# Patient Record
Sex: MALE | Race: WHITE | NOT HISPANIC OR LATINO | Employment: UNEMPLOYED | URBAN - METROPOLITAN AREA
[De-identification: names, ages, dates, MRNs, and addresses within clinical notes are randomized per-mention and may not be internally consistent; named-entity substitution may affect disease eponyms.]

---

## 2019-01-02 ENCOUNTER — TELEPHONE (OUTPATIENT)
Dept: ORTHOPEDIC SURGERY | Facility: CLINIC | Age: 49
End: 2019-01-02

## 2019-01-02 NOTE — TELEPHONE ENCOUNTER
TRIED CALLING, SAID THE NUMBER HAD BEEN CHANGED OR IS NO LONGER IN USE. THERE ISNT ANOTHER NUMBER IN CHART TO CALL.     JUST NEEDS TO BE AWARE OF NEW APPOINTMENT TIME ON 1.17.19

## 2019-01-17 ENCOUNTER — OFFICE VISIT (OUTPATIENT)
Dept: ORTHOPEDIC SURGERY | Facility: CLINIC | Age: 49
End: 2019-01-17

## 2019-01-17 VITALS
BODY MASS INDEX: 31.1 KG/M2 | SYSTOLIC BLOOD PRESSURE: 124 MMHG | WEIGHT: 210 LBS | HEIGHT: 69 IN | HEART RATE: 66 BPM | DIASTOLIC BLOOD PRESSURE: 80 MMHG

## 2019-01-17 DIAGNOSIS — R52 PAIN: Primary | ICD-10-CM

## 2019-01-17 DIAGNOSIS — M17.0 PRIMARY OSTEOARTHRITIS OF BOTH KNEES: ICD-10-CM

## 2019-01-17 PROCEDURE — 73562 X-RAY EXAM OF KNEE 3: CPT | Performed by: ORTHOPAEDIC SURGERY

## 2019-01-17 PROCEDURE — 99203 OFFICE O/P NEW LOW 30 MIN: CPT | Performed by: ORTHOPAEDIC SURGERY

## 2019-01-17 RX ORDER — AMOXICILLIN AND CLAVULANATE POTASSIUM 875; 125 MG/1; MG/1
TABLET, FILM COATED ORAL
COMMUNITY
Start: 2019-01-11 | End: 2019-04-03

## 2019-01-17 RX ORDER — LEVOTHYROXINE SODIUM 0.03 MG/1
25 TABLET ORAL DAILY
COMMUNITY
Start: 2019-01-14

## 2019-01-17 RX ORDER — DEXTROMETHORPHAN HYDROBROMIDE AND PROMETHAZINE HYDROCHLORIDE 15; 6.25 MG/5ML; MG/5ML
SYRUP ORAL
COMMUNITY
Start: 2019-01-11 | End: 2019-04-09

## 2019-01-17 RX ORDER — CLOPIDOGREL BISULFATE 75 MG/1
75 TABLET ORAL DAILY
COMMUNITY
Start: 2019-01-14 | End: 2019-08-01

## 2019-01-17 RX ORDER — PREDNISONE 10 MG/1
TABLET ORAL
COMMUNITY
Start: 2019-01-11 | End: 2019-04-03

## 2019-01-17 RX ORDER — LIDOCAINE AND PRILOCAINE 25; 25 MG/G; MG/G
1 CREAM TOPICAL 3 TIMES DAILY PRN
COMMUNITY
Start: 2018-11-19 | End: 2019-09-17

## 2019-01-17 RX ORDER — ALBUTEROL SULFATE 2.5 MG/3ML
2.5 SOLUTION RESPIRATORY (INHALATION) EVERY 6 HOURS PRN
COMMUNITY
Start: 2019-01-11

## 2019-01-17 RX ORDER — GABAPENTIN 600 MG/1
TABLET ORAL
COMMUNITY
Start: 2018-12-18 | End: 2019-04-03

## 2019-01-17 RX ORDER — BUPRENORPHINE HYDROCHLORIDE AND NALOXONE HYDROCHLORIDE DIHYDRATE 8; 2 MG/1; MG/1
0.5 TABLET SUBLINGUAL 4 TIMES DAILY
COMMUNITY
Start: 2019-01-12

## 2019-01-17 NOTE — PROGRESS NOTES
Subjective:Bilateral knee pain, right worse than left.         Patient ID: Gasper Greene is a 48 y.o. male.    Chief Complaint:    History of Present Illness Patient known to me although has not been seen for over 3 years, returns with bilateral knee pain. He had ACL reconstruction in his left knee over 20-25 years ago and has developed osteoarthritis and pain in both knees, although the right knee, which had MCL reconstruction again over 25 years ago by another physician, is most symptomatic. He has failed to respond to anti-inflammatory agents which he can no longer take as he is now on Plavix. He has also failed cortisone injections and viscosupplement injections and presents for evaluation for possible knee replacement. His current medical history is significant that he is recently under care for chronic wound to the right foot for a prominent 2nd metatarsal head and may need surgery to correct that problem.            Social History     Occupational History   • Not on file   Tobacco Use   • Smoking status: Not on file   Substance and Sexual Activity   • Alcohol use: Not on file   • Drug use: Not on file   • Sexual activity: Not on file      Review of Systems   Constitutional: Negative for chills, diaphoresis, fever and unexpected weight change.   HENT: Negative for hearing loss, nosebleeds, sore throat and tinnitus.    Eyes: Negative for pain and visual disturbance.   Respiratory: Negative for cough, shortness of breath and wheezing.    Cardiovascular: Negative for chest pain and palpitations.   Gastrointestinal: Negative for abdominal pain, diarrhea, nausea and vomiting.   Endocrine: Negative for cold intolerance, heat intolerance and polydipsia.   Genitourinary: Negative for difficulty urinating, dysuria and hematuria.   Musculoskeletal: Positive for arthralgias and myalgias. Negative for joint swelling.   Skin: Negative for rash and wound.   Allergic/Immunologic: Negative for environmental allergies.    Neurological: Negative for dizziness, syncope and numbness.   Hematological: Does not bruise/bleed easily.   Psychiatric/Behavioral: Negative for dysphoric mood and sleep disturbance. The patient is not nervous/anxious.          History reviewed. No pertinent past medical history.  Past Surgical History:   Procedure Laterality Date   • KNEE SURGERY      ACL LEFT   • STOMACH SURGERY       No family history on file.      Objective:  Vitals:    01/17/19 1333   BP: 124/80   Pulse: 66         01/17/19  1333   Weight: 95.3 kg (210 lb)     Body mass index is 31.01 kg/m².        Ortho Exam   AP, lateral and sunrise views of both knees show extensive end-stage tricompartmental degenerative arthritis in both knees, particularly in the left knee which shows significant posterior osteophytes. He is bone-to-bone medially in the right knee also. He is alert and oriented x3. Head is normocephalic and sclerae are clear. The left knee has a moderate varus deformity. He has full extension and flexion to about 95° with pain and crepitus. AP instability on testing. Varus instability on stressing. No motor deficit but does have neuropathy in both feet. The right knee shows mild boggy effusion and 0° to 125° of motion with moderate crepitus and pain but no patellar subluxation. Quad function is 5 over 5. No instability at 0° to 90° and no varus or valgus instability. His calf is nontender with good distal pulses. No motor or sensory deficit. Again, he is unable to take anti-inflammatories because he is now currently on Plavix and recently had stents and has a history of a bleeding ulcer. The pain he describes is 8 or 9 out of 10, quite disabling, particularly in the right knee at this time.         Assessment:        1. Pain    2. Primary osteoarthritis of both knees           Plan:I spent over 20 minutes with the patient face-to-face discussing total joint replacement. He does smoke, and over 3 minutes was spent with the patient  reviewing the need to stop smoking before surgery as I discussed how smoking adversely affects the outcome, increasing the risk of infection. I reviewed other risks of surgery, and I gave him a booklet to review that include but not limited to infection and the need for multiple procedures to include implant removal to eradicate the infection, nerve injury and foot drop, vascular injury, periprosthetic fracture, the need for revision surgery, persistent pain and discomfort despite a well-implanted total knee in 10% to 15% of the people. Discussed rehab which is 3 months to a year. Again discussed in detail cessation of smoking. He needs to have his foot problem corrected as he states he will probably need some surgery to correct the wound and he will call back after he has been released by the wound care center for the foot problem to schedule surgery. He understands this and will call back when he is ready.                Work Status:    ORTIZ query complete.    Orders:  Orders Placed This Encounter   Procedures   • XR knee 3 vw bilateral       Medications:  No orders of the defined types were placed in this encounter.      Followup:  Return if symptoms worsen or fail to improve.          Dictated utilizing Dragon dictation

## 2019-04-03 ENCOUNTER — OFFICE VISIT (OUTPATIENT)
Dept: ORTHOPEDIC SURGERY | Facility: CLINIC | Age: 49
End: 2019-04-03

## 2019-04-03 DIAGNOSIS — M17.0 PRIMARY OSTEOARTHRITIS OF BOTH KNEES: Primary | ICD-10-CM

## 2019-04-03 PROCEDURE — 99213 OFFICE O/P EST LOW 20 MIN: CPT | Performed by: ORTHOPAEDIC SURGERY

## 2019-04-03 RX ORDER — GABAPENTIN 800 MG/1
800 TABLET ORAL 3 TIMES DAILY
COMMUNITY

## 2019-04-03 NOTE — PROGRESS NOTES
Subjective: Osteoarthritis right knee     Patient ID: Gasper Greene is a 48 y.o. male.    Chief Complaint:    History of Present Illness 48-year-old male returns to schedule right total knee arthroplasty to the right knee.  Again he failed to respond to nonoperative management of anti-inflammatory medications cortisone injections Visco supplement injections and modification of activity.  I previously discussed with him the risk and benefits of surgery and he understands wants to proceed with the right knee first       Social History     Occupational History   • Not on file   Tobacco Use   • Smoking status: Not on file   Substance and Sexual Activity   • Alcohol use: Not on file   • Drug use: Not on file   • Sexual activity: Not on file      Review of Systems   Constitutional: Negative for chills, diaphoresis, fever and unexpected weight change.   HENT: Negative for hearing loss, nosebleeds, sore throat and tinnitus.    Eyes: Negative for pain and visual disturbance.   Respiratory: Negative for cough, shortness of breath and wheezing.    Cardiovascular: Negative for chest pain and palpitations.   Gastrointestinal: Negative for abdominal pain, diarrhea, nausea and vomiting.   Endocrine: Negative for cold intolerance, heat intolerance and polydipsia.   Genitourinary: Negative for difficulty urinating, dysuria and hematuria.   Musculoskeletal: Positive for arthralgias.   Skin: Negative for rash and wound.   Allergic/Immunologic: Negative for environmental allergies.   Neurological: Negative for dizziness, syncope and numbness.   Hematological: Does not bruise/bleed easily.   Psychiatric/Behavioral: Negative for dysphoric mood and sleep disturbance. The patient is not nervous/anxious.    All other systems reviewed and are negative.        No past medical history on file.  Past Surgical History:   Procedure Laterality Date   • KNEE SURGERY      ACL LEFT   • STOMACH SURGERY       No family history on  file.      Objective:  There were no vitals filed for this visit.  There were no vitals filed for this visit.  There is no height or weight on file to calculate BMI.        Ortho Exam   Is alert and oriented x3.  The right knee has 0-125 degrees of motion with moderate to severe crepitus but no patella subluxation.  No instability 0 90 degrees no varus or valgus instability at 0 30 degrees.  Quad function is 5/5 the calf is nontender.  Good distal pulses no motor or sensory deficit.  Skin is cool to touch.  There is no erythema.  Joint line tenderness but negative Korina.  Tolerating anti-inflammatories with no improvement in his symptoms.    Assessment:        1. Primary osteoarthritis of both knees           Plan: Over 10 minutes was spent with the patient once again discussing total joint replacement risk and benefits rehab and recovery.  He understands and wishes to proceed ASAP.  Tendon was set up for April 30 pending preop evaluation by his primary care physician and his cardiologist            Work Status:    ORTIZ query complete.    Orders:  No orders of the defined types were placed in this encounter.      Medications:  No orders of the defined types were placed in this encounter.      Followup:  Return in about 4 weeks (around 5/1/2019).          Dictated utilizing Dragon dictation

## 2019-04-04 ENCOUNTER — PREP FOR SURGERY (OUTPATIENT)
Dept: OTHER | Facility: HOSPITAL | Age: 49
End: 2019-04-04

## 2019-04-04 DIAGNOSIS — M17.11 PRIMARY OSTEOARTHRITIS OF RIGHT KNEE: Primary | ICD-10-CM

## 2019-04-04 RX ORDER — PREGABALIN 75 MG/1
150 CAPSULE ORAL ONCE
Status: CANCELLED | OUTPATIENT
Start: 2019-04-30

## 2019-04-04 RX ORDER — ACETAMINOPHEN 500 MG
1000 TABLET ORAL ONCE
Status: CANCELLED | OUTPATIENT
Start: 2019-04-30

## 2019-04-04 RX ORDER — MELOXICAM 7.5 MG/1
15 TABLET ORAL ONCE
Status: CANCELLED | OUTPATIENT
Start: 2019-04-30

## 2019-04-04 RX ORDER — CEFAZOLIN SODIUM 2 G/50ML
2 SOLUTION INTRAVENOUS ONCE
Status: CANCELLED | OUTPATIENT
Start: 2019-04-30

## 2019-04-09 ENCOUNTER — HOSPITAL ENCOUNTER (OUTPATIENT)
Dept: GENERAL RADIOLOGY | Facility: HOSPITAL | Age: 49
Discharge: HOME | End: 2019-04-09
Admitting: ORTHOPAEDIC SURGERY

## 2019-04-09 ENCOUNTER — APPOINTMENT (OUTPATIENT)
Dept: PREADMISSION TESTING | Facility: HOSPITAL | Age: 49
End: 2019-04-09

## 2019-04-09 VITALS
BODY MASS INDEX: 30.85 KG/M2 | SYSTOLIC BLOOD PRESSURE: 96 MMHG | OXYGEN SATURATION: 98 % | RESPIRATION RATE: 16 BRPM | DIASTOLIC BLOOD PRESSURE: 56 MMHG | HEART RATE: 57 BPM | WEIGHT: 215.5 LBS | HEIGHT: 70 IN

## 2019-04-09 DIAGNOSIS — M17.11 PRIMARY OSTEOARTHRITIS OF RIGHT KNEE: ICD-10-CM

## 2019-04-09 LAB
ANION GAP SERPL CALCULATED.3IONS-SCNC: 5.8 MMOL/L
BASOPHILS # BLD AUTO: 0.04 10*3/MM3 (ref 0–0.2)
BASOPHILS NFR BLD AUTO: 0.7 % (ref 0–1.5)
BILIRUB UR QL STRIP: NEGATIVE
BUN BLD-MCNC: 16 MG/DL (ref 6–20)
BUN/CREAT SERPL: 20.3 (ref 7–25)
CALCIUM SPEC-SCNC: 9.2 MG/DL (ref 8.6–10.5)
CHLORIDE SERPL-SCNC: 102 MMOL/L (ref 98–107)
CLARITY UR: CLEAR
CO2 SERPL-SCNC: 32.2 MMOL/L (ref 22–29)
COLOR UR: ABNORMAL
CREAT BLD-MCNC: 0.79 MG/DL (ref 0.76–1.27)
DEPRECATED RDW RBC AUTO: 50.3 FL (ref 37–54)
EOSINOPHIL # BLD AUTO: 0.39 10*3/MM3 (ref 0–0.4)
EOSINOPHIL NFR BLD AUTO: 7.2 % (ref 0.3–6.2)
ERYTHROCYTE [DISTWIDTH] IN BLOOD BY AUTOMATED COUNT: 13.3 % (ref 12.3–15.4)
GFR SERPL CREATININE-BSD FRML MDRD: 104 ML/MIN/1.73
GLUCOSE BLD-MCNC: 96 MG/DL (ref 65–99)
GLUCOSE UR STRIP-MCNC: NEGATIVE MG/DL
HCT VFR BLD AUTO: 40.6 % (ref 37.5–51)
HGB BLD-MCNC: 13.3 G/DL (ref 13–17.7)
HGB UR QL STRIP.AUTO: NEGATIVE
IMM GRANULOCYTES # BLD AUTO: 0.02 10*3/MM3 (ref 0–0.05)
IMM GRANULOCYTES NFR BLD AUTO: 0.4 % (ref 0–0.5)
KETONES UR QL STRIP: ABNORMAL
LEUKOCYTE ESTERASE UR QL STRIP.AUTO: NEGATIVE
LYMPHOCYTES # BLD AUTO: 1.77 10*3/MM3 (ref 0.7–3.1)
LYMPHOCYTES NFR BLD AUTO: 32.8 % (ref 19.6–45.3)
MCH RBC QN AUTO: 33.4 PG (ref 26.6–33)
MCHC RBC AUTO-ENTMCNC: 32.8 G/DL (ref 31.5–35.7)
MCV RBC AUTO: 102 FL (ref 79–97)
MONOCYTES # BLD AUTO: 0.49 10*3/MM3 (ref 0.1–0.9)
MONOCYTES NFR BLD AUTO: 9.1 % (ref 5–12)
NEUTROPHILS # BLD AUTO: 2.69 10*3/MM3 (ref 1.4–7)
NEUTROPHILS NFR BLD AUTO: 49.8 % (ref 42.7–76)
NITRITE UR QL STRIP: NEGATIVE
NRBC BLD AUTO-RTO: 0 /100 WBC (ref 0–0)
PH UR STRIP.AUTO: 6 [PH] (ref 4.5–8)
PLATELET # BLD AUTO: 192 10*3/MM3 (ref 140–450)
PMV BLD AUTO: 9.6 FL (ref 6–12)
POTASSIUM BLD-SCNC: 4.7 MMOL/L (ref 3.5–5.2)
PROT UR QL STRIP: NEGATIVE
RBC # BLD AUTO: 3.98 10*6/MM3 (ref 4.14–5.8)
SODIUM BLD-SCNC: 140 MMOL/L (ref 136–145)
SP GR UR STRIP: 1.02 (ref 1–1.03)
UROBILINOGEN UR QL STRIP: ABNORMAL
WBC NRBC COR # BLD: 5.4 10*3/MM3 (ref 3.4–10.8)

## 2019-04-09 PROCEDURE — 85025 COMPLETE CBC W/AUTO DIFF WBC: CPT | Performed by: ORTHOPAEDIC SURGERY

## 2019-04-09 PROCEDURE — 81003 URINALYSIS AUTO W/O SCOPE: CPT | Performed by: ORTHOPAEDIC SURGERY

## 2019-04-09 PROCEDURE — 80048 BASIC METABOLIC PNL TOTAL CA: CPT | Performed by: ORTHOPAEDIC SURGERY

## 2019-04-09 PROCEDURE — 36415 COLL VENOUS BLD VENIPUNCTURE: CPT

## 2019-04-09 PROCEDURE — 87081 CULTURE SCREEN ONLY: CPT | Performed by: ORTHOPAEDIC SURGERY

## 2019-04-09 PROCEDURE — 71046 X-RAY EXAM CHEST 2 VIEWS: CPT

## 2019-04-09 RX ORDER — MULTIPLE VITAMINS W/ MINERALS TAB 9MG-400MCG
1 TAB ORAL DAILY
COMMUNITY

## 2019-04-09 RX ORDER — BUSPIRONE HYDROCHLORIDE 10 MG/1
10 TABLET ORAL 3 TIMES DAILY PRN
COMMUNITY

## 2019-04-09 RX ORDER — PYRIDOXINE HCL (VITAMIN B6) 50 MG
1000 TABLET ORAL DAILY
COMMUNITY
End: 2020-01-06

## 2019-04-09 RX ORDER — DULOXETIN HYDROCHLORIDE 60 MG/1
60 CAPSULE, DELAYED RELEASE ORAL DAILY
COMMUNITY
End: 2019-08-01

## 2019-04-09 RX ORDER — ASPIRIN 81 MG/1
81 TABLET ORAL DAILY
COMMUNITY
End: 2019-05-02 | Stop reason: HOSPADM

## 2019-04-09 NOTE — DISCHARGE INSTRUCTIONS
PRE-ADMISSION TESTING INSTRUCTIONS FOR TOTAL JOINT PATIENTS    Take these medications the morning of surgery with a small sip of water:  Suboxone, duloxetine, levothyroxine, and gabapentin, use your nebulizer      No aspirin, advil, aleve, ibuprofen, naproxen, diet pills, decongestants, or vitamin/herbal supplements for a week prior to your surgery.  Dr Curry's office will let you know about stopping your Plavix for surgery.    Do not take any insulin or diabetes medications the morning of surgery.      Start your Bactroban ointment on ____4/25/2019_______.  You will need to apply the ointment with a clean Q-tip 3 times a day in both sides of your nose for 5 days and the morning of surgery.    General Instructions:    • Do not eat solid food after midnight the night before surgery.  No gum, mints, or hard candy after midnight the night before surgery.  • You may drink clear liquids the day of surgery up until 2 hours before your arrival time.  • Clear liquids are liquids you can see through. Nothing RED in color.    Plain water    Sports drinks  Sodas     Gelatin (Jell-O)  Fruit juices without pulp such as white grape juice and apple juice  Popsicles that contain no fruit or yogurt  Tea or coffee (no cream or milk added)    • It is beneficial for you to have a clear drink that contains carbohydrates just before you leave your house and before your fasting time begins.  We suggest a 20 ounce bottle of Gatorade or Powerade for non-diabetic patients or a 20 ounce bottle of G2 or Powerade Zero for diabetic patients.     • Patients who avoid smoking, chewing tobacco and alcohol for 4 weeks prior to surgery have a reduced risk of post-operative complications.  If at all possible, quit smoking as many days before surgery as you can.    • Do not smoke, use chewing tobacco or drink alcohol after midnight the day of surgery.    • Bring your C-PAP/ BI-PAP machine if you use one.  • Wear clean comfortable clothes and  socks.  • Do not wear contact lenses, lotion, deodorant, or make-up.  Bring a case for your glasses if applicable.   • You may brush your teeth the morning of surgery.  • You may wear dentures/partials, do not put adhesive/glue on them.  • Bring crutches or walker if applicable.  • Leave all other jewelry and valuables at home.  • NOTIFY YOUR SURGEON IF YOU BECOME ILL, HAVE A FEVER, PRODUCTIVE COUGH, OR CANNOT BE HERE THE DAY OF SURGERY.      Preventing a Surgical Site Infection:    • Shower the night before and on the morning of surgery using the chlorhexidine soap you were given.  Use a clean washcloth with the soap.  Place clean sheets on your bed after showering the night before surgery. Do not use the CHG soap on your hair, face, or private areas. Wash your body gently for five (5) minutes. Do not scrub your skin.  Dry with a clean towel and dress in clean clothing.    • Do not shave the surgical area for 10 days-2 weeks prior to surgery  because the razor can irritate skin and make it easier to develop an infection.  • Make sure you, your family, and all healthcare providers clean their hands with soap and water or an alcohol based hand  before caring for you or your wound.      Day of surgery:    Your surgeon’s office will advise you of your arrival time for the day of surgery.    Upon arrival, a Pre-op nurse and Anesthesia provider will review your health history, obtain vital signs, and answer questions you may have.  The only belongings needed at this time will be your home medications and if applicable your C-PAP/BI-PAP machine.  If you are staying overnight your family can leave the rest of your belongings in the car and bring them to your room later.  A Pre-op nurse will start an IV and you may receive medication in preparation for surgery, including something to help you relax.  Your family will be able to see you in the Pre-op area.  While you are in surgery your family should notify the  waiting room  if they leave the waiting room area and provide a contact phone number.    If you have any questions, you can call the Pre-Admission Department at [phone] or your surgeon's office.    Please be aware that surgery does come with discomfort.  We want to make every effort to control your discomfort so please discuss any uncontrolled symptoms with your nurse.   Your doctor will most likely have prescribed pain medications.     You may have bruising or discomfort from the tourniquet used in surgery.     Please leave all luggage in the car the morning of surgery.  You will be transported to your hospital room following the recovery period.  Your family can get your luggage at that time.      You may receive a survey regarding the care you received. Your feedback is very important and will be used to collect the necessary data to help us to continue to provide excellent care.

## 2019-04-11 LAB — MRSA SPEC QL CULT: NORMAL

## 2019-04-17 DIAGNOSIS — M17.11 PRIMARY OSTEOARTHRITIS OF RIGHT KNEE: ICD-10-CM

## 2019-04-18 ENCOUNTER — OFFICE VISIT (OUTPATIENT)
Dept: SLEEP MEDICINE | Facility: HOSPITAL | Age: 49
End: 2019-04-18

## 2019-04-18 VITALS
SYSTOLIC BLOOD PRESSURE: 111 MMHG | DIASTOLIC BLOOD PRESSURE: 74 MMHG | BODY MASS INDEX: 30.92 KG/M2 | HEART RATE: 58 BPM | WEIGHT: 216 LBS | HEIGHT: 70 IN

## 2019-04-18 DIAGNOSIS — G47.8 NON-RESTORATIVE SLEEP: ICD-10-CM

## 2019-04-18 DIAGNOSIS — R25.8 NOCTURNAL LEG MOVEMENTS: ICD-10-CM

## 2019-04-18 DIAGNOSIS — E66.9 OBESITY (BMI 30-39.9): ICD-10-CM

## 2019-04-18 DIAGNOSIS — G47.30 SLEEP APNEA, UNSPECIFIED TYPE: Primary | ICD-10-CM

## 2019-04-18 DIAGNOSIS — G47.10 HYPERSOMNIA: ICD-10-CM

## 2019-04-18 PROCEDURE — 99213 OFFICE O/P EST LOW 20 MIN: CPT | Performed by: FAMILY MEDICINE

## 2019-04-18 PROCEDURE — G0463 HOSPITAL OUTPT CLINIC VISIT: HCPCS

## 2019-04-18 NOTE — PROGRESS NOTES
[facility] New Patient/Consultation       Reason for Consultation: History of obstructive sleep apnea on CPAP in the past; oxygen desaturations noted while under anesthesia      Patient Care Team:  Nila Anne APRN as PCP - General (Family Medicine)  Chace Bradshaw MD as Consulting Physician (Sleep Medicine)      History of present illness:  Thank you for asking me to see your patient.  The patient is a 48 y.o. male today with concern for obstructive sleep apnea.  Notes that he is been told that he holds his breath while he sleeps.  Also has been told he stops breathing during sleep.  He had a sleep study when he lived in [place].  He had a BIPAP in the past however is not currently using it for several years now. Has been on suboxone for about 6 months.     Sleep Schedule:  Bed time: Varies  Sleep latency: Varies  Wake time: Wakes up about every 2 hours when he sleeping  Average hours slept: 6  Non-restorative sleep: Yes  Number of naps per day: 1  Rotating shifts?:no  Nocturia: yes and 3 times  Electronics in bedroom: no    Excessive daytime sleepiness or drowsiness: yes  Any accidents at work due to sleepiness in the last 5 years:no  Any difficulty driving due to sleepiness or being drowsy: no  Weight changed in the last 5 years:yes and Lost 30 pounds after he had a heart attack however then gained back    Snoring:yes  Witnessed apneas:yes  Have you ever awakened gasping for breath, coughing, choking or respiratory discomfort: yes  Morning headaches: no  Awaken with a sore throat or dry mouth: no    Any reports of leg jerking at night: yes  Urge sensations: yes  Does pain disrupt sleep: yes  Sweating during sleep:no  Teeth grinding: no    Any sudden episodes of sleep during the day: no  Sleep paralysis/hallucinations: no  Muscle weakness with laughing/anger: no  Nightmares: no  Sleep walking: no    Are you sleepy when you increase your sleep time: no  Do you sleep better away from your own bed:  no    ESS: 4    Social History: Smokes 1 pack/day since age 13; no alcohol use; 3 sodas a day     Review of Systems negative except for: frequent urination; nasal congestion; joint pain; regular heartbeat; shortness of breath; dizziness; anxiety; depression; see page for sleep questionnaire for further details    Allergies:  Nsaids and Celebrex [celecoxib]    Family History: SOFY no       Current Outpatient Medications:   •  albuterol (PROVENTIL) (2.5 MG/3ML) 0.083% nebulizer solution, Take  by nebulization Every 6 (Six) Hours As Needed for Wheezing or Shortness of Air., Disp: , Rfl:   •  aspirin 81 MG EC tablet, Take 81 mg by mouth Daily., Disp: , Rfl:   •  buprenorphine-naloxone (SUBOXONE) 8-2 MG per SL tablet, Place 0.5 tablets under the tongue 2 (Two) Times a Day. Takes half in morning and half in afternoon, Disp: , Rfl:   •  busPIRone (BUSPAR) 10 MG tablet, Take 10 mg by mouth 3 (Three) Times a Day As Needed (anxiety)., Disp: , Rfl:   •  Cholecalciferol (VITAMIN D PO), Take 1 tablet by mouth Daily., Disp: , Rfl:   •  clopidogrel (PLAVIX) 75 MG tablet, Take 75 mg by mouth Daily., Disp: , Rfl:   •  Coenzyme Q10 (COQ10 PO), Take 1 tablet by mouth Daily., Disp: , Rfl:   •  cyanocobalamin (CYANOCOBALAMIN) 100 MCG tablet tablet, Take 1,000 mcg by mouth Daily., Disp: , Rfl:   •  DULoxetine (CYMBALTA) 60 MG capsule, Take 60 mg by mouth Daily., Disp: , Rfl:   •  gabapentin (NEURONTIN) 800 MG tablet, Take 800 mg by mouth 3 (Three) Times a Day. One tablet twice a day and 2 tablets at bedtime, Disp: , Rfl:   •  levothyroxine (SYNTHROID, LEVOTHROID) 25 MCG tablet, Take 25 mcg by mouth Daily., Disp: , Rfl:   •  lidocaine-prilocaine (EMLA) 2.5-2.5 % cream, Apply 1 application topically to the appropriate area as directed 3 (Three) Times a Day As Needed (neuropathy)., Disp: , Rfl:   •  Multiple Vitamins-Minerals (MULTIVITAMIN WITH MINERALS) tablet tablet, Take 1 tablet by mouth Daily., Disp: , Rfl:   •  mupirocin (BACTROBAN) 2  "% ointment, 1 application into the nostril(s) as directed by provider 3 (Three) Times a Day. 5 days prior to surgery., Disp: 22 g, Rfl: 0    Vital Signs:    Vitals:    04/18/19 0900   BP: 111/74   Pulse: 58   Weight: 98 kg (216 lb)   Height: 176.5 cm (69.5\")      Body mass index is 31.44 kg/m².  Neck Circumference: 18 inches      Physical Exam:   General Alert and oriented. No acute distress noted   Pharynx/Throat Class III Mallampati airway, large tongue, no evidence of redundant lateral pharyngeal tissue. No oral lesions. No thrush. Moist mucous membranes.   Head Normocephalic. Symmetrical. Atraumatic.    Nose No septal deviation. No drainage   Chest Wall Normal shape. Symmetric expansion with respiration. No tenderness.   Neck Trachea midline, no thyromegaly or adenopathy    Lungs Clear to auscultation bilaterally. No wheezes. No rhonchi. No rales. Respirations regular, even and unlabored.   Heart Regular rhythm and normal rate. Normal S1 and S2. No murmur   Abdomen Soft, non-tender and non-distended. Normal bowel sounds. No masses.   Extremities Moves all extremities well. No edema   Psychiatric Normal mood and affect.       Impression:  1. Sleep apnea, unspecified type    2. Hypersomnia    3. Non-restorative sleep    4. Obesity (BMI 30-39.9)    5. Nocturnal leg movements        Plan:    Good sleep hygiene measures should be maintained.  Weight loss would be beneficial in this patient who is obese with BMI 31.5.    I discussed the pathophysiology of obstructive sleep apnea with the patient.  We discussed the adverse outcomes associated with untreated sleep-disordered breathing.  We discussed treatment modalities of obstructive sleep apnea including CPAP device as well as oral mandibular advancement device. Sleep study will be scheduled to establish definitive diagnosis of sleep disorder breathing.  Weight loss will be strongly beneficial in order to reduce the severity of sleep-disordered breathing.  Patient has " narrow oropharyngeal structure.  Caution during activities that require prolonged concentration is strongly advised.  Patient will be notified of sleep study results after sleep study is completed.  If sleep apnea is only mild,  oral mandibular advancement device may be one of the treatment options.  However if sleep apnea is moderately severe, CPAP treatment will be strongly encouraged.  The patient is not opposed to treatment with CPAP device if we confirm significant obstructive sleep apnea on polysomnography.     No Ambien Rx for night of sleep study. Of note has been on Suboxone for about 6 months which has been working well for him.  Because he is on Suboxone we are concerned about possible central sleep apnea secondary to Suboxone use now also being a part of the picture of his history of obstructive sleep apnea.  In lab study would be needed to fully and appropriately diagnose central sleep apnea aspect of his sleep apnea.    Thank you for allowing me to participate in your patient's care.    Chace Bradshaw MD  Sleep Medicine  04/18/19  11:27 AM

## 2019-04-22 ENCOUNTER — HOSPITAL ENCOUNTER (OUTPATIENT)
Dept: SLEEP MEDICINE | Facility: HOSPITAL | Age: 49
Discharge: HOME | End: 2019-04-22
Admitting: FAMILY MEDICINE

## 2019-04-22 DIAGNOSIS — E66.9 OBESITY (BMI 30-39.9): ICD-10-CM

## 2019-04-22 DIAGNOSIS — G47.8 NON-RESTORATIVE SLEEP: ICD-10-CM

## 2019-04-22 DIAGNOSIS — G47.30 SLEEP APNEA, UNSPECIFIED TYPE: ICD-10-CM

## 2019-04-22 DIAGNOSIS — R25.8 NOCTURNAL LEG MOVEMENTS: ICD-10-CM

## 2019-04-22 DIAGNOSIS — G47.10 HYPERSOMNIA: ICD-10-CM

## 2019-04-22 PROCEDURE — 95810 POLYSOM 6/> YRS 4/> PARAM: CPT

## 2019-04-29 ENCOUNTER — ANESTHESIA EVENT (OUTPATIENT)
Dept: PERIOP | Facility: HOSPITAL | Age: 49
End: 2019-04-29

## 2019-04-29 ENCOUNTER — OFFICE VISIT (OUTPATIENT)
Dept: ORTHOPEDIC SURGERY | Facility: CLINIC | Age: 49
End: 2019-04-29

## 2019-04-29 VITALS — WEIGHT: 216 LBS | BODY MASS INDEX: 30.92 KG/M2 | HEIGHT: 70 IN

## 2019-04-29 DIAGNOSIS — M17.11 PRIMARY OSTEOARTHRITIS OF RIGHT KNEE: Primary | ICD-10-CM

## 2019-04-29 PROCEDURE — S0260 H&P FOR SURGERY: HCPCS | Performed by: ORTHOPAEDIC SURGERY

## 2019-04-29 ASSESSMENT — KOOS JR
KOOS JR SCORE: 34.174
KOOS JR SCORE: 21

## 2019-04-29 NOTE — ANESTHESIA PREPROCEDURE EVALUATION
Anesthesia Evaluation     Patient summary reviewed and Nursing notes reviewed   no history of anesthetic complications:  NPO Solid Status: > 8 hours  NPO Liquid Status: > 8 hours           Airway   Mallampati: II  TM distance: >3 FB  Neck ROM: full  No difficulty expected  Dental      Pulmonary     breath sounds clear to auscultation  (+) a smoker (1ppd) Current Smoked day of surgery, sleep apnea (does not use machine ),     ROS comment: XR Chest 2 View ([id number]) (Order [id number])   Order   Status: Final result  Patient Location     Patient Class Location  [facility]      [phone]    Study Notes        Christos, Sarah Haroldo on 4/9/2019 11:25 AM  Pre op for knee surgery 4-3-19  Smoker x 35 years  Heart stents 2017  soa with activity    Appointment Information     PACS Images      Radiology Images  Study Result     CHEST X-RAY, 04/09/2019         HISTORY:  48-year-old male undergoing preoperative testing prior to scheduled knee  surgery. 35 year smoking history. Coronary artery disease with stents.     TECHNIQUE:  PA and lateral upright chest x-ray.     FINDINGS:  Heart size and pulmonary vascularity are normal. The lungs are expanded  and clear. No visible pulmonary infiltrate or pleural effusion.     IMPRESSION:  No active disease.     This report was finalized on 4/9/2019 2:31 PM by Dr. Gabe Mcdaniels MD.      Cardiovascular   Exercise tolerance: good (4-7 METS)    ECG reviewed  PT is on anticoagulation therapy  Rhythm: regular  Rate: normal    (+) hypertension (pt off meds due to side effects ), past MI (pt states he's had 4-5 MIs ) , CAD, cardiac stents (2017 x 2) more than 12 months ago dysrhythmias Bradycardia, angina (pt states related to anxiety ), hyperlipidemia,     ROS comment: Piero Napier MD - 01/24/2019 1:35 PM EST    Ok for surgery with acceptable risks. May stop plavix 5 days before surgery. Follow up 3 months.     Electronically signed by    Neuro/Psych  (+)  dizziness/light headedness, tremors (RLS ), numbness (sai feet neuropathy ), psychiatric history (panic attacks ) Anxiety,     GI/Hepatic/Renal/Endo    (+)  PUD (surgery for bleeding ulcers ),  hepatitis C, hypothyroidism,     Musculoskeletal     (+) back pain,   Abdominal    Substance History   (+) alcohol use (Hx ETOH abuse quit 2000), drug use (hx of meth and pain pill abuse.  quit in 2017   )     OB/GYN          Other   (+) arthritis     ROS/Med Hx Other:  Piero Napier MD at 01/24/2019 1:51 PM EST    Cardiac clearance and PCP clearance reviewed       buprenorphine-naloxone (SUBOXONE) 8-2 MG per SL  Tablet since sept 2018 4/30/2019 at 0900 04/30/19 1013                    Anesthesia Plan    ASA 3     regional, spinal and MAC     intravenous induction   Anesthetic plan, all risks, benefits, and alternatives have been provided, discussed and informed consent has been obtained with: patient.  Use of blood products discussed with patient  Consented to blood products.

## 2019-04-29 NOTE — PROGRESS NOTES
Subjective: Osteoarthritis right knee     Patient ID: Gasper Greene is a 48 y.o. male.    Chief Complaint:    History of Present Illness patient seen for educating her right total knee arthroplasty tomorrow       Social History     Occupational History   • Not on file   Tobacco Use   • Smoking status: Current Every Day Smoker     Packs/day: 1.00     Years: 35.00     Pack years: 35.00     Types: Cigarettes   • Smokeless tobacco: Former User   Substance and Sexual Activity   • Alcohol use: No     Frequency: Never     Comment: h/o stopped in 2000   • Drug use: Yes     Types: Methamphetamines     Comment: history of, last 2/2019   • Sexual activity: Defer      Review of Systems   Constitutional: Negative for chills, diaphoresis, fever and unexpected weight change.   HENT: Negative for hearing loss, nosebleeds, sore throat and tinnitus.    Eyes: Negative for pain and visual disturbance.   Respiratory: Negative for cough, shortness of breath and wheezing.    Cardiovascular: Negative for chest pain and palpitations.   Gastrointestinal: Negative for abdominal pain, diarrhea, nausea and vomiting.   Endocrine: Negative for cold intolerance, heat intolerance and polydipsia.   Genitourinary: Negative for difficulty urinating, dysuria and hematuria.   Musculoskeletal: Positive for arthralgias and myalgias. Negative for joint swelling.   Skin: Negative for rash and wound.   Allergic/Immunologic: Negative for environmental allergies.   Neurological: Negative for dizziness, syncope and numbness.   Hematological: Does not bruise/bleed easily.   Psychiatric/Behavioral: Negative for dysphoric mood and sleep disturbance. The patient is not nervous/anxious.          Past Medical History:   Diagnosis Date   • Arthritis    • Chest pain     states has been told d/t anxiety   • Coronary artery disease 09/2017    s/p stents    • Frequent urination at night    • History of bleeding ulcers 2012   • History of GI bleed 2012   • History of MI  (myocardial infarction) 09/2017    posterior, Dr Napier follows   • History of palpitations     states has pain w/, cardiologist aware   • History of transfusion    • Hyperlipidemia    • Hypothyroidism    • Ischemic cardiomyopathy    • Left knee DJD    • Neuropathy of both feet    • NSVT (nonsustained ventricular tachycardia) (CMS/HCC)     h/o of after MI 2017   • Osteomyelitis of right foot (CMS/HCC)     h/o   • Right knee DJD     sched TKA   • Sleep apnea     no machine     Past Surgical History:   Procedure Laterality Date   • CARDIAC CATHETERIZATION  09/2017   • CORONARY ANGIOPLASTY WITH STENT PLACEMENT  09/2017   • KNEE ACL RECONSTRUCTION Left 1992   • KNEE SURGERY Right     tendon rupture and repair/replace   • METATARSAL OSTEOTOMY Right 02/2019 2nd   • STOMACH SURGERY      d/t bleeding ulcer     Family History   Problem Relation Age of Onset   • Lung cancer Mother    • Alcohol abuse Father    • Seizures Father    • Diabetes Maternal Aunt    • Diabetes Maternal Uncle    • Alcohol abuse Paternal Aunt    • Alcohol abuse Paternal Uncle    • Heart disease Maternal Grandmother    • Alcohol abuse Paternal Grandmother    • Cirrhosis Paternal Grandmother    • Hypertension Paternal Grandfather    • Malig Hyperthermia Neg Hx          Objective:  There were no vitals filed for this visit.      04/29/19  1520   Weight: 98 kg (216 lb)     Body mass index is 31.44 kg/m².        Ortho Exam    H&P completed  Assessment:        1. Primary osteoarthritis of right knee           Plan: All questions answered.  Patient is a drug addict and rehab taking Suboxone and understands and agrees to the fact that we will do only Tylenol postoperatively            Work Status:    ORTIZ query complete.    Orders:  No orders of the defined types were placed in this encounter.      Medications:  No orders of the defined types were placed in this encounter.      Followup:  Return if symptoms worsen or fail to improve.          Dictated  utilizing Dragon dictation

## 2019-04-30 ENCOUNTER — APPOINTMENT (OUTPATIENT)
Dept: GENERAL RADIOLOGY | Facility: HOSPITAL | Age: 49
End: 2019-04-30

## 2019-04-30 ENCOUNTER — HOSPITAL ENCOUNTER (OUTPATIENT)
Facility: HOSPITAL | Age: 49
Setting detail: OBSERVATION
Discharge: HOME | End: 2019-05-02
Attending: ORTHOPAEDIC SURGERY | Admitting: ORTHOPAEDIC SURGERY

## 2019-04-30 ENCOUNTER — ANESTHESIA (OUTPATIENT)
Dept: PERIOP | Facility: HOSPITAL | Age: 49
End: 2019-04-30

## 2019-04-30 DIAGNOSIS — Z96.651 STATUS POST TOTAL RIGHT KNEE REPLACEMENT: Primary | ICD-10-CM

## 2019-04-30 DIAGNOSIS — M17.11 PRIMARY OSTEOARTHRITIS OF RIGHT KNEE: ICD-10-CM

## 2019-04-30 LAB
ABO GROUP BLD: NORMAL
ANTI-K: NORMAL
BLD GP AB SCN SERPL QL: POSITIVE
RH BLD: NEGATIVE
T&S EXPIRATION DATE: NORMAL

## 2019-04-30 PROCEDURE — 27447 TOTAL KNEE ARTHROPLASTY: CPT | Performed by: ORTHOPAEDIC SURGERY

## 2019-04-30 PROCEDURE — 25010000002 VANCOMYCIN 1 G RECONSTITUTED SOLUTION 1 EACH VIAL: Performed by: ORTHOPAEDIC SURGERY

## 2019-04-30 PROCEDURE — 86900 BLOOD TYPING SEROLOGIC ABO: CPT

## 2019-04-30 PROCEDURE — 25010000003 CEFAZOLIN SODIUM-DEXTROSE 2-3 GM-%(50ML) RECONSTITUTED SOLUTION: Performed by: ORTHOPAEDIC SURGERY

## 2019-04-30 PROCEDURE — G0378 HOSPITAL OBSERVATION PER HR: HCPCS

## 2019-04-30 PROCEDURE — 94770: CPT

## 2019-04-30 PROCEDURE — 94799 UNLISTED PULMONARY SVC/PX: CPT

## 2019-04-30 PROCEDURE — 86850 RBC ANTIBODY SCREEN: CPT | Performed by: ORTHOPAEDIC SURGERY

## 2019-04-30 PROCEDURE — 86922 COMPATIBILITY TEST ANTIGLOB: CPT

## 2019-04-30 PROCEDURE — 25010000002 PROPOFOL 10 MG/ML EMULSION: Performed by: NURSE ANESTHETIST, CERTIFIED REGISTERED

## 2019-04-30 PROCEDURE — 86900 BLOOD TYPING SEROLOGIC ABO: CPT | Performed by: ORTHOPAEDIC SURGERY

## 2019-04-30 PROCEDURE — 86901 BLOOD TYPING SEROLOGIC RH(D): CPT

## 2019-04-30 PROCEDURE — C1776 JOINT DEVICE (IMPLANTABLE): HCPCS | Performed by: ORTHOPAEDIC SURGERY

## 2019-04-30 PROCEDURE — 86902 BLOOD TYPE ANTIGEN DONOR EA: CPT

## 2019-04-30 PROCEDURE — 86905 BLOOD TYPING RBC ANTIGENS: CPT

## 2019-04-30 PROCEDURE — 86870 RBC ANTIBODY IDENTIFICATION: CPT | Performed by: ORTHOPAEDIC SURGERY

## 2019-04-30 PROCEDURE — C9290 INJ, BUPIVACAINE LIPOSOME: HCPCS | Performed by: ORTHOPAEDIC SURGERY

## 2019-04-30 PROCEDURE — 86901 BLOOD TYPING SEROLOGIC RH(D): CPT | Performed by: ORTHOPAEDIC SURGERY

## 2019-04-30 PROCEDURE — 25010000002 ROPIVACAINE PER 1 MG: Performed by: NURSE ANESTHETIST, CERTIFIED REGISTERED

## 2019-04-30 PROCEDURE — 25010000003 BUPIVACAINE LIPOSOME 1.3 % SUSPENSION 20 ML VIAL: Performed by: ORTHOPAEDIC SURGERY

## 2019-04-30 PROCEDURE — C1713 ANCHOR/SCREW BN/BN,TIS/BN: HCPCS | Performed by: ORTHOPAEDIC SURGERY

## 2019-04-30 PROCEDURE — 73560 X-RAY EXAM OF KNEE 1 OR 2: CPT

## 2019-04-30 PROCEDURE — 20985 CPTR-ASST DIR MS PX: CPT | Performed by: ORTHOPAEDIC SURGERY

## 2019-04-30 PROCEDURE — 25010000002 DEXAMETHASONE PER 1 MG: Performed by: NURSE ANESTHETIST, CERTIFIED REGISTERED

## 2019-04-30 PROCEDURE — 86920 COMPATIBILITY TEST SPIN: CPT

## 2019-04-30 PROCEDURE — 99213 OFFICE O/P EST LOW 20 MIN: CPT | Performed by: HOSPITALIST

## 2019-04-30 PROCEDURE — 25010000002 ONDANSETRON PER 1 MG: Performed by: NURSE ANESTHETIST, CERTIFIED REGISTERED

## 2019-04-30 PROCEDURE — 25010000002 VANCOMYCIN PER 500 MG: Performed by: ORTHOPAEDIC SURGERY

## 2019-04-30 DEVICE — COMP FEM TRIATH CR CMTLS SZ5 RT: Type: IMPLANTABLE DEVICE | Site: KNEE | Status: FUNCTIONAL

## 2019-04-30 DEVICE — BASEPLT TIB TRIATH TRITANIUM SZ5: Type: IMPLANTABLE DEVICE | Site: KNEE | Status: FUNCTIONAL

## 2019-04-30 DEVICE — CAP TOTAL KN CMTLS 4 PC: Type: IMPLANTABLE DEVICE | Site: KNEE | Status: FUNCTIONAL

## 2019-04-30 DEVICE — INSRT TIB/KN TRIATHLON CONDY/STBL X3 SZ5 9MM: Type: IMPLANTABLE DEVICE | Site: KNEE | Status: FUNCTIONAL

## 2019-04-30 RX ORDER — CEFAZOLIN SODIUM 2 G/50ML
2 SOLUTION INTRAVENOUS EVERY 8 HOURS
Status: COMPLETED | OUTPATIENT
Start: 2019-04-30 | End: 2019-05-01

## 2019-04-30 RX ORDER — DEXAMETHASONE SODIUM PHOSPHATE 4 MG/ML
8 INJECTION, SOLUTION INTRA-ARTICULAR; INTRALESIONAL; INTRAMUSCULAR; INTRAVENOUS; SOFT TISSUE ONCE
Status: COMPLETED | OUTPATIENT
Start: 2019-04-30 | End: 2019-04-30

## 2019-04-30 RX ORDER — ONDANSETRON 4 MG/1
4 TABLET, FILM COATED ORAL EVERY 6 HOURS PRN
Status: DISCONTINUED | OUTPATIENT
Start: 2019-04-30 | End: 2019-05-02 | Stop reason: HOSPADM

## 2019-04-30 RX ORDER — BUSPIRONE HYDROCHLORIDE 5 MG/1
10 TABLET ORAL 3 TIMES DAILY PRN
Status: DISCONTINUED | OUTPATIENT
Start: 2019-04-30 | End: 2019-05-02 | Stop reason: HOSPADM

## 2019-04-30 RX ORDER — BUPRENORPHINE 2 MG/1
4 TABLET SUBLINGUAL ONCE
Status: COMPLETED | OUTPATIENT
Start: 2019-04-30 | End: 2019-04-30

## 2019-04-30 RX ORDER — ACETAMINOPHEN 500 MG
1000 TABLET ORAL EVERY 6 HOURS
Status: DISCONTINUED | OUTPATIENT
Start: 2019-04-30 | End: 2019-05-02 | Stop reason: HOSPADM

## 2019-04-30 RX ORDER — BACITRACIN ZINC 500 [USP'U]/G
OINTMENT TOPICAL AS NEEDED
Status: DISCONTINUED | OUTPATIENT
Start: 2019-04-30 | End: 2019-04-30 | Stop reason: HOSPADM

## 2019-04-30 RX ORDER — LEVOTHYROXINE SODIUM 0.03 MG/1
25 TABLET ORAL DAILY
Status: DISCONTINUED | OUTPATIENT
Start: 2019-04-30 | End: 2019-05-02 | Stop reason: HOSPADM

## 2019-04-30 RX ORDER — ACETAMINOPHEN 325 MG/1
650 TABLET ORAL ONCE AS NEEDED
Status: DISCONTINUED | OUTPATIENT
Start: 2019-04-30 | End: 2019-04-30 | Stop reason: HOSPADM

## 2019-04-30 RX ORDER — LIDOCAINE HYDROCHLORIDE 10 MG/ML
0.5 INJECTION, SOLUTION EPIDURAL; INFILTRATION; INTRACAUDAL; PERINEURAL ONCE AS NEEDED
Status: COMPLETED | OUTPATIENT
Start: 2019-04-30 | End: 2019-04-30

## 2019-04-30 RX ORDER — CEFAZOLIN SODIUM 2 G/50ML
2 SOLUTION INTRAVENOUS EVERY 8 HOURS
Status: DISCONTINUED | OUTPATIENT
Start: 2019-04-30 | End: 2019-04-30

## 2019-04-30 RX ORDER — SODIUM CHLORIDE, SODIUM LACTATE, POTASSIUM CHLORIDE, CALCIUM CHLORIDE 600; 310; 30; 20 MG/100ML; MG/100ML; MG/100ML; MG/100ML
9 INJECTION, SOLUTION INTRAVENOUS CONTINUOUS
Status: DISCONTINUED | OUTPATIENT
Start: 2019-04-30 | End: 2019-05-02 | Stop reason: HOSPADM

## 2019-04-30 RX ORDER — MELOXICAM 7.5 MG/1
15 TABLET ORAL ONCE
Status: COMPLETED | OUTPATIENT
Start: 2019-04-30 | End: 2019-04-30

## 2019-04-30 RX ORDER — ONDANSETRON 2 MG/ML
4 INJECTION INTRAMUSCULAR; INTRAVENOUS EVERY 6 HOURS PRN
Status: DISCONTINUED | OUTPATIENT
Start: 2019-04-30 | End: 2019-05-02 | Stop reason: HOSPADM

## 2019-04-30 RX ORDER — FENTANYL CITRATE 50 UG/ML
25 INJECTION, SOLUTION INTRAMUSCULAR; INTRAVENOUS
Status: DISCONTINUED | OUTPATIENT
Start: 2019-04-30 | End: 2019-04-30 | Stop reason: HOSPADM

## 2019-04-30 RX ORDER — PROPOFOL 10 MG/ML
VIAL (ML) INTRAVENOUS AS NEEDED
Status: DISCONTINUED | OUTPATIENT
Start: 2019-04-30 | End: 2019-04-30 | Stop reason: SURG

## 2019-04-30 RX ORDER — PREGABALIN 75 MG/1
150 CAPSULE ORAL ONCE
Status: COMPLETED | OUTPATIENT
Start: 2019-04-30 | End: 2019-04-30

## 2019-04-30 RX ORDER — MAGNESIUM HYDROXIDE 1200 MG/15ML
LIQUID ORAL AS NEEDED
Status: DISCONTINUED | OUTPATIENT
Start: 2019-04-30 | End: 2019-04-30 | Stop reason: HOSPADM

## 2019-04-30 RX ORDER — BUPRENORPHINE AND NALOXONE 8; 2 MG/1; MG/1
0.5 FILM, SOLUBLE BUCCAL; SUBLINGUAL 2 TIMES DAILY
Status: DISCONTINUED | OUTPATIENT
Start: 2019-05-01 | End: 2019-05-02 | Stop reason: HOSPADM

## 2019-04-30 RX ORDER — ROPIVACAINE HYDROCHLORIDE 2 MG/ML
INJECTION, SOLUTION EPIDURAL; INFILTRATION; PERINEURAL AS NEEDED
Status: DISCONTINUED | OUTPATIENT
Start: 2019-04-30 | End: 2019-04-30 | Stop reason: SURG

## 2019-04-30 RX ORDER — ACETAMINOPHEN 500 MG
1000 TABLET ORAL ONCE
Status: COMPLETED | OUTPATIENT
Start: 2019-04-30 | End: 2019-04-30

## 2019-04-30 RX ORDER — BUPIVACAINE HYDROCHLORIDE 5 MG/ML
INJECTION, SOLUTION EPIDURAL; INTRACAUDAL AS NEEDED
Status: DISCONTINUED | OUTPATIENT
Start: 2019-04-30 | End: 2019-04-30 | Stop reason: SURG

## 2019-04-30 RX ORDER — LANOLIN ALCOHOL/MO/W.PET/CERES
1000 CREAM (GRAM) TOPICAL DAILY
Status: DISCONTINUED | OUTPATIENT
Start: 2019-04-30 | End: 2019-05-02 | Stop reason: HOSPADM

## 2019-04-30 RX ORDER — ACETAMINOPHEN 650 MG/1
650 SUPPOSITORY RECTAL ONCE AS NEEDED
Status: DISCONTINUED | OUTPATIENT
Start: 2019-04-30 | End: 2019-04-30 | Stop reason: HOSPADM

## 2019-04-30 RX ORDER — ALBUTEROL SULFATE 2.5 MG/3ML
2.5 SOLUTION RESPIRATORY (INHALATION) EVERY 6 HOURS PRN
Status: DISCONTINUED | OUTPATIENT
Start: 2019-04-30 | End: 2019-05-02 | Stop reason: HOSPADM

## 2019-04-30 RX ORDER — SODIUM CHLORIDE, SODIUM LACTATE, POTASSIUM CHLORIDE, CALCIUM CHLORIDE 600; 310; 30; 20 MG/100ML; MG/100ML; MG/100ML; MG/100ML
100 INJECTION, SOLUTION INTRAVENOUS CONTINUOUS
Status: DISCONTINUED | OUTPATIENT
Start: 2019-04-30 | End: 2019-05-02 | Stop reason: HOSPADM

## 2019-04-30 RX ORDER — PROPOFOL 10 MG/ML
VIAL (ML) INTRAVENOUS CONTINUOUS PRN
Status: DISCONTINUED | OUTPATIENT
Start: 2019-04-30 | End: 2019-04-30 | Stop reason: SURG

## 2019-04-30 RX ORDER — DULOXETIN HYDROCHLORIDE 60 MG/1
60 CAPSULE, DELAYED RELEASE ORAL DAILY
Status: DISCONTINUED | OUTPATIENT
Start: 2019-04-30 | End: 2019-05-02 | Stop reason: HOSPADM

## 2019-04-30 RX ORDER — LIDOCAINE HYDROCHLORIDE 20 MG/ML
INJECTION, SOLUTION INFILTRATION; PERINEURAL AS NEEDED
Status: DISCONTINUED | OUTPATIENT
Start: 2019-04-30 | End: 2019-04-30 | Stop reason: SURG

## 2019-04-30 RX ORDER — GABAPENTIN 400 MG/1
800 CAPSULE ORAL 4 TIMES DAILY
Status: DISCONTINUED | OUTPATIENT
Start: 2019-04-30 | End: 2019-05-02 | Stop reason: HOSPADM

## 2019-04-30 RX ORDER — NICOTINE 21 MG/24HR
1 PATCH, TRANSDERMAL 24 HOURS TRANSDERMAL DAILY PRN
Status: DISCONTINUED | OUTPATIENT
Start: 2019-04-30 | End: 2019-05-02 | Stop reason: HOSPADM

## 2019-04-30 RX ORDER — CEFAZOLIN SODIUM 2 G/50ML
2 SOLUTION INTRAVENOUS ONCE
Status: COMPLETED | OUTPATIENT
Start: 2019-04-30 | End: 2019-04-30

## 2019-04-30 RX ORDER — CLOPIDOGREL BISULFATE 75 MG/1
75 TABLET ORAL DAILY
Status: DISCONTINUED | OUTPATIENT
Start: 2019-04-30 | End: 2019-05-02 | Stop reason: HOSPADM

## 2019-04-30 RX ORDER — ONDANSETRON 2 MG/ML
4 INJECTION INTRAMUSCULAR; INTRAVENOUS ONCE AS NEEDED
Status: DISCONTINUED | OUTPATIENT
Start: 2019-04-30 | End: 2019-04-30 | Stop reason: HOSPADM

## 2019-04-30 RX ORDER — SODIUM CHLORIDE 0.9 % (FLUSH) 0.9 %
1-10 SYRINGE (ML) INJECTION AS NEEDED
Status: DISCONTINUED | OUTPATIENT
Start: 2019-04-30 | End: 2019-04-30 | Stop reason: HOSPADM

## 2019-04-30 RX ORDER — MEPERIDINE HYDROCHLORIDE 25 MG/ML
12.5 INJECTION INTRAMUSCULAR; INTRAVENOUS; SUBCUTANEOUS
Status: DISCONTINUED | OUTPATIENT
Start: 2019-04-30 | End: 2019-04-30 | Stop reason: HOSPADM

## 2019-04-30 RX ORDER — SODIUM CHLORIDE 9 MG/ML
40 INJECTION, SOLUTION INTRAVENOUS AS NEEDED
Status: DISCONTINUED | OUTPATIENT
Start: 2019-04-30 | End: 2019-04-30 | Stop reason: HOSPADM

## 2019-04-30 RX ORDER — SODIUM CHLORIDE 0.9 % (FLUSH) 0.9 %
3 SYRINGE (ML) INJECTION EVERY 12 HOURS SCHEDULED
Status: DISCONTINUED | OUTPATIENT
Start: 2019-04-30 | End: 2019-04-30 | Stop reason: HOSPADM

## 2019-04-30 RX ORDER — SODIUM CHLORIDE 9 MG/ML
INJECTION, SOLUTION INTRAVENOUS AS NEEDED
Status: DISCONTINUED | OUTPATIENT
Start: 2019-04-30 | End: 2019-04-30 | Stop reason: HOSPADM

## 2019-04-30 RX ORDER — SODIUM CHLORIDE, SODIUM LACTATE, POTASSIUM CHLORIDE, CALCIUM CHLORIDE 600; 310; 30; 20 MG/100ML; MG/100ML; MG/100ML; MG/100ML
20 INJECTION, SOLUTION INTRAVENOUS CONTINUOUS
Status: DISCONTINUED | OUTPATIENT
Start: 2019-04-30 | End: 2019-05-02 | Stop reason: HOSPADM

## 2019-04-30 RX ORDER — ONDANSETRON 2 MG/ML
4 INJECTION INTRAMUSCULAR; INTRAVENOUS ONCE AS NEEDED
Status: COMPLETED | OUTPATIENT
Start: 2019-04-30 | End: 2019-04-30

## 2019-04-30 RX ADMIN — ONDANSETRON 4 MG: 2 INJECTION, SOLUTION INTRAMUSCULAR; INTRAVENOUS at 11:59

## 2019-04-30 RX ADMIN — CLOPIDOGREL BISULFATE 75 MG: 75 TABLET, FILM COATED ORAL at 17:52

## 2019-04-30 RX ADMIN — ROPIVACAINE HYDROCHLORIDE 20 ML: 2 INJECTION, SOLUTION EPIDURAL; INFILTRATION at 12:15

## 2019-04-30 RX ADMIN — BUPRENORPHINE HCL 4 MG: 2 TABLET SUBLINGUAL at 21:03

## 2019-04-30 RX ADMIN — CEFAZOLIN SODIUM 2 G: 2 SOLUTION INTRAVENOUS at 13:08

## 2019-04-30 RX ADMIN — LIDOCAINE HYDROCHLORIDE 0.5 ML: 10 INJECTION, SOLUTION EPIDURAL; INFILTRATION; INTRACAUDAL; PERINEURAL at 10:34

## 2019-04-30 RX ADMIN — SODIUM CHLORIDE 1000 MG: 9 INJECTION, SOLUTION INTRAVENOUS at 13:13

## 2019-04-30 RX ADMIN — LIDOCAINE HYDROCHLORIDE 50 MG: 20 INJECTION, SOLUTION INFILTRATION; PERINEURAL at 13:05

## 2019-04-30 RX ADMIN — FAMOTIDINE 20 MG: 10 INJECTION, SOLUTION INTRAVENOUS at 11:59

## 2019-04-30 RX ADMIN — ACETAMINOPHEN 1000 MG: 500 TABLET, FILM COATED ORAL at 17:49

## 2019-04-30 RX ADMIN — MELOXICAM 15 MG: 7.5 TABLET ORAL at 10:34

## 2019-04-30 RX ADMIN — LEVOTHYROXINE SODIUM 25 MCG: 25 TABLET ORAL at 17:51

## 2019-04-30 RX ADMIN — CYANOCOBALAMIN TAB 1000 MCG 1000 MCG: 1000 TAB at 17:50

## 2019-04-30 RX ADMIN — ACETAMINOPHEN 1000 MG: 500 TABLET, FILM COATED ORAL at 10:34

## 2019-04-30 RX ADMIN — BUPIVACAINE HYDROCHLORIDE 2 ML: 5 INJECTION, SOLUTION EPIDURAL; INTRACAUDAL; PERINEURAL at 12:51

## 2019-04-30 RX ADMIN — DEXAMETHASONE SODIUM PHOSPHATE 8 MG: 4 INJECTION, SOLUTION INTRAMUSCULAR; INTRAVENOUS at 12:00

## 2019-04-30 RX ADMIN — NICOTINE 1 PATCH: 14 PATCH TRANSDERMAL at 21:02

## 2019-04-30 RX ADMIN — PROPOFOL 100 MCG/KG/MIN: 10 INJECTION, EMULSION INTRAVENOUS at 13:11

## 2019-04-30 RX ADMIN — ACETAMINOPHEN 1000 MG: 500 TABLET, FILM COATED ORAL at 23:21

## 2019-04-30 RX ADMIN — PROPOFOL 30 MG: 10 INJECTION, EMULSION INTRAVENOUS at 13:14

## 2019-04-30 RX ADMIN — VANCOMYCIN HYDROCHLORIDE 1500 MG: 500 INJECTION, POWDER, LYOPHILIZED, FOR SOLUTION INTRAVENOUS at 11:34

## 2019-04-30 RX ADMIN — SODIUM CHLORIDE, POTASSIUM CHLORIDE, SODIUM LACTATE AND CALCIUM CHLORIDE 9 ML/HR: 600; 310; 30; 20 INJECTION, SOLUTION INTRAVENOUS at 10:34

## 2019-04-30 RX ADMIN — SODIUM CHLORIDE 1000 MG: 9 INJECTION, SOLUTION INTRAVENOUS at 14:26

## 2019-04-30 RX ADMIN — GABAPENTIN 800 MG: 400 CAPSULE ORAL at 21:03

## 2019-04-30 RX ADMIN — CEFAZOLIN SODIUM 2 G: 2 SOLUTION INTRAVENOUS at 21:02

## 2019-04-30 RX ADMIN — SODIUM CHLORIDE, POTASSIUM CHLORIDE, SODIUM LACTATE AND CALCIUM CHLORIDE 100 ML/HR: 600; 310; 30; 20 INJECTION, SOLUTION INTRAVENOUS at 15:11

## 2019-04-30 RX ADMIN — PREGABALIN 150 MG: 75 CAPSULE ORAL at 10:34

## 2019-04-30 RX ADMIN — GABAPENTIN 800 MG: 400 CAPSULE ORAL at 17:50

## 2019-04-30 NOTE — ANESTHESIA PROCEDURE NOTES
Peripheral Block      Patient reassessed immediately prior to procedure    Patient location during procedure: pre-op  Start time: 4/30/2019 12:14 PM  Stop time: 4/30/2019 12:15 PM  Reason for block: at surgeon's request and post-op pain management  Performed by  CRNA: Viridiana Mullen CRNA  Preanesthetic Checklist  Completed: patient identified, site marked, surgical consent, pre-op evaluation, timeout performed, IV checked, risks and benefits discussed and monitors and equipment checked  Prep:  Pt Position: sitting  Sterile barriers:cap and gloves  Prep: ChloraPrep  Patient monitoring: blood pressure monitoring, continuous pulse oximetry and EKG  Procedure  Sedation:no    Guidance:landmark technique  Images:still images obtained, printed/placed on chart    Laterality:right  Block Type:adductor canal block  Injection Technique:single-shot  Needle Type:echogenic  Needle Gauge:21 G  Resistance on Injection: none    Post Assessment  Injection Assessment: negative aspiration for heme, no paresthesia on injection and incremental injection  Patient Tolerance:comfortable throughout block  Complications:no  Additional Notes  Lidocaine 1ml 1% skin infiltration

## 2019-04-30 NOTE — ANESTHESIA POSTPROCEDURE EVALUATION
Patient: Gasper Greene    Procedure Summary     Date:  04/30/19 Room / Location:   [facility] /  [facility]    Anesthesia Start:  1300 Anesthesia Stop:  1514    Procedure:  RIGHT TOTAL KNEE ARTHROPLASTY-[facility] (Right Knee) Diagnosis:       Primary osteoarthritis of right knee      (Primary osteoarthritis of right knee [M17.11])    Surgeon:  Bob Curry MD Provider:  Caleb Cheung CRNA    Anesthesia Type:  regional, spinal, MAC ASA Status:  3          Anesthesia Type: regional, spinal, MAC  Last vitals  BP   101/70 (04/30/19 1545)   Temp   96.2 °F (35.7 °C) (04/30/19 1511)   Pulse   50 (04/30/19 1545)   Resp   14 (04/30/19 1545)     SpO2   94 % (04/30/19 1545)     Post Anesthesia Care and Evaluation    Patient location during evaluation: PACU  Patient participation: complete - patient participated  Level of consciousness: awake and alert  Pain score: 0  Pain management: adequate  Airway patency: patent  Anesthetic complications: No anesthetic complications  PONV Status: none  Cardiovascular status: acceptable  Respiratory status: acceptable  Hydration status: acceptable

## 2019-04-30 NOTE — ANESTHESIA PROCEDURE NOTES
Spinal Block      Patient reassessed immediately prior to procedure    Patient location during procedure: pre-op  Start Time: 4/30/2019 12:49 PM  Stop Time: 4/30/2019 12:51 PM  Indication:at surgeon's request and procedure for pain  Performed By  CRNA: Viridiana Mullen CRNA  Preanesthetic Checklist  Completed: patient identified, site marked, surgical consent, pre-op evaluation, timeout performed, IV checked, risks and benefits discussed and monitors and equipment checked  Spinal Block Prep:  Patient Position:sitting  Sterile Tech:cap, gloves, mask and sterile barriers  Prep:Betadine  Patient Monitoring:blood pressure monitoring, continuous pulse oximetry and EKG  Spinal Block Procedure  Approach:midline  Guidance:landmark technique and palpation technique  Location:L3-L4  Needle Type:Pencan  Needle Gauge:27 G  Placement of Spinal needle event:cerebrospinal fluid aspirated  Paresthesia: no  Fluid Appearance:clear     Post Assessment  Patient Tolerance:patient tolerated the procedure well with no apparent complications  Complications no  Additional Notes  Lidocaine 2% skin infiltration 1ml

## 2019-05-01 LAB
ABO GROUP BLD: NORMAL
APTT PPP: 28.7 SECONDS (ref 24.3–38.1)
BASOPHILS # BLD AUTO: 0.01 10*3/MM3 (ref 0–0.2)
BASOPHILS NFR BLD AUTO: 0.1 % (ref 0–1.5)
DEPRECATED RDW RBC AUTO: 50.3 FL (ref 37–54)
EOSINOPHIL # BLD AUTO: 0.01 10*3/MM3 (ref 0–0.4)
EOSINOPHIL NFR BLD AUTO: 0.1 % (ref 0.3–6.2)
ERYTHROCYTE [DISTWIDTH] IN BLOOD BY AUTOMATED COUNT: 13.2 % (ref 12.3–15.4)
HCT VFR BLD AUTO: 34.9 % (ref 37.5–51)
HGB BLD-MCNC: 11.3 G/DL (ref 13–17.7)
IMM GRANULOCYTES # BLD AUTO: 0.05 10*3/MM3 (ref 0–0.05)
IMM GRANULOCYTES NFR BLD AUTO: 0.4 % (ref 0–0.5)
LYMPHOCYTES # BLD AUTO: 1.11 10*3/MM3 (ref 0.7–3.1)
LYMPHOCYTES NFR BLD AUTO: 9.7 % (ref 19.6–45.3)
MCH RBC QN AUTO: 33.3 PG (ref 26.6–33)
MCHC RBC AUTO-ENTMCNC: 32.4 G/DL (ref 31.5–35.7)
MCV RBC AUTO: 102.9 FL (ref 79–97)
MONOCYTES # BLD AUTO: 0.54 10*3/MM3 (ref 0.1–0.9)
MONOCYTES NFR BLD AUTO: 4.7 % (ref 5–12)
NEUTROPHILS # BLD AUTO: 9.7 10*3/MM3 (ref 1.7–7)
NEUTROPHILS NFR BLD AUTO: 85 % (ref 42.7–76)
NRBC BLD AUTO-RTO: 0 /100 WBC (ref 0–0.2)
PLATELET # BLD AUTO: 209 10*3/MM3 (ref 140–450)
PMV BLD AUTO: 10 FL (ref 6–12)
RBC # BLD AUTO: 3.39 10*6/MM3 (ref 4.14–5.8)
RH BLD: NEGATIVE
WBC NRBC COR # BLD: 11.42 10*3/MM3 (ref 3.4–10.8)

## 2019-05-01 PROCEDURE — 86900 BLOOD TYPING SEROLOGIC ABO: CPT

## 2019-05-01 PROCEDURE — 97161 PT EVAL LOW COMPLEX 20 MIN: CPT

## 2019-05-01 PROCEDURE — G0378 HOSPITAL OBSERVATION PER HR: HCPCS

## 2019-05-01 PROCEDURE — 99213 OFFICE O/P EST LOW 20 MIN: CPT | Performed by: HOSPITALIST

## 2019-05-01 PROCEDURE — 25010000003 CEFAZOLIN SODIUM-DEXTROSE 2-3 GM-%(50ML) RECONSTITUTED SOLUTION: Performed by: ORTHOPAEDIC SURGERY

## 2019-05-01 PROCEDURE — 97165 OT EVAL LOW COMPLEX 30 MIN: CPT

## 2019-05-01 PROCEDURE — 99024 POSTOP FOLLOW-UP VISIT: CPT | Performed by: ORTHOPAEDIC SURGERY

## 2019-05-01 PROCEDURE — 85730 THROMBOPLASTIN TIME PARTIAL: CPT | Performed by: ORTHOPAEDIC SURGERY

## 2019-05-01 PROCEDURE — 86901 BLOOD TYPING SEROLOGIC RH(D): CPT

## 2019-05-01 PROCEDURE — 85025 COMPLETE CBC W/AUTO DIFF WBC: CPT | Performed by: ORTHOPAEDIC SURGERY

## 2019-05-01 RX ORDER — TRAMADOL HYDROCHLORIDE 50 MG/1
50 TABLET ORAL ONCE
Status: COMPLETED | OUTPATIENT
Start: 2019-05-01 | End: 2019-05-01

## 2019-05-01 RX ADMIN — DULOXETINE HYDROCHLORIDE 60 MG: 60 CAPSULE, DELAYED RELEASE ORAL at 08:32

## 2019-05-01 RX ADMIN — GABAPENTIN 800 MG: 400 CAPSULE ORAL at 20:06

## 2019-05-01 RX ADMIN — ACETAMINOPHEN 1000 MG: 500 TABLET, FILM COATED ORAL at 22:21

## 2019-05-01 RX ADMIN — CEFAZOLIN SODIUM 2 G: 2 SOLUTION INTRAVENOUS at 05:33

## 2019-05-01 RX ADMIN — LEVOTHYROXINE SODIUM 25 MCG: 25 TABLET ORAL at 08:32

## 2019-05-01 RX ADMIN — ACETAMINOPHEN 1000 MG: 500 TABLET, FILM COATED ORAL at 05:33

## 2019-05-01 RX ADMIN — RIVAROXABAN 10 MG: 10 TABLET, FILM COATED ORAL at 08:32

## 2019-05-01 RX ADMIN — GABAPENTIN 800 MG: 400 CAPSULE ORAL at 17:22

## 2019-05-01 RX ADMIN — NICOTINE 1 PATCH: 14 PATCH TRANSDERMAL at 20:08

## 2019-05-01 RX ADMIN — CYANOCOBALAMIN TAB 1000 MCG 1000 MCG: 1000 TAB at 08:33

## 2019-05-01 RX ADMIN — GABAPENTIN 800 MG: 400 CAPSULE ORAL at 11:48

## 2019-05-01 RX ADMIN — BUPRENORPHINE AND NALOXONE 0.5 FILM: 8; 2 FILM, SOLUBLE BUCCAL; SUBLINGUAL at 08:57

## 2019-05-01 RX ADMIN — GABAPENTIN 800 MG: 400 CAPSULE ORAL at 08:32

## 2019-05-01 RX ADMIN — BUPRENORPHINE AND NALOXONE 0.5 FILM: 8; 2 FILM, SOLUBLE BUCCAL; SUBLINGUAL at 20:07

## 2019-05-01 RX ADMIN — TRAMADOL HYDROCHLORIDE 50 MG: 50 TABLET, FILM COATED ORAL at 17:46

## 2019-05-01 RX ADMIN — BUSPIRONE HYDROCHLORIDE 10 MG: 5 TABLET ORAL at 18:14

## 2019-05-01 RX ADMIN — ACETAMINOPHEN 1000 MG: 500 TABLET, FILM COATED ORAL at 17:22

## 2019-05-01 RX ADMIN — ACETAMINOPHEN 1000 MG: 500 TABLET, FILM COATED ORAL at 11:48

## 2019-05-01 RX ADMIN — CLOPIDOGREL BISULFATE 75 MG: 75 TABLET, FILM COATED ORAL at 08:32

## 2019-05-01 NOTE — PLAN OF CARE
Problem: Patient Care Overview  Goal: Plan of Care Review   05/01/19 1102   Coping/Psychosocial   Plan of Care Reviewed With patient   OTHER   Outcome Summary PT Evaluation Complete: Patient performs supine to sit transfer with supervision, sit to/from stand transfers with CGA and gait x 84 feet with CGA and use of FWW. Patient requires cues for safety with use of AD. Gait distance limited at baseline and currently due to varus deformity and pain in L knee. Patient would benfit from skilled PT services to address deficits in functional mobility and LE strength/ROM. Plan to see patient 2x/day. Recommend outpatient PT at discharge if transport is available.

## 2019-05-01 NOTE — ACP (ADVANCE CARE PLANNING)
Advance Care Planning   Advance Care Planning Discussion:    Discussion concerning ACP and living will.  Patient completed and signed living will. Copy scanned to chart.    Elsa Taveras

## 2019-05-01 NOTE — PROGRESS NOTES
Orthopedic Progress Note   Chief Complaint: Status post right total knee    Subjective     Interval History: Patient postop day 1.  He is afebrile hemodynamically stable hemoglobin of 11.3.  Has had 140 cc a Hemovac drainage.  Pain controlled with oral Tylenol           Objective     Vital Signs  Temp:  [96.2 °F (35.7 °C)-98.2 °F (36.8 °C)] 97.9 °F (36.6 °C)  Heart Rate:  [43-63] 59  Resp:  [12-18] 16  BP: ()/(50-82) 91/50  Body mass index is 31.53 kg/m².    Intake/Output Summary (Last 24 hours) at 5/1/2019 0706  Last data filed at 5/1/2019 0305  Gross per 24 hour   Intake 1280 ml   Output 1590 ml   Net -310 ml     No intake/output data recorded.       Physical Exam:   General: patient awake, alert and cooperative   Cardiovascular: regular rhythm and rate   Pulm: clear to auscultation bilaterally   Abdomen: Benign.  Soft bowel sounds   Extremities:Right leg is good distal pulses no motor or sensory deficit.   Neurologic: Normal mood and behavior     Results Review:     I reviewed the patient's new clinical results.      WBC No results found for: WBCS   HGB Hemoglobin   Date Value Ref Range Status   05/01/2019 11.3 (L) 13.0 - 17.7 g/dL Final      HCT Hematocrit   Date Value Ref Range Status   05/01/2019 34.9 (L) 37.5 - 51.0 % Final      Platlets No results found for: LABPLAT     PT/INR:  No results found for: PROTIME/No results found for: INR    Sodium No results found for: NA   Potassium No results found for: K   Chloride No results found for: CL   Bicarbonate No results found for: PLASMABICARB   BUN No results found for: BUN   Creatinine No results found for: CREATININE   Calcium No results found for: CALCIUM   Magnesium  AST  ALT  Bilirubin, Total  AlkPhos  Albumin    Amylase  Lipase    Radiology: No results found for: MG  No components found for: AST.*  No components found for: ALT.*  No components found for: BILIRUBIN, TOTAL.*    No components found for: ALKPHOS.*  No components found for:  ALBUMIN.*      No components found for: AMYLASE.*  No components found for: LIPASE.*            Imaging Results (most recent)     Procedure Component Value Units Date/Time    XR Knee 1 or 2 View Right [[id number]] Collected:  04/30/19 1525     Updated:  04/30/19 1529    Narrative:       XR KNEE 1 OR 2 VW RIGHT-: 4/30/2019 3:05 PM     INDICATION:   Status post total knee replacement..     COMPARISON:   01/17/2019.     FINDINGS:  2 view(s) of the right knee.  Status post total right knee replacement.  Surgical hardware appears to be in satisfactory position. Expected  postoperative changes include soft tissue swelling and subcutaneous  emphysema. Radiopaque drain noted. Both of the views are degraded by  overlying support equipment artifact.. No bone erosion or destruction.   No foreign body.       Impression:       Satisfactory postoperative appearance of the right knee..     This report was finalized on 4/30/2019 3:27 PM by Dr. Holden Ortiz MD.                  lactated ringers 9 mL/hr Last Rate: Stopped (04/30/19 1511)   lactated ringers 100 mL/hr Last Rate: Stopped (04/30/19 1753)   lactated ringers 20 mL/hr Last Rate: Stopped (04/30/19 1753)         Assessment/Plan     Patient Active Problem List   Diagnosis Code   • Primary osteoarthritis of right knee M17.11   • Osteoarthritis of right knee M17.11         DC Hemovac drainage.  Continue PT OT.  Home probably tomorrow    Bob Curry MD  05/01/19  7:06 AM          Dictated utilizing Dragon dictation

## 2019-05-01 NOTE — PROGRESS NOTES
Patient: Gasper Greene  Procedure(s):  RIGHT TOTAL KNEE ARTHROPLASTY-[place], Orth [facility]  Anesthesia type: [unfilled]    Patient location: [facility] Surgical Floor  Vitals:    04/30/19 1900 04/30/19 2304 05/01/19 0305 05/01/19 0639   BP: 97/61 90/61 90/53 91/50   BP Location: Left arm Left arm Left arm Left arm   Patient Position: Lying Lying Lying Lying   Pulse: 58 63 57 59   Resp: 15 15 18 16   Temp: 98.2 °F (36.8 °C) 98 °F (36.7 °C) 97.7 °F (36.5 °C) 97.9 °F (36.6 °C)   TempSrc: Oral Oral Oral Oral   SpO2: 91% 90% 91% 95%   Weight:       Height:         Level of consciousness: awake, alert and oriented    Post-anesthesia pain: adequate analgesia  Airway patency: patent  Respiratory: unassisted  Cardiovascular: stable and blood pressure at baseline  Hydration: euvolemic    Anesthetic complications: no

## 2019-05-01 NOTE — PAYOR COMM NOTE
"Gasper Greene (48 y.o. Male)   ATTN: KEVIN  RE: INPATIENT AUTH REQUEST  CASE PENDED REF#[id number]  PLS REPLY TO BERNIE ELLIOTT FAX# [phone] TEL# [phone]  THANK YOU...      Date of Birth Social Security Number Address Home Phone MRN    1970  [address] [id number] [phone] [id number]    [place] Marital Status          Unknown Single       Admission Date Admission Type Admitting Provider Attending Provider Department, Room/Bed    4/30/19 Elective Bob Curry MD Jacob, Eugene, MD [facility] SURG, 1423/1    Discharge Date Discharge Disposition Discharge Destination                       Attending Provider:  Bob Curry MD    Allergies:  Nsaids, Celebrex [Celecoxib]    Isolation:  None   Infection:  None   Code Status:  CPR    Ht:  176.5 cm (69.5\")   Wt:  98.2 kg (216 lb 9.6 oz)    Admission Cmt:  None   Principal Problem:  Primary osteoarthritis of right knee [M17.11] More...                 Active Insurance as of 4/30/2019     Primary Coverage     Payor Plan Insurance Group Employer/Plan Group    PAS[employer]ORT PAS[employer]ORT MEDICAID     Payor Plan Address Payor Plan Phone Number Payor Plan Fax Number Effective Dates    [address] [phone]  11/1/1997 - None Entered    [facility] [address]       Subscriber Name Subscriber Birth Date Member ID       GASPER GREENE 1970 [id number]                 Emergency Contacts      (Rel.) Home Phone Work Phone Mobile Phone    MELISSA OMALLEY (Other) [phone] -- --               History & Physical      Bob Curry MD at 4/30/2019 11:04 AM        H&P reviewed. The patient was examined and there are no changes to the H&P.vital  /77 (BP Location: Left arm, Patient Position: Lying)   Pulse 59   Temp 98.1 °F (36.7 °C) (Oral)   Resp 16   Ht 176.5 cm (69.5\")   Wt 98.2 kg (216 lb 9.6 oz)   SpO2 93%   BMI 31.53 kg/m²      Electronically signed by Bob Curry MD at 4/30/2019 11:04 AM   Source " Note             Orthopedic Surgery    Patient Care Team:  Nila Anne APRN as PCP - General (Family Medicine)    CHIEF COMPLAINT:Arthritis right knee    HISTORY OF PRESENT ILLNESS: 48-year-old male with osteoarthritis of the right knee that failed to respond to management of anti-inflammatory medications, cortisone injections, Visco supplement injections, bracing and modification of activity is being admitted this time to undergo a right total knee arthroplasty.  I discussed in detail with the patient the risk of surgery to include but not limited to infection and the need for multiple procedures including implant removal to eradicate the infection, DVT, periprosthetic fracture, nerve injury and foot drop, vascular surgery, the need for revision surgery and the fact that 10 to 15% is still a pain despite a well implanted total knee.  Discussed the use of intraoperative navigational system.  Discussed the rehab which is 3 months to year and he understands.  Patient agrees to proceed with the surgery          Past Medical History:   Diagnosis Date   • Arthritis    • Chest pain     states has been told d/t anxiety   • Coronary artery disease 09/2017    s/p stents    • Frequent urination at night    • History of bleeding ulcers 2012   • History of GI bleed 2012   • History of MI (myocardial infarction) 09/2017    posterior, Dr Napier follows   • History of palpitations     states has pain w/, cardiologist aware   • History of transfusion    • Hyperlipidemia    • Hypothyroidism    • Ischemic cardiomyopathy    • Left knee DJD    • Neuropathy of both feet    • NSVT (nonsustained ventricular tachycardia) (CMS/HCC)     h/o of after MI 2017   • Osteomyelitis of right foot (CMS/HCC)     h/o   • Right knee DJD     sched TKA   • Sleep apnea     no machine     Past Surgical History:   Procedure Laterality Date   • CARDIAC CATHETERIZATION  09/2017   • CORONARY ANGIOPLASTY WITH STENT PLACEMENT  09/2017   • KNEE ACL  RECONSTRUCTION Left 1992   • KNEE SURGERY Right     tendon rupture and repair/replace   • METATARSAL OSTEOTOMY Right 02/2019    2nd   • STOMACH SURGERY      d/t bleeding ulcer     Family History   Problem Relation Age of Onset   • Lung cancer Mother    • Alcohol abuse Father    • Seizures Father    • Diabetes Maternal Aunt    • Diabetes Maternal Uncle    • Alcohol abuse Paternal Aunt    • Alcohol abuse Paternal Uncle    • Heart disease Maternal Grandmother    • Alcohol abuse Paternal Grandmother    • Cirrhosis Paternal Grandmother    • Hypertension Paternal Grandfather    • Malig Hyperthermia Neg Hx    Is  Social History     Tobacco Use   • Smoking status: Current Every Day Smoker     Packs/day: 1.00     Years: 35.00     Pack years: 35.00     Types: Cigarettes   • Smokeless tobacco: Former User   Substance Use Topics   • Alcohol use: No     Frequency: Never     Comment: h/o stopped in 2000   • Drug use: Yes     Types: Methamphetamines     Comment: history of, last 2/2019     No medications prior to admission.     No current facility-administered medications for this encounter.     Current Outpatient Medications:   •  albuterol (PROVENTIL) (2.5 MG/3ML) 0.083% nebulizer solution, Take  by nebulization Every 6 (Six) Hours As Needed for Wheezing or Shortness of Air., Disp: , Rfl:   •  aspirin 81 MG EC tablet, Take 81 mg by mouth Daily., Disp: , Rfl:   •  buprenorphine-naloxone (SUBOXONE) 8-2 MG per SL tablet, Place 0.5 tablets under the tongue 2 (Two) Times a Day. Takes half in morning and half in afternoon, Disp: , Rfl:   •  busPIRone (BUSPAR) 10 MG tablet, Take 10 mg by mouth 3 (Three) Times a Day As Needed (anxiety)., Disp: , Rfl:   •  Cholecalciferol (VITAMIN D PO), Take 1 tablet by mouth Daily., Disp: , Rfl:   •  clopidogrel (PLAVIX) 75 MG tablet, Take 75 mg by mouth Daily., Disp: , Rfl:   •  Coenzyme Q10 (COQ10 PO), Take 1 tablet by mouth Daily., Disp: , Rfl:   •  cyanocobalamin (CYANOCOBALAMIN) 100 MCG tablet  tablet, Take 1,000 mcg by mouth Daily., Disp: , Rfl:   •  DULoxetine (CYMBALTA) 60 MG capsule, Take 60 mg by mouth Daily., Disp: , Rfl:   •  gabapentin (NEURONTIN) 800 MG tablet, Take 800 mg by mouth 3 (Three) Times a Day. One tablet twice a day and 2 tablets at bedtime, Disp: , Rfl:   •  levothyroxine (SYNTHROID, LEVOTHROID) 25 MCG tablet, Take 25 mcg by mouth Daily., Disp: , Rfl:   •  lidocaine-prilocaine (EMLA) 2.5-2.5 % cream, Apply 1 application topically to the appropriate area as directed 3 (Three) Times a Day As Needed (neuropathy)., Disp: , Rfl:   •  Multiple Vitamins-Minerals (MULTIVITAMIN WITH MINERALS) tablet tablet, Take 1 tablet by mouth Daily., Disp: , Rfl:   •  mupirocin (BACTROBAN) 2 % ointment, 1 application into the nostril(s) as directed by provider 3 (Three) Times a Day. 5 days prior to surgery., Disp: 22 g, Rfl: 0    There is no immunization history on file for this patient.  Allergies:  Nsaids and Celebrex [celecoxib]    REVIEW OF SYSTEMS:  Please see the above history of present illness for pertinent positives and negatives.  The remainder of the patient's systems have been reviewed and are negative.    Vital Signs            Physical Exam:  Physical Exam   Constitutional: Patient appears well-developed and well-nourished and in no acute distress   HEENT:   Head: Normocephalic and atraumatic.   Eyes:  Pupils are equal, round, and reactive to light.  Mouth and Throat: Patient has moist mucous membranes. Oropharynx is clear of any erythema or exudate.     Neck: Neck supple. No JVD present. No thyromegaly present. No lymphadenopathy present.  Cardiovascular: Regular rate, regular rhythm.  Pulmonary/Chest: Lungs are clear to auscultation bilaterally.  Abdominal:benign,soft with bowel sounds  Musculoskeletal: Normal posture.  Extremities: Right leg has good pulses no motor or sensory deficit.   degrees of motion with no instability or patella subluxation.  Quad function 5/5 in the calf is  nontender.  Good capillary refill.  Mild varus deformity.  Neurological: Patient is alert and oriented.  Psychological:   Mood and behavior appropriate.  Skin: Skin is warm and dry.  Debilities/Disabilities Identified: None   Results Review:    I reviewed the patient's new clinical results.  Lab Results (most recent)     None          Imaging Results (most recent)     None            ECG/EMG Results (most recent)     None          Assessment/Plan As arthritis right knee        I discussed the patients findings and my recommendations with patient and plan to proceed with right total knee arthroplasty    Bob Curry MD  04/29/19  12:23 PM             Electronically signed by Bob Curry MD at 4/29/2019  3:29 PM             Bob Curry MD at 4/29/2019 12:23 PM          Orthopedic Surgery    Patient Care Team:  Nila Anne APRN as PCP - General (Family Medicine)    CHIEF COMPLAINT:Arthritis right knee    HISTORY OF PRESENT ILLNESS: 48-year-old male with osteoarthritis of the right knee that failed to respond to management of anti-inflammatory medications, cortisone injections, Visco supplement injections, bracing and modification of activity is being admitted this time to undergo a right total knee arthroplasty.  I discussed in detail with the patient the risk of surgery to include but not limited to infection and the need for multiple procedures including implant removal to eradicate the infection, DVT, periprosthetic fracture, nerve injury and foot drop, vascular surgery, the need for revision surgery and the fact that 10 to 15% is still a pain despite a well implanted total knee.  Discussed the use of intraoperative navigational system.  Discussed the rehab which is 3 months to year and he understands.  Patient agrees to proceed with the surgery          Past Medical History:   Diagnosis Date   • Arthritis    • Chest pain     states has been told d/t anxiety   • Coronary artery disease 09/2017    s/p  stents    • Frequent urination at night    • History of bleeding ulcers 2012   • History of GI bleed 2012   • History of MI (myocardial infarction) 09/2017    posterior, Dr Napier follows   • History of palpitations     states has pain w/, cardiologist aware   • History of transfusion    • Hyperlipidemia    • Hypothyroidism    • Ischemic cardiomyopathy    • Left knee DJD    • Neuropathy of both feet    • NSVT (nonsustained ventricular tachycardia) (CMS/HCC)     h/o of after MI 2017   • Osteomyelitis of right foot (CMS/HCC)     h/o   • Right knee DJD     sched TKA   • Sleep apnea     no machine     Past Surgical History:   Procedure Laterality Date   • CARDIAC CATHETERIZATION  09/2017   • CORONARY ANGIOPLASTY WITH STENT PLACEMENT  09/2017   • KNEE ACL RECONSTRUCTION Left 1992   • KNEE SURGERY Right     tendon rupture and repair/replace   • METATARSAL OSTEOTOMY Right 02/2019 2nd   • STOMACH SURGERY      d/t bleeding ulcer     Family History   Problem Relation Age of Onset   • Lung cancer Mother    • Alcohol abuse Father    • Seizures Father    • Diabetes Maternal Aunt    • Diabetes Maternal Uncle    • Alcohol abuse Paternal Aunt    • Alcohol abuse Paternal Uncle    • Heart disease Maternal Grandmother    • Alcohol abuse Paternal Grandmother    • Cirrhosis Paternal Grandmother    • Hypertension Paternal Grandfather    • Malig Hyperthermia Neg Hx    Is  Social History     Tobacco Use   • Smoking status: Current Every Day Smoker     Packs/day: 1.00     Years: 35.00     Pack years: 35.00     Types: Cigarettes   • Smokeless tobacco: Former User   Substance Use Topics   • Alcohol use: No     Frequency: Never     Comment: h/o stopped in 2000   • Drug use: Yes     Types: Methamphetamines     Comment: history of, last 2/2019     No medications prior to admission.     No current facility-administered medications for this encounter.     Current Outpatient Medications:   •  albuterol (PROVENTIL) (2.5 MG/3ML) 0.083% nebulizer  solution, Take  by nebulization Every 6 (Six) Hours As Needed for Wheezing or Shortness of Air., Disp: , Rfl:   •  aspirin 81 MG EC tablet, Take 81 mg by mouth Daily., Disp: , Rfl:   •  buprenorphine-naloxone (SUBOXONE) 8-2 MG per SL tablet, Place 0.5 tablets under the tongue 2 (Two) Times a Day. Takes half in morning and half in afternoon, Disp: , Rfl:   •  busPIRone (BUSPAR) 10 MG tablet, Take 10 mg by mouth 3 (Three) Times a Day As Needed (anxiety)., Disp: , Rfl:   •  Cholecalciferol (VITAMIN D PO), Take 1 tablet by mouth Daily., Disp: , Rfl:   •  clopidogrel (PLAVIX) 75 MG tablet, Take 75 mg by mouth Daily., Disp: , Rfl:   •  Coenzyme Q10 (COQ10 PO), Take 1 tablet by mouth Daily., Disp: , Rfl:   •  cyanocobalamin (CYANOCOBALAMIN) 100 MCG tablet tablet, Take 1,000 mcg by mouth Daily., Disp: , Rfl:   •  DULoxetine (CYMBALTA) 60 MG capsule, Take 60 mg by mouth Daily., Disp: , Rfl:   •  gabapentin (NEURONTIN) 800 MG tablet, Take 800 mg by mouth 3 (Three) Times a Day. One tablet twice a day and 2 tablets at bedtime, Disp: , Rfl:   •  levothyroxine (SYNTHROID, LEVOTHROID) 25 MCG tablet, Take 25 mcg by mouth Daily., Disp: , Rfl:   •  lidocaine-prilocaine (EMLA) 2.5-2.5 % cream, Apply 1 application topically to the appropriate area as directed 3 (Three) Times a Day As Needed (neuropathy)., Disp: , Rfl:   •  Multiple Vitamins-Minerals (MULTIVITAMIN WITH MINERALS) tablet tablet, Take 1 tablet by mouth Daily., Disp: , Rfl:   •  mupirocin (BACTROBAN) 2 % ointment, 1 application into the nostril(s) as directed by provider 3 (Three) Times a Day. 5 days prior to surgery., Disp: 22 g, Rfl: 0    There is no immunization history on file for this patient.  Allergies:  Nsaids and Celebrex [celecoxib]    REVIEW OF SYSTEMS:  Please see the above history of present illness for pertinent positives and negatives.  The remainder of the patient's systems have been reviewed and are negative.    Vital Signs            Physical  Exam:  Physical Exam   Constitutional: Patient appears well-developed and well-nourished and in no acute distress   HEENT:   Head: Normocephalic and atraumatic.   Eyes:  Pupils are equal, round, and reactive to light.  Mouth and Throat: Patient has moist mucous membranes. Oropharynx is clear of any erythema or exudate.     Neck: Neck supple. No JVD present. No thyromegaly present. No lymphadenopathy present.  Cardiovascular: Regular rate, regular rhythm.  Pulmonary/Chest: Lungs are clear to auscultation bilaterally.  Abdominal:benign,soft with bowel sounds  Musculoskeletal: Normal posture.  Extremities: Right leg has good pulses no motor or sensory deficit.   degrees of motion with no instability or patella subluxation.  Quad function 5/5 in the calf is nontender.  Good capillary refill.  Mild varus deformity.  Neurological: Patient is alert and oriented.  Psychological:   Mood and behavior appropriate.  Skin: Skin is warm and dry.  Debilities/Disabilities Identified: None   Results Review:    I reviewed the patient's new clinical results.  Lab Results (most recent)     None          Imaging Results (most recent)     None            ECG/EMG Results (most recent)     None          Assessment/Plan As arthritis right knee        I discussed the patients findings and my recommendations with patient and plan to proceed with right total knee arthroplasty    Bob Curry MD  04/29/19  12:23 PM            Electronically signed by Bob Curry MD at 4/29/2019  3:29 PM       ICU Vital Signs     Row Name 05/01/19 0837 05/01/19 0639 05/01/19 0305 04/30/19 2304 04/30/19 1900       Vitals    Temp  --  97.9 °F (36.6 °C)  97.7 °F (36.5 °C)  98 °F (36.7 °C)  98.2 °F (36.8 °C)    Temp src  --  Oral  Oral  Oral  Oral    Pulse  --  59  57  63  58    Heart Rate Source  --  Monitor  Monitor  Monitor  Monitor    Resp  --  16  18  15  15    Resp Rate Source  --  Visual  Visual  Visual  Visual    BP  --  91/50  90/53  90/61  97/61     BP Location  --  Left arm  Left arm  Left arm  Left arm    BP Method  --  Automatic  Automatic  Automatic  Automatic    Patient Position  --  Lying  Lying  Lying  Lying       Oxygen Therapy    SpO2  --  95 %  91 %  90 %  91 %    Device (Oxygen Therapy)  room air  nasal cannula  nasal cannula  nasal cannula  nasal cannula    Flow (L/min)  --  1  1  1  1    Row Name 04/30/19 1800 04/30/19 1727 04/30/19 1722 04/30/19 1700 04/30/19 1645       Vitals    Temp  97.6 °F (36.4 °C)  97.4 °F (36.3 °C)  --  97.6 °F (36.4 °C)  97.5 °F (36.4 °C)    Temp src  Oral  Oral  --  Oral  Oral    Pulse  62  61  52  53  53    Heart Rate Source  Monitor  Monitor  Monitor  Monitor  Monitor    Resp  16  16  --  16  16    Resp Rate Source  Visual  Visual  --  Visual  Visual    BP  112/74  114/73  --  101/67  100/67    Noninvasive MAP (mmHg)  86  82  --  76  78    BP Location  Left arm  Left arm  --  Left arm  Left arm    BP Method  Automatic  Automatic  --  Automatic  Automatic    Patient Position  Sitting  Sitting  --  Sitting  Lying       Oxygen Therapy    SpO2  95 %  94 %  98 %  96 %  96 %    Pulse Oximetry Type  Continuous  Continuous  Continuous  Intermittent  Intermittent    Device (Oxygen Therapy)  nasal cannula  nasal cannula  nasal cannula  nasal cannula  nasal cannula    Flow (L/min)  2  2  2 dec to 1 lpm  2  2    ETCO2 (mmHg)  --  --  50 mmHg  --  --    $ ETCO2 Daily Assessment (RT Only)  --  --  yes  --  --    Row Name 04/30/19 1630 04/30/19 1615 04/30/19 1550 04/30/19 1545 04/30/19 1540       Vitals    Temp  97.5 °F (36.4 °C)  97.6 °F (36.4 °C)  --  --  97.2 °F (36.2 °C)    Temp src  Oral  Oral  --  --  Temporal    Pulse  51  50  43  (Abnormal)   50  50    Heart Rate Source  Monitor  Monitor  --  --  --    Resp  14  14  14  14  14    Resp Rate Source  Visual  Visual  --  --  --    BP  101/64  99/62  101/70  101/70  102/67    Noninvasive MAP (mmHg)  77  70  83  --  82    BP Location  Left arm  Left arm  --  --  --    BP Method   Automatic  Automatic  --  --  --    Patient Position  Lying  Lying  --  --  --       Oxygen Therapy    SpO2  95 %  94 %  96 %  94 %  94 %    Pulse Oximetry Type  Intermittent  Intermittent  --  --  --    Device (Oxygen Therapy)  nasal cannula  nasal cannula  --  --  --    Flow (L/min)  2  2  2  2  2    Row Name 04/30/19 1535 04/30/19 1530 04/30/19 1525 04/30/19 1520 04/30/19 1515       Vitals    Pulse  46  (Abnormal)   48  (Abnormal)   49  (Abnormal)   50  50    Resp  14  16  12  12  14    BP  105/73  111/81  112/77  116/74  115/78    Noninvasive MAP (mmHg)  82  91  97  96  94       Oxygen Therapy    SpO2  94 %  94 %  94 %  95 %  93 %    Flow (L/min)  2  2  2  2  2    Row Name 04/30/19 1511                   Vitals    Temp  96.2 °F (35.7 °C)        Temp src  Temporal        Pulse  53        Heart Rate Source  Monitor        Resp  12        Resp Rate Source  Visual        BP  115/78        BP Location  Left arm        BP Method  Automatic        Patient Position  Lying           Oxygen Therapy    SpO2  95 %        Pulse Oximetry Type  Continuous        Device (Oxygen Therapy)  nasal cannula        Flow (L/min)  2            Lines, Drains & Airways    Active LDAs     Name:   Placement date:   Placement time:   Site:   Days:    Peripheral IV 04/30/19 1053 Right Hand   04/30/19    1053    Hand   1         Inactive LDAs     Name:   Placement date:   Placement time:   Removal date:   Removal time:   Site:   Days:    [REMOVED] Closed/Suction Drain 1 Right Knee Other (Comment) 7 Fr.   04/30/19    1431    05/01/19    0839    Knee   less than 1                Hospital Medications (all)       Dose Frequency Start End    acetaminophen (TYLENOL) tablet 1,000 mg 1,000 mg Once 4/30/2019 4/30/2019    Sig - Route: Take 2 tablets by mouth 1 (One) Time. - Oral    acetaminophen (TYLENOL) tablet 1,000 mg 1,000 mg Every 6 Hours 4/30/2019     Sig - Route: Take 2 tablets by mouth Every 6 (Six) Hours. - Oral    albuterol (PROVENTIL)  nebulizer solution 0.083% 2.5 mg/3mL 2.5 mg Every 6 Hours PRN 4/30/2019     Sig - Route: Take 2.5 mg by nebulization Every 6 (Six) Hours As Needed for Wheezing or Shortness of Air. - Nebulization    buprenorphine (SUBUTEX) SL tablet 4 mg 4 mg Once 4/30/2019 4/30/2019    Sig - Route: Place 2 tablets under the tongue 1 (One) Time. - Sublingual    buprenorphine-naloxone (SUBOXONE) 8-2 MG film 0.5 film 0.5 film 2 Times Daily 5/1/2019 5/11/2019    Sig - Route: Place 0.5 films under the tongue 2 (Two) Times a Day. - Sublingual    busPIRone (BUSPAR) tablet 10 mg 10 mg 3 Times Daily PRN 4/30/2019     Sig - Route: Take 2 tablets by mouth 3 (Three) Times a Day As Needed (anxiety). - Oral    ceFAZolin Sodium-Dextrose (ANCEF) IVPB (duplex) 2 g 2 g Once 4/30/2019 4/30/2019    Sig - Route: Infuse 2,000 mg into a venous catheter 1 (One) Time. - Intravenous    ceFAZolin Sodium-Dextrose (ANCEF) IVPB (duplex) 2 g 2 g Every 8 Hours 4/30/2019 5/1/2019    Sig - Route: Infuse 2,000 mg into a venous catheter Every 8 (Eight) Hours. - Intravenous    clopidogrel (PLAVIX) tablet 75 mg 75 mg Daily 4/30/2019     Sig - Route: Take 1 tablet by mouth Daily. - Oral    dexamethasone (DECADRON) injection 8 mg 8 mg Once 4/30/2019 4/30/2019    Sig - Route: Infuse 2 mL into a venous catheter 1 (One) Time. - Intravenous    DULoxetine (CYMBALTA) DR capsule 60 mg 60 mg Daily 4/30/2019     Sig - Route: Take 1 capsule by mouth Daily. - Oral    famotidine (PEPCID) injection 20 mg 20 mg 60 Minutes Pre-Op 4/30/2019 4/30/2019    Sig - Route: Infuse 2 mL into a venous catheter 60 Minutes Prior to Surgery. - Intravenous    gabapentin (NEURONTIN) capsule 800 mg 800 mg 4 Times Daily 4/30/2019     Sig - Route: Take 2 capsules by mouth 4 (Four) Times a Day. - Oral    lactated ringers infusion 9 mL/hr Continuous 4/30/2019     Sig - Route: Infuse 9 mL/hr into a venous catheter Continuous. - Intravenous    lactated ringers infusion 100 mL/hr Continuous 4/30/2019     Sig  "- Route: Infuse 100 mL/hr into a venous catheter Continuous. - Intravenous    lactated ringers infusion 20 mL/hr Continuous 4/30/2019     Sig - Route: Infuse 20 mL/hr into a venous catheter Continuous. - Intravenous    levothyroxine (SYNTHROID, LEVOTHROID) tablet 25 mcg 25 mcg Daily 4/30/2019     Sig - Route: Take 1 tablet by mouth Daily. - Oral    lidocaine PF 1% (XYLOCAINE) injection 0.5 mL 0.5 mL Once As Needed 4/30/2019 4/30/2019    Sig - Route: Inject 0.5 mL as directed 1 (One) Time As Needed (IV Start). - Injection    meloxicam (MOBIC) tablet 15 mg 15 mg Once 4/30/2019 4/30/2019    Sig - Route: Take 2 tablets by mouth 1 (One) Time. - Oral    nicotine (NICODERM CQ) 14 MG/24HR patch 1 patch 1 patch Daily PRN 4/30/2019     Sig - Route: Place 1 patch on the skin as directed by provider Daily As Needed (Cravings). - Transdermal    ondansetron (ZOFRAN) injection 4 mg 4 mg Once As Needed 4/30/2019 4/30/2019    Sig - Route: Infuse 2 mL into a venous catheter 1 (One) Time As Needed for Nausea or Vomiting. - Intravenous    ondansetron (ZOFRAN) injection 4 mg 4 mg Every 6 Hours PRN 4/30/2019     Sig - Route: Infuse 2 mL into a venous catheter Every 6 (Six) Hours As Needed for Nausea or Vomiting. - Intravenous    Linked Group 1:  \"Or\" Linked Group Details        ondansetron (ZOFRAN) tablet 4 mg 4 mg Every 6 Hours PRN 4/30/2019     Sig - Route: Take 1 tablet by mouth Every 6 (Six) Hours As Needed for Nausea or Vomiting. - Oral    Linked Group 1:  \"Or\" Linked Group Details        pregabalin (LYRICA) capsule 150 mg 150 mg Once 4/30/2019 4/30/2019    Sig - Route: Take 2 capsules by mouth 1 (One) Time. - Oral    rivaroxaban (XARELTO) tablet 10 mg 10 mg Daily 5/1/2019     Sig - Route: Take 1 tablet by mouth Daily. - Oral    Tranexamic Acid 2,000 mg in sodium chloride 0.9 % 100 mL 2,000 mg Once 4/30/2019 4/30/2019    Sig - Route: Apply 2,000 mg topically to the appropriate area as directed 1 (One) Time. - Topical    vancomycin " "(VANCOCIN) 1,500 mg in sodium chloride 0.9 % 500 mL IVPB 1,500 mg Once 4/30/2019 4/30/2019    Sig - Route: Infuse 1,500 mg into a venous catheter 1 (One) Time. - Intravenous    vitamin B-12 (CYANOCOBALAMIN) tablet 1,000 mcg 1,000 mcg Daily 4/30/2019     Sig - Route: Take 1 tablet by mouth Daily. - Oral    acetaminophen (TYLENOL) suppository 650 mg (Discontinued) 650 mg Once As Needed 4/30/2019 4/30/2019    Sig - Route: Insert 1 suppository into the rectum 1 (One) Time As Needed for Mild Pain . - Rectal    Reason for Discontinue: Patient Transfer    Linked Group 2:  \"Or\" Linked Group Details        acetaminophen (TYLENOL) tablet 650 mg (Discontinued) 650 mg Once As Needed 4/30/2019 4/30/2019    Sig - Route: Take 2 tablets by mouth 1 (One) Time As Needed for Mild Pain . - Oral    Reason for Discontinue: Patient Transfer    Linked Group 2:  \"Or\" Linked Group Details        bacitracin ointment (Discontinued)  As Needed 4/30/2019 4/30/2019    Sig: As Needed.    Reason for Discontinue: Patient Discharge    bupivacaine liposome (EXPAREL) 1.3 % injection 266 mg (Discontinued) 20 mL Once 4/30/2019 4/30/2019    Sig - Route: Inject 20 mL as directed 1 (One) Time. - Injection    Reason for Discontinue: Patient Transfer    ceFAZolin Sodium-Dextrose (ANCEF) IVPB (duplex) 2 g (Discontinued) 2 g Every 8 Hours 4/30/2019 4/30/2019    Sig - Route: Infuse 2,000 mg into a venous catheter Every 8 (Eight) Hours. - Intravenous    Reason for Discontinue: *Re-Entry    ceFAZolin Sodium-Dextrose (ANCEF) IVPB (duplex) 2 g (Discontinued) 2 g Every 8 Hours 4/30/2019 4/30/2019    Sig - Route: Infuse 2,000 mg into a venous catheter Every 8 (Eight) Hours. - Intravenous    Reason for Discontinue: *Re-Entry    fentaNYL citrate (PF) (SUBLIMAZE) injection 25 mcg (Discontinued) 25 mcg Every 5 Minutes PRN 4/30/2019 4/30/2019    Sig - Route: Infuse 0.5 mL into a venous catheter Every 5 (Five) Minutes As Needed for Moderate Pain  (Use for breakthrough pain, " for 4 doses. Maximum total dose of fentanyl is 100 mcg.). - Intravenous    Reason for Discontinue: Patient Transfer    meperidine (DEMEROL) injection 12.5 mg (Discontinued) 12.5 mg Every 5 Minutes PRN 4/30/2019 4/30/2019    Sig - Route: Infuse 0.5 mL into a venous catheter Every 5 (Five) Minutes As Needed for Shivering (May repeat x 1). - Intravenous    Reason for Discontinue: Patient Transfer    ondansetron (ZOFRAN) injection 4 mg (Discontinued) 4 mg Once As Needed 4/30/2019 4/30/2019    Sig - Route: Infuse 2 mL into a venous catheter 1 (One) Time As Needed for Nausea or Vomiting. - Intravenous    Reason for Discontinue: Patient Transfer    Pharmacy Consult (Discontinued)  Continuous PRN 4/30/2019 4/30/2019    Sig - Route: Continuous As Needed for Consult. - Does not apply    Reason for Discontinue: Formulary change    sodium chloride (NS) irrigation solution (Discontinued)  As Needed 4/30/2019 4/30/2019    Sig: As Needed.    Reason for Discontinue: Patient Discharge    sodium chloride 0.9 % flush 1-10 mL (Discontinued) 1-10 mL As Needed 4/30/2019 4/30/2019    Sig - Route: Infuse 1-10 mL into a venous catheter As Needed for Line Care. - Intravenous    Reason for Discontinue: Patient Transfer    sodium chloride 0.9 % flush 3 mL (Discontinued) 3 mL Every 12 Hours Scheduled 4/30/2019 4/30/2019    Sig - Route: Infuse 3 mL into a venous catheter Every 12 (Twelve) Hours. - Intravenous    Reason for Discontinue: Patient Transfer    sodium chloride 0.9 % infusion 40 mL (Discontinued) 40 mL As Needed 4/30/2019 4/30/2019    Sig - Route: Infuse 40 mL into a venous catheter As Needed for Line Care. - Intravenous    Reason for Discontinue: Patient Transfer    sodium chloride 0.9 % solution (Discontinued)  As Needed 4/30/2019 4/30/2019    Sig: As Needed.    Reason for Discontinue: Patient Discharge    sodium chloride 60 mL, bupivacaine PF 60 mL, bupivacaine liposome 20 mL mixture (Discontinued)  As Needed 4/30/2019 4/30/2019     Sig: As Needed.    Reason for Discontinue: Patient Discharge    sterile water irrigation solution (Discontinued)  As Needed 4/30/2019 4/30/2019    Sig: As Needed.    Reason for Discontinue: Patient Discharge                    Lab Results (last 24 hours)     Procedure Component Value Units Date/Time    aPTT [[id number]]  (Normal) Collected:  05/01/19 0352    Specimen:  Blood Updated:  05/01/19 0529     PTT 28.7 seconds     Narrative:       PTT = The equivalent PTT values for the therapeutic range of heparin levels at 0.1 to 0.7 U/ml are 53 to 110 seconds.    CBC & Differential [[id number]] Collected:  05/01/19 0352    Specimen:  Blood Updated:  05/01/19 0431    Narrative:       The following orders were created for panel order CBC & Differential.  Procedure                               Abnormality         Status                     ---------                               -----------         ------                     CBC Auto Differential[[id number]]        Abnormal            Final result                 Please view results for these tests on the individual orders.    CBC Auto Differential [[id number]]  (Abnormal) Collected:  05/01/19 0352    Specimen:  Blood Updated:  05/01/19 0431     WBC 11.42 10*3/mm3      RBC 3.39 10*6/mm3      Hemoglobin 11.3 g/dL      Hematocrit 34.9 %      .9 fL      MCH 33.3 pg      MCHC 32.4 g/dL      RDW 13.2 %      RDW-SD 50.3 fl      MPV 10.0 fL      Platelets 209 10*3/mm3      Neutrophil % 85.0 %      Lymphocyte % 9.7 %      Monocyte % 4.7 %      Eosinophil % 0.1 %      Basophil % 0.1 %      Immature Grans % 0.4 %      Neutrophils, Absolute 9.70 10*3/mm3      Lymphocytes, Absolute 1.11 10*3/mm3      Monocytes, Absolute 0.54 10*3/mm3      Eosinophils, Absolute 0.01 10*3/mm3      Basophils, Absolute 0.01 10*3/mm3      Immature Grans, Absolute 0.05 10*3/mm3      nRBC 0.0 /100 WBC         Imaging Results (last 24 hours)     Procedure Component Value Units Date/Time    XR Knee 1 or 2  View Right [[id number]] Collected:  04/30/19 1525     Updated:  04/30/19 1529    Narrative:       XR KNEE 1 OR 2 VW RIGHT-: 4/30/2019 3:05 PM     INDICATION:   Status post total knee replacement..     COMPARISON:   01/17/2019.     FINDINGS:  2 view(s) of the right knee.  Status post total right knee replacement.  Surgical hardware appears to be in satisfactory position. Expected  postoperative changes include soft tissue swelling and subcutaneous  emphysema. Radiopaque drain noted. Both of the views are degraded by  overlying support equipment artifact.. No bone erosion or destruction.   No foreign body.       Impression:       Satisfactory postoperative appearance of the right knee..     This report was finalized on 4/30/2019 3:27 PM by Dr. Holden Ortiz MD.           ECG/EMG Results (last 24 hours)     ** No results found for the last 24 hours. **           Operative/Procedure Notes (last 24 hours) (Notes from 4/30/2019 12:53 PM through 5/1/2019 12:53 PM)      Bob Curry MD at 4/30/2019  1:30 PM        Preoperative Diagnosis: End-stage osteoarthritis right knee with varus deformity    Postoperative Diagnosis: Same    Procedure Performed: Right total knee arthroplasty [place] press-fit #5 cruciate retaining femoral component 5 tibial tray with a 9 mm CS insert using the Ortho align navigational system    Surgeon: Patricio      Assistant: Jack Garcia    Anesthesia: Spinal abductor canal block      Anesthetist: Caleb Navarrete    Tourniquet time 56 minutes      Estimated blood loss 50 cc        Drains: X1    Complications: None        Indications for procedure: 48-year-old male with end-stage degenerative arthritis of the right knee with varus deformity that failed to respond to nonoperative management          Operative Note: Patient brought to the operating room and after satisfactory anesthesia was obtained he pneumonic tourniquet placed on the right upper leg.  Timeout completed identifying the patient procedure  correctly.  The right leg was then prepped and after the prep dry for 3 minutes was draped in sterile from usual manner with timeout once again being completed.  The leg was then exsanguinated tourniquet elevated to 250.  A midline incision was then made through the subcutaneous tissue and the wound was irrigated with irresept solution for 1 minute.  Dissection was then carried down until the medial arthrotomy incision was carried out the infrapatellar fat-pad supracondylar fat pad intercritical excised.  The joint was then irrigated once again with the solution for 1 to 2 minutes.  Attention directed towards the femoral side with a femoral Ortho align navigational system of the proximal of the distal femur and after the appropriate navigational maneuvers the distal femoral cut was completed.  Patient templated for 5 patella component to 5 check a placed anterior posterior Cuts were completed.  Abduction to 5 femur showed excellent bone prosthesis contact.  Tibial Orth align navigational system was then applied to the proximal tibia and after doing the appropriate navigational maneuvers the proximal tibia cut was completed.  Gap balancing test that showed there was still tight medially and the medial release was carried out off of the tibia removed medial osteophytes and the gap balancing at 98 0 degrees was then symmetrical.  Remainder the menisci removed posteriorly and the posterior cruciate noted to be intact.  Trial reduction then with the 5 tray the diet insert showed excellent bone prosthesis contact in anatomic alignment of the leg and no evidence of flexion instability.  The patella did show some changes but it was not resurfaced but denervated.  Osteophytes were removed.  All trial components were then removed.  Drill holes were made in the femur and the keel cut and the 4 drill holes with a press-fit tibia and then carried out.  40 cc of Exparel was injected into the posterior capsule.  The tibial tray  was then press-fit into place and a 9 mm insert placed over the tibial tray and then the #5 femoral component was press-fit into place knee about the full extension axially loaded noted to be stable throughout the arc of motion with no instability at 0 90 degrees.  Tourniquet was released hemostasis obtained with the cautery.  The knee was irrigated once again with the irresept solution.  Hemovac was placed in the deep recesses of the wound.  The arthrotomy was closed with corner stitches of #2 Vicryl then a running stitch of #1 strata fix.  Subcuticular closure was carried out with 0 strata fix and then 3-0 strata fix.  A prineo Dermabond dressing was applied.  Sterile dressings then applied patient transferred recovery patella procedure well all counts were correct            Dictated utilizing Dragon dictation     Electronically signed by Bob Curry MD at 4/30/2019  2:49 PM          Physician Progress Notes (last 24 hours) (Notes from 4/30/2019 12:53 PM through 5/1/2019 12:53 PM)      Bob Curry MD at 5/1/2019  7:05 AM          Orthopedic Progress Note   Chief Complaint: Status post right total knee    Subjective     Interval History: Patient postop day 1.  He is afebrile hemodynamically stable hemoglobin of 11.3.  Has had 140 cc a Hemovac drainage.  Pain controlled with oral Tylenol           Objective     Vital Signs  Temp:  [96.2 °F (35.7 °C)-98.2 °F (36.8 °C)] 97.9 °F (36.6 °C)  Heart Rate:  [43-63] 59  Resp:  [12-18] 16  BP: ()/(50-82) 91/50  Body mass index is 31.53 kg/m².    Intake/Output Summary (Last 24 hours) at 5/1/2019 0706  Last data filed at 5/1/2019 0305  Gross per 24 hour   Intake 1280 ml   Output 1590 ml   Net -310 ml     No intake/output data recorded.       Physical Exam:   General: patient awake, alert and cooperative   Cardiovascular: regular rhythm and rate   Pulm: clear to auscultation bilaterally   Abdomen: Benign.  Soft bowel sounds   Extremities:Right leg is good distal  pulses no motor or sensory deficit.   Neurologic: Normal mood and behavior     Results Review:     I reviewed the patient's new clinical results.      WBC No results found for: WBCS   HGB Hemoglobin   Date Value Ref Range Status   05/01/2019 11.3 (L) 13.0 - 17.7 g/dL Final      HCT Hematocrit   Date Value Ref Range Status   05/01/2019 34.9 (L) 37.5 - 51.0 % Final      Platlets No results found for: LABPLAT     PT/INR:  No results found for: PROTIME/No results found for: INR    Sodium No results found for: NA   Potassium No results found for: K   Chloride No results found for: CL   Bicarbonate No results found for: PLASMABICARB   BUN No results found for: BUN   Creatinine No results found for: CREATININE   Calcium No results found for: CALCIUM   Magnesium  AST  ALT  Bilirubin, Total  AlkPhos  Albumin    Amylase  Lipase    Radiology: No results found for: MG  No components found for: AST.*  No components found for: ALT.*  No components found for: BILIRUBIN, TOTAL.*    No components found for: ALKPHOS.*  No components found for: ALBUMIN.*      No components found for: AMYLASE.*  No components found for: LIPASE.*            Imaging Results (most recent)     Procedure Component Value Units Date/Time    XR Knee 1 or 2 View Right [[id number]] Collected:  04/30/19 1525     Updated:  04/30/19 1529    Narrative:       XR KNEE 1 OR 2 VW RIGHT-: 4/30/2019 3:05 PM     INDICATION:   Status post total knee replacement..     COMPARISON:   01/17/2019.     FINDINGS:  2 view(s) of the right knee.  Status post total right knee replacement.  Surgical hardware appears to be in satisfactory position. Expected  postoperative changes include soft tissue swelling and subcutaneous  emphysema. Radiopaque drain noted. Both of the views are degraded by  overlying support equipment artifact.. No bone erosion or destruction.   No foreign body.       Impression:       Satisfactory postoperative appearance of the right knee..     This report was  "finalized on 4/30/2019 3:27 PM by Dr. Holden Ortiz MD.                  lactated ringers 9 mL/hr Last Rate: Stopped (04/30/19 1511)   lactated ringers 100 mL/hr Last Rate: Stopped (04/30/19 1753)   lactated ringers 20 mL/hr Last Rate: Stopped (04/30/19 1753)         Assessment/Plan     Patient Active Problem List   Diagnosis Code   • Primary osteoarthritis of right knee M17.11   • Osteoarthritis of right knee M17.11         DC Hemovac drainage.  Continue PT OT.  Home probably tomorrow    Bob Curry MD  05/01/19  7:06 AM          Dictated utilizing Dragon dictation     Electronically signed by Bob Curry MD at 5/1/2019  7:07 AM          Consult Notes (last 24 hours) (Notes from 4/30/2019 12:53 PM through 5/1/2019 12:53 PM)      Fareed Ramires MD at 4/30/2019  6:02 PM          [facility] HOSPITALIST     Nila Anne APRN    Reason for Consult: Medical Management of Hypothyroidism, CAD, and Hep C    HISTORY OF PRESENT ILLNESS:    Mr. Sharpe is a pleasant, 47 y/o  male who underwent successful right TKA today due to end-stage degenerative arthritis.  He has had two previous bilateral ACL repairs in the 90's and began having worsening left knee pain that was shouldered on the right leg.  Conservative measures have failed to relieve his pain.  Injections failed to relieve the pain.  He denies exertional chest pain and dyspnea.  He has been off his Plavix for 5-days.  Denies N/V/D.  He has been \"clean\" for a number of years and is trying hard to get his life back in order after a history of narcotic abuse.      Past Medical History:   Diagnosis Date   • Arthritis    • Chest pain     states has been told d/t anxiety   • Coronary artery disease 09/2017    s/p stents    • Frequent urination at night    • Hepatitis C    • History of bleeding ulcers 2012   • History of GI bleed 2012   • History of MI (myocardial infarction) 09/2017    posterior, Dr Napier follows   • History of " palpitations     states has pain w/, cardiologist aware   • History of transfusion    • Hyperlipidemia    • Hypothyroidism    • Ischemic cardiomyopathy    • Left knee DJD    • Neuropathy of both feet    • NSVT (nonsustained ventricular tachycardia) (CMS/HCC)     h/o of after MI 2017   • Osteomyelitis of right foot (CMS/HCC)     h/o   • Right knee DJD     sched TKA   • Sleep apnea     no machine     Past Surgical History:   Procedure Laterality Date   • CARDIAC CATHETERIZATION  09/2017   • CORONARY ANGIOPLASTY WITH STENT PLACEMENT  09/2017   • JOINT REPLACEMENT      RTK   • KNEE ACL RECONSTRUCTION Left 1992   • KNEE SURGERY Right     tendon rupture and repair/replace   • METATARSAL OSTEOTOMY Right 02/2019    2nd   • STOMACH SURGERY      d/t bleeding ulcer     Family History   Problem Relation Age of Onset   • Lung cancer Mother    • Alcohol abuse Father    • Seizures Father    • Diabetes Maternal Aunt    • Diabetes Maternal Uncle    • Alcohol abuse Paternal Aunt    • Alcohol abuse Paternal Uncle    • Heart disease Maternal Grandmother    • Alcohol abuse Paternal Grandmother    • Cirrhosis Paternal Grandmother    • Hypertension Paternal Grandfather    • Malig Hyperthermia Neg Hx      Social History     Tobacco Use   • Smoking status: Current Every Day Smoker     Packs/day: 1.00     Years: 35.00     Pack years: 35.00     Types: Cigarettes   • Smokeless tobacco: Former User   Substance Use Topics   • Alcohol use: No     Frequency: Never     Comment: h/o stopped in 2000   • Drug use: Yes     Types: Methamphetamines, Oxycodone     Comment: history of, last 12/ 2017     Medications Prior to Admission   Medication Sig Dispense Refill Last Dose   • aspirin 81 MG EC tablet Take 81 mg by mouth Daily.   4/25/2019   • buprenorphine-naloxone (SUBOXONE) 8-2 MG per SL tablet Place 0.5 tablets under the tongue 2 (Two) Times a Day. Takes half in morning and half in afternoon   4/30/2019 at 0900   • busPIRone (BUSPAR) 10 MG tablet  Take 10 mg by mouth 3 (Three) Times a Day As Needed (anxiety).   4/30/2019 at 9000   • Cholecalciferol (VITAMIN D PO) Take 1 tablet by mouth Daily.   4/25/2019   • clopidogrel (PLAVIX) 75 MG tablet Take 75 mg by mouth Daily.   4/25/2019   • Coenzyme Q10 (COQ10 PO) Take 1 tablet by mouth Daily.   4/25/2019   • cyanocobalamin (CYANOCOBALAMIN) 100 MCG tablet tablet Take 1,000 mcg by mouth Daily.   4/25/2019   • DULoxetine (CYMBALTA) 60 MG capsule Take 60 mg by mouth Daily.   4/30/2019 at 0900   • gabapentin (NEURONTIN) 800 MG tablet Take 800 mg by mouth 3 (Three) Times a Day. One tablet twice a day and 2 tablets at bedtime   4/30/2019 at 0800   • levothyroxine (SYNTHROID, LEVOTHROID) 25 MCG tablet Take 25 mcg by mouth Daily.   4/28/2019 at 0800   • lidocaine-prilocaine (EMLA) 2.5-2.5 % cream Apply 1 application topically to the appropriate area as directed 3 (Three) Times a Day As Needed (neuropathy).   4/25/2019   • Multiple Vitamins-Minerals (MULTIVITAMIN WITH MINERALS) tablet tablet Take 1 tablet by mouth Daily.   4/25/2019   • mupirocin (BACTROBAN) 2 % ointment 1 application into the nostril(s) as directed by provider 3 (Three) Times a Day. 5 days prior to surgery. 22 g 0 4/30/2019 at 0900   • albuterol (PROVENTIL) (2.5 MG/3ML) 0.083% nebulizer solution Take  by nebulization Every 6 (Six) Hours As Needed for Wheezing or Shortness of Air.   More than a month at Unknown time     Allergies:  Nsaids and Celebrex [celecoxib]    REVIEW OF SYSTEMS:  Please see the above history of present illness for pertinent positives and negatives.  The remainder of the patient's systems have been reviewed and are negative.    Vital Signs  Temp:  [96.2 °F (35.7 °C)-98.1 °F (36.7 °C)] 97.4 °F (36.3 °C)  Heart Rate:  [43-61] 61  Resp:  [12-16] 16  BP: ()/(62-82) 114/73  Oxygen Therapy  SpO2: 94 %  Pulse Oximetry Type: Continuous  Device (Oxygen Therapy): nasal cannula  Flow (L/min): 2  ETCO2 (mmHg): 50 mmHg  $ ETCO2 Daily  "Assessment (RT Only): yes}  Body mass index is 31.53 kg/m².     Flowsheet Rows      First Filed Value   Admission Height  176.5 cm (69.5\") Documented at 04/30/2019 0948   Admission Weight  98.2 kg (216 lb 9.6 oz) Documented at 04/30/2019 0948           Physical Exam:  Physical Exam   Constitutional: Patient appears well-developed and well-nourished and in no acute distress   HEENT:   Head: Normocephalic and atraumatic.   Eyes:  Pupils are equal, round, and reactive to light. EOM are intact. Sclerae are anicteric and noninjected.  Mouth and Throat: Patient has moist mucous membranes. Oropharynx is clear of any erythema or exudate.     Neck: Neck supple. No JVD present. No thyromegaly present. No lymphadenopathy present.  Cardiovascular: Regular rate, regular rhythm, S1 normal and S2 normal.  Exam reveals no gallop and no friction rub.  No murmur heard.  Pulmonary/Chest: Lungs are clear to auscultation bilaterally. No respiratory distress. No wheezes. No rhonchi. No rales.   Abdominal: Soft. Bowel sounds are normal. No distension and no mass. There is no tenderness.   Musculoskeletal: Normal muscle tone  Extremities: No edema. Pulses are palpable in all 4 extremities.  Neurological: Cranial nerves II-XII are grossly intact with no focal deficits.  Skin: No cyanosis or erythema.  Psych: AAOx3.  Normal affect, insight, and judgement.    Emotional Behavior: As above     Debilities: None     Results Review:    I reviewed the patient's new clinical results.  Lab Results (most recent)     None          Imaging Results (most recent)     Procedure Component Value Units Date/Time    XR Knee 1 or 2 View Right [[id number]] Collected:  04/30/19 1525     Updated:  04/30/19 1529    Narrative:       XR KNEE 1 OR 2 VW RIGHT-: 4/30/2019 3:05 PM     INDICATION:   Status post total knee replacement..     COMPARISON:   01/17/2019.     FINDINGS:  2 view(s) of the right knee.  Status post total right knee replacement.  Surgical hardware " appears to be in satisfactory position. Expected  postoperative changes include soft tissue swelling and subcutaneous  emphysema. Radiopaque drain noted. Both of the views are degraded by  overlying support equipment artifact.. No bone erosion or destruction.   No foreign body.       Impression:       Satisfactory postoperative appearance of the right knee..     This report was finalized on 4/30/2019 3:27 PM by Dr. Holden Ortiz MD.               ECG/EMG Results (most recent)     None          Impression/Recommendations:    1.  Ischemic Cardiomyopathy: Last EF by Dr. Napier's note was 45-50%.  Resume Plavix when able.  Not on an ACEI or Betablocker due to hypotension.    2.  CAD: MEKA to RCA in September of 2017.  Known LAD and Circ disease.  Statin?  Not on recent clinic note.  Will ask.    3.  Hypothyroidism: Continue current replacement dose.    4.  Tobacco abuse: Make Nicoderm available prn.    5.  Neuropathy: Continue Gabapentin.    6.  Hx/O Narcotic abuse: as per pharmacy consult.    I discussed the patients findings and my recommendations with patient.     Fareed Ramires MD  04/30/19  6:03 PM          Electronically signed by Fareed Ramires MD at 4/30/2019  6:31 PM

## 2019-05-01 NOTE — PLAN OF CARE
Problem: Patient Care Overview  Goal: Plan of Care Review   05/01/19 9056   OTHER   Outcome Summary OT evaluation completed. patient performed supine to sit with supervision. Patient sat at EOB and donned socks indepedently and shorts with CGA. Patient performed functional mobility with rolling walker and CGA X 84 ft. Patient reported no concerns with adls for return home. No skilled OT needs at this time.

## 2019-05-01 NOTE — DISCHARGE INSTRUCTIONS
Total Knee Replacement Discharge Instructions:    I. ACTIVITIES:  1. Exercises:  ? Complete exercise program as taught by the hospital physical therapist 2 times per day  ? Exercise program will be advanced by the physical therapist  ? During the day be up ambulating every 2 hours (while awake) for short distances  ? Complete the ankle pump exercises at least 10 times per hour (while awake)  ? Elevate legs most of the day the first week post operatively and thereafter elevate legs when in bed and for at least 30 minutes during the day. Caution must be taken to avoid pillow placement under the bend of the knee as this can lead to flexion contractures of the knee.  ? Use cold packs 20-30 minutes approximately 5 times per day. This should be done before and after completing your exercises and at any time you are experiencing pain/ stiffness in your operative extremity.  ? Apply 6 inch ace wraps to operative extremity from ankle to groin. Please keep in place at all times except when bathing.      2. Activities of Daily Living:  ? No tub baths, hot tubs, or swimming pools for 4 weeks  ? May shower on post-operative day #3- Do not scrub or rub around the incision or mesh. No soap or alcohol to incision, just let water run over wound.         II. RESTRICTIONS  ? Do not cross legs or kneel  ? Your surgeon will discuss with you when you will be able to drive again.  ? Weight bearing as tolerated  ? First week stay inside on even terrain. May go up and down stairs one stair at a time utilizing the hand rail  ? After one week, you may venture outside.     III. PRECAUTIONS:  ? Everyone that comes near you should wash their hands  ? No elective dental, genital-urinary, or colon procedures or surgical procedures for 12 weeks after surgery unless absolutely necessary.  ?  If dental work or surgical procedure is deemed absolutely necessary, you will need to contact your surgeon as you will need to take antibiotics 1 hour prior to  any dental work (including teeth cleanings).  ? Please discuss with your surgeon prophylactic antibiotics as the length of time this intervention will be necessary for you varies with each patient’s health history and situation.  ? Avoid sick people. If you must be around someone who is ill, they should wear a mask.  ? Avoid visits to the Emergency Room or Urgent Care unless you are having a life              threatening event.     IV. INCISION CARE:  ? Wash your hands prior to dressing changes  ? Incision and knee should be covered with an ACE wrap daily for compression. No creams or ointments to the incision  ? Do not touch or pick at the incision  ? Check incision every day and notify surgeon immediately if any of the following signs or symptoms are noted:  o Increase in redness  o Increase in swelling around the incision and of the entire extremity  o Increase in pain  o Drainage oozing from the incision  o Pulling apart of the edges of the incision  o Increase in overall body temperature (greater than 100.5 degrees)  ? You will be given further instructions on removal of the glue and mesh over your incision at your first follow up visit at the office.     V. MEDICATIONS:   1. Anticoagulants: You will be discharged on an anticoagulant, typically either Aspirin or Eliquis. This is a prophylactic medication that helps prevent blood clots during your post-operative period. The type and length of dosage varies based on your individual needs, procedure performed, and surgeon’s preference.  ? While taking the anticoagulant, you should avoid taking any additional aspirin, ibuprofen (Advil or Motrin), Aleve (Naprosyn) or other non-steroidal anti-inflammatory medications.   ? Notify surgeon immediately if any aramis bleeding is noted in the urine, stool, emesis, or from the nose or the incision. Blood in the stool will often appear as black rather than red. Blood in urine may appear as pink. Blood in emesis may appear as  brown/black like coffee grounds.  ? You will need to apply pressure for longer periods of time to any cuts or abrasions to stop bleeding  ? Avoid alcohol while taking anticoagulants    2. Stool Softeners: You will be at greater risk of constipation after surgery due to being less mobile and the pain medications.   ? Take stool softeners as instructed by your surgeon while on pain medications. Over the counter Colace 100 mg 1-2 capsules twice daily.   ? If stools become too loose or too frequent, please decreases the dosage or stop the stool softener.  ? If constipation occurs despite use of stool softeners, you are to continue the stool softeners and add a laxative (Milk of Magnesia 1 ounce daily as needed)  ? Drink plenty of fluids, and eat fruits and vegetables during your recovery time    3. Pain Medications utilized after surgery are narcotics and the law requires that the following information be given to all patients that are prescribed narcotics:  ? CLASSIFICATION: Pain medications are called Opioids and are narcotics  ? LEGALITIES: It is illegal to share narcotics with others and to drive within 24 hours of taking narcotics  ? POTENTIAL SIDE EFFECTS: Potential side effects of opioids include: nausea, vomiting, itching, dizziness, drowsiness, dry mouth, constipation, and difficulty urinating.  ? POTENTIAL ADVERSE EFFECTS:   o Opioid tolerance can develop with use of pain medications and this simply means that it requires more and more of the medication to control pain; however, this is seen more in patients that use opioids for longer periods of time.  o Opioid dependence can develop with use of Opioids and this simply means that to stop the medication can cause withdrawal symptoms; however, this is seen with patients that use Opioids for longer periods of time.  o Opioid addiction can develop with use of Opioids and the incidence of this is very unlikely in patients who take the medications as ordered and  stop the medications as instructed.  o Opioid overdose can be dangerous, but is unlikely when the medication is taken as ordered and stopped when ordered. It is important not to mix opioids with alcohol or with and type of sedative such as Benadryl as this can lead to over sedation and respiratory difficulty.  ? DOSAGE:   o Pain medications will need to be taken consistently for the first week to decrease pain and promote adequate pain relief and participation in physical therapy.  o After the initial surgical pain begins to resolve, you may begin to decrease the pain medication. By the end of 6 weeks, you should be off of pain medications.  o Refills will not be given by the office during evening hours, on weekends, or after 12 weeks post-op.  o To seek refills on pain medications during the initial 6 week post-operative period, you must call the office 48 hours in advance to request the refill. The office will then notify you when to  the prescription. DO NOT wait until you are out of the medication to request a refill.    VI. FOLLOW-UP VISITS:  ? You will need to follow up in the office. Please call  [phone]  to schedule this appointment.  ? You will need to follow up with your primary care physician within 4 weeks.  ? If you have any concerns or suspected complications prior to your follow up visit, please call your surgeons office. Do not wait until your appointment time if you suspect complications. These will need to be addressed in the office promptly.

## 2019-05-01 NOTE — PROGRESS NOTES
"Hospitalist Team      Patient Care Team:  Nila Anne APRN as PCP - General (Family Medicine)        Chief Complaint: Follow-up Ischemic Cardiomyopathy, Hypothyroidism    Subjective    Doing better this morning, but still c/o hamstring pain.  Denies chest pain and dyspnea.  Worked w/ PT this morning.  Tolerating PO.  Currently, visiting w/ .    Objective    Vital Signs  Temp:  [96.2 °F (35.7 °C)-98.2 °F (36.8 °C)] 97.9 °F (36.6 °C)  Heart Rate:  [43-63] 59  Resp:  [12-18] 16  BP: ()/(50-82) 91/50  Oxygen Therapy  SpO2: 95 %  Pulse Oximetry Type: Continuous  Device (Oxygen Therapy): room air  Flow (L/min): 1  ETCO2 (mmHg): 50 mmHg  $ ETCO2 Daily Assessment (RT Only): yes}    Flowsheet Rows      First Filed Value   Admission Height  176.5 cm (69.5\") Documented at 04/30/2019 0948   Admission Weight  98.2 kg (216 lb 9.6 oz) Documented at 04/30/2019 0948          Physical Exam:    General Appearance:    Alert, cooperative, in no acute distress   Lungs:     Clear to auscultation,respirations regular, even and                  unlabored    Heart:    Regular rhythm and normal rate, normal S1 and S2, no            murmur, no gallop, no rub, no click   Abdomen:     Obese, soft and non-tender w/ active bowel sounds   Extremities:   Moves all extremities well, no cyanosis   Pulses:   Radial pulses palpable and equal bilaterally   Neurologic:   Cranial nerves 2 - 12 grossly intact       Results Review:     I reviewed the patient's new clinical results.    Lab Results (last 24 hours)     Procedure Component Value Units Date/Time    aPTT [[id number]]  (Normal) Collected:  05/01/19 0352    Specimen:  Blood Updated:  05/01/19 0529     PTT 28.7 seconds     Narrative:       PTT = The equivalent PTT values for the therapeutic range of heparin levels at 0.1 to 0.7 U/ml are 53 to 110 seconds.    CBC & Differential [[id number]] Collected:  05/01/19 0352    Specimen:  Blood Updated:  05/01/19 0431    Narrative:       The " following orders were created for panel order CBC & Differential.  Procedure                               Abnormality         Status                     ---------                               -----------         ------                     CBC Auto Differential[[id number]]        Abnormal            Final result                 Please view results for these tests on the individual orders.    CBC Auto Differential [[id number]]  (Abnormal) Collected:  05/01/19 0352    Specimen:  Blood Updated:  05/01/19 0431     WBC 11.42 10*3/mm3      RBC 3.39 10*6/mm3      Hemoglobin 11.3 g/dL      Hematocrit 34.9 %      .9 fL      MCH 33.3 pg      MCHC 32.4 g/dL      RDW 13.2 %      RDW-SD 50.3 fl      MPV 10.0 fL      Platelets 209 10*3/mm3      Neutrophil % 85.0 %      Lymphocyte % 9.7 %      Monocyte % 4.7 %      Eosinophil % 0.1 %      Basophil % 0.1 %      Immature Grans % 0.4 %      Neutrophils, Absolute 9.70 10*3/mm3      Lymphocytes, Absolute 1.11 10*3/mm3      Monocytes, Absolute 0.54 10*3/mm3      Eosinophils, Absolute 0.01 10*3/mm3      Basophils, Absolute 0.01 10*3/mm3      Immature Grans, Absolute 0.05 10*3/mm3      nRBC 0.0 /100 WBC           Imaging Results (last 24 hours)     Procedure Component Value Units Date/Time    XR Knee 1 or 2 View Right [[id number]] Collected:  04/30/19 1525     Updated:  04/30/19 1529    Narrative:       XR KNEE 1 OR 2 VW RIGHT-: 4/30/2019 3:05 PM     INDICATION:   Status post total knee replacement..     COMPARISON:   01/17/2019.     FINDINGS:  2 view(s) of the right knee.  Status post total right knee replacement.  Surgical hardware appears to be in satisfactory position. Expected  postoperative changes include soft tissue swelling and subcutaneous  emphysema. Radiopaque drain noted. Both of the views are degraded by  overlying support equipment artifact.. No bone erosion or destruction.   No foreign body.       Impression:       Satisfactory postoperative appearance of the right  knee..     This report was finalized on 4/30/2019 3:27 PM by Dr. Holden Ortiz MD.               Medication Review:   I have reviewed the patient's current medication list    Current Facility-Administered Medications:   •  acetaminophen (TYLENOL) tablet 1,000 mg, 1,000 mg, Oral, Q6H, Bob Curry MD, 1,000 mg at 05/01/19 0533  •  albuterol (PROVENTIL) nebulizer solution 0.083% 2.5 mg/3mL, 2.5 mg, Nebulization, Q6H PRN, Bob Curry MD  •  buprenorphine-naloxone (SUBOXONE) 8-2 MG film 0.5 film, 0.5 film, Sublingual, BID, Bob Curry MD, 0.5 film at 05/01/19 0857  •  busPIRone (BUSPAR) tablet 10 mg, 10 mg, Oral, TID PRN, Bob Curry MD  •  clopidogrel (PLAVIX) tablet 75 mg, 75 mg, Oral, Daily, Bob Curry MD, 75 mg at 05/01/19 0832  •  DULoxetine (CYMBALTA) DR capsule 60 mg, 60 mg, Oral, Daily, Bob Curry MD, 60 mg at 05/01/19 0832  •  gabapentin (NEURONTIN) capsule 800 mg, 800 mg, Oral, 4x Daily, Bob Curry MD, 800 mg at 05/01/19 0832  •  lactated ringers infusion, 9 mL/hr, Intravenous, Continuous, Marko Pappas CRNA, Stopped at 04/30/19 1511  •  lactated ringers infusion, 100 mL/hr, Intravenous, Continuous, Caleb Cheung CRNA, Stopped at 04/30/19 1753  •  lactated ringers infusion, 20 mL/hr, Intravenous, Continuous, Bob Curry MD, Stopped at 04/30/19 1753  •  levothyroxine (SYNTHROID, LEVOTHROID) tablet 25 mcg, 25 mcg, Oral, Daily, Bob Curry MD, 25 mcg at 05/01/19 0832  •  nicotine (NICODERM CQ) 14 MG/24HR patch 1 patch, 1 patch, Transdermal, Daily PRN, Fareed Ramires MD, 1 patch at 04/30/19 2102  •  ondansetron (ZOFRAN) tablet 4 mg, 4 mg, Oral, Q6H PRN **OR** ondansetron (ZOFRAN) injection 4 mg, 4 mg, Intravenous, Q6H PRN, Bob Curry MD  •  rivaroxaban (XARELTO) tablet 10 mg, 10 mg, Oral, Daily, Bob Curry MD, 10 mg at 05/01/19 0832  •  vitamin B-12 (CYANOCOBALAMIN) tablet 1,000 mcg, 1,000 mcg, Oral, Daily, Bob Curry MD, 1,000 mcg at 05/01/19  "0833      Impression/Recommendations:    1.  Ischemic Cardiomyopathy: Last EF by Dr. Napier's note was 45-50%.  Resume Plavix when able.       2.  CAD: MEKA to RCA in September of 2017.  Known LAD and Circ disease.  Quit taking statin due to \"bone\" pain.    3.  Hypothyroidism: Nothing acute.  No change to current replacement dose.     4.  Tobacco abuse: prn Nicoderm.     5.  Neuropathy: Nothing acute.  Continue Gabapentin.     6.  Hx/O Narcotic abuse: No acute issues.    Plan for disposition: As per Ortho    Fareed Ramires MD  05/01/19  11:42 AM        "

## 2019-05-01 NOTE — THERAPY EVALUATION
Acute Care - Physical Therapy Initial Evaluation   Nellie Weinstein     Patient Name: Gasper Greene  : 1970  MRN: [id number]  Today's Date: 2019   Onset of Illness/Injury or Date of Surgery: 19  Date of Referral to PT: 19  Referring Physician: Dr. Curry       Admit Date: 2019    Visit Dx:     ICD-10-CM ICD-9-CM   1. Primary osteoarthritis of right knee M17.11 715.16     Patient Active Problem List   Diagnosis   • Primary osteoarthritis of right knee   • Osteoarthritis of right knee     Past Medical History:   Diagnosis Date   • Arthritis    • Chest pain     states has been told d/t anxiety   • Coronary artery disease 2017    s/p stents    • Frequent urination at night    • Hepatitis C    • History of bleeding ulcers    • History of GI bleed    • History of MI (myocardial infarction) 2017    posterior, Dr Napier follows   • History of palpitations     states has pain w/, cardiologist aware   • History of transfusion    • Hyperlipidemia    • Hypothyroidism    • Ischemic cardiomyopathy    • Left knee DJD    • Neuropathy of both feet    • NSVT (nonsustained ventricular tachycardia) (CMS/HCC)     h/o of after MI    • Osteomyelitis of right foot (CMS/HCC)     h/o   • Right knee DJD     sched TKA   • Sleep apnea     no machine     Past Surgical History:   Procedure Laterality Date   • CARDIAC CATHETERIZATION  2017   • CORONARY ANGIOPLASTY WITH STENT PLACEMENT  2017   • JOINT REPLACEMENT      RTK   • KNEE ACL RECONSTRUCTION Left    • KNEE SURGERY Right     tendon rupture and repair/replace   • METATARSAL OSTEOTOMY Right 2019    2nd   • STOMACH SURGERY      d/t bleeding ulcer   • TOTAL KNEE ARTHROPLASTY Right 2019    Procedure: RIGHT TOTAL KNEE ARTHROPLASTY-Babak, Orth align;  Surgeon: Bob Curry MD;  Location: [facility];  Service: Orthopedics        PT ASSESSMENT (last 12 hours)      Physical Therapy Evaluation     Row Name 19 0850          PT  Evaluation Time/Intention    Subjective Information  complains of;pain  -BP     Document Type  evaluation  -BP     Mode of Treatment  physical therapy  -BP     Patient Effort  good  -BP     Symptoms Noted During/After Treatment  increased pain  -BP     Row Name 05/01/19 0850          General Information    Patient Profile Reviewed?  yes  -BP     Onset of Illness/Injury or Date of Surgery  04/30/19  -BP     Referring Physician  Dr. Curry   -BP     Patient Observations  alert;cooperative;agree to therapy  -BP     Patient/Family Observations  Patient supine in bed with HOB elevated. Patient agreeable to PT. Patient pillow under knee with knee noted to be in flexion.   -BP     Prior Level of Function  independent:;gait;transfer;all household mobility;community mobility;home management See pertinent history   -BP     Equipment Currently Used at Home  cane, straight owns [employer] did not use   -BP     Pertinent History of Current Functional Problem  Patient admitted s/p R TKA. Patient with chronic and worsening knee OA in B knees. Patient reports at baseline gait distance is limted due to B knee pain. He intermittently uses a straight cane for mobility. Patient reports at baseline R knee could not extend fully. Patient with severe L knee varus deformity  -BP     Existing Precautions/Restrictions  fall knee immobilizer not needed   -BP     Risks Reviewed  patient:;LOB;increased discomfort  -BP     Benefits Reviewed  patient:;improve function;increase independence;increase strength  -BP     Barriers to Rehab  previous functional deficit  -BP     Row Name 05/01/19 0850          Relationship/Environment    Lives With  other relative(s)  -BP     Name(s) of Who Lives With Patient  patient lives with his aunt   -BP     Row Name 05/01/19 0850          Resource/Environmental Concerns    Current Living Arrangements  home/apartment/condo  -BP     Row Name 05/01/19 0850          Home Main Entrance    Number of Stairs, Main Entrance  five   -BP     Stair Railings, Main Entrance  -- one hand rail  -BP     Row Name 05/01/19 0850          Cognitive Assessment/Interventions    Additional Documentation  Cognitive Assessment/Intervention (Group)  -BP     Row Name 05/01/19 0850          Cognitive Assessment/Intervention- PT/OT    Orientation Status (Cognition)  oriented x 4  -BP     Follows Commands (Cognition)  WNL  -BP     Safety Deficit (Cognitive)  impulsivity  -BP     Personal Safety Interventions  gait belt;nonskid shoes/slippers when out of bed  -BP     Row Name 05/01/19 0850          Safety Issues, Functional Mobility    Safety Issues Affecting Function (Mobility)  impulsivity;judgment;positioning of assistive device  -BP     Comment, Safety Issues/Impairments (Mobility)  Cues to manage device safely   -BP     Row Name 05/01/19 0850          Bed Mobility Assessment/Treatment    Bed Mobility Assessment/Treatment  supine-sit  -BP     Supine-Sit [place] (Bed Mobility)  supervision  -BP     Assistive Device (Bed Mobility)  head of bed elevated  -BP     Row Name 05/01/19 0850          Transfer Assessment/Treatment    Transfer Assessment/Treatment  stand-sit transfer;sit-stand transfer  -BP     Comment (Transfers)  Verbal cues for hand placement required. Patient impulsively stands without sock on or device.   -BP     Sit-Stand [place] (Transfers)  contact guard;verbal cues  -BP     Stand-Sit [place] (Transfers)  contact guard;verbal cues  -BP     Row Name 05/01/19 0850          Sit-Stand Transfer    Assistive Device (Sit-Stand Transfers)  walker, front-wheeled  -BP     Row Name 05/01/19 0850          Stand-Sit Transfer    Assistive Device (Stand-Sit Transfers)  walker, front-wheeled  -BP     Row Name 05/01/19 0850          Gait/Stairs Assessment/Training    [place] Level (Gait)  contact guard;verbal cues  -BP     Assistive Device (Gait)  walker, front-wheeled  -BP     Distance in Feet (Gait)  84  -BP     Pattern (Gait)  swing-through   -BP     Deviations/Abnormal Patterns (Gait)  base of support, wide;gait speed decreased;stride length decreased  -     Bilateral Gait Deviations  forward flexed posture  -     Comment (Gait/Stairs)  Patient severe L knee varus deformity noted, limits patient at baseline. Patient unable to continue gait training due to L knee.   -     Row Name 05/01/19 0850          General ROM    GENERAL ROM COMMENTS  L knee AROM WFL. Noted L knee varus with LAQ. R ankle AROM WFL. Patient lacks full R knee extension, not formerly assessed.   -     Row Name 05/01/19 0850          MMT (Manual Muscle Testing)    General MMT Comments  L knee gross MMT 4+/5. R LE gross MMT 4-/5.   -     Row Name 05/01/19 0850          Motor Assessment/Intervention    Additional Documentation  Therapeutic Exercise (Group)  -     Row Name 05/01/19 0850          Therapeutic Exercise    Therapeutic Exercise  seated, lower extremities;supine, lower extremities  -     Additional Documentation  Therapeutic Exercise (Row)  -     Row Name 05/01/19 0850          Lower Extremity Supine Therapeutic Exercise    Performed, Supine Lower Extremity (Therapeutic Exercise)  hip abduction/adduction;SAQ (short arc quad) over bolster;SLR (straight leg raise);quadriceps sets;hamstring sets;ankle pumps;heel slides  -     Sets/Reps Detail, Supine Lower Extremity (Therapeutic Exercise)  1/10-R LE   -BP     [facility] Name 05/01/19 0850          Sensory Assessment/Intervention    Sensory General Assessment  -- Patient denies numbness and tingling B LE's   -[facility] [place] Name 05/01/19 0850          Pain Assessment    Additional Documentation  Pain Scale: Numbers Pre/Post-Treatment (Group)  -     Row Name 05/01/19 0850          Pain Scale: Numbers Pre/Post-Treatment    Pain Scale: Numbers, Pretreatment  5/10  -BP     Pain Scale: Numbers, Post-Treatment  -- Increased with gait and mobility, does not rate   -BP     Pain Location - Side  Right  -BP     Pain Location -  Orientation  incisional  -BP     Pain Location  knee  -BP     Pre/Post Treatment Pain Comment  patient complains of R hamstring pain.   -BP     Row Name             Wound 04/30/19 1408 Right knee incision    Wound - Properties Group Date first assessed: 04/30/19  -JM Time first assessed: 1408  -JM Side: Right  -JM Location: knee  -JM Type: incision  -JM    Row Name 05/01/19 0850          Plan of Care Review    Plan of Care Reviewed With  patient  -BP     Row Name 05/01/19 0850          Physical Therapy Clinical Impression    Date of Referral to PT  04/30/19  -BP     PT Diagnosis (PT Clinical Impression)  Decreased functional mobility   -BP     Criteria for Skilled Interventions Met (PT Clinical Impression)  yes;treatment indicated  -BP     Predicted Duration of Therapy (PT)  2 days   -BP     Care Plan Review (PT)  evaluation/treatment results reviewed;care plan/treatment goals reviewed;risks/benefits reviewed  -BP     Patient/Family Concerns, Anticipated Discharge Disposition (PT)  If transport is available.   -BP     Row Name 05/01/19 0850          Physical Therapy Goals    Bed Mobility Goal Selection (PT)  bed mobility, PT goal 1  -BP     Transfer Goal Selection (PT)  transfer, PT goal 1  -BP     Gait Training Goal Selection (PT)  gait training, PT goal 1  -BP     Stairs Goal Selection (PT)  stairs, PT goal 1  -BP     Additional Documentation  Stairs Goal Selection (PT) (Row)  -BP     Row Name 05/01/19 0850          Bed Mobility Goal 1 (PT)    Activity/Assistive Device (Bed Mobility Goal 1, PT)  bed mobility activities, all  -BP     [place] Level/Cues Needed (Bed Mobility Goal 1, PT)  conditional independence  -BP     Time Frame (Bed Mobility Goal 1, PT)  2 days  -BP     Progress/Outcomes (Bed Mobility Goal 1, PT)  goal ongoing  -BP     Row Name 05/01/19 0850          Transfer Goal 1 (PT)    Activity/Assistive Device (Transfer Goal 1, PT)  sit-to-stand/stand-to-sit  -BP     [place]/Cues Needed  (Transfer Goal 1, PT)  supervision required  -BP     Time Frame (Transfer Goal 1, PT)  2 days  -BP     Progress/Outcome (Transfer Goal 1, PT)  goal ongoing  -BP     Row Name 05/01/19 0850          Gait Training Goal 1 (PT)    Activity/Assistive Device (Gait Training Goal 1, PT)  gait (walking locomotion);walker, rolling  -BP     [place] Level (Gait Training Goal 1, PT)  supervision required  -BP     Distance (Gait Goal 1, PT)  50  -BP     Time Frame (Gait Training Goal 1, PT)  2 days  -BP     Barriers (Gait Training Goal 1, PT)  L knee OA and varus deformity  -BP     Progress/Outcome (Gait Training Goal 1, PT)  goal ongoing  -BP     Row Name 05/01/19 0850          Stairs Goal 1 (PT)    Activity/Assistive Device (Stairs Goal 1, PT)  ascending stairs;descending stairs  -BP     [place] Level/Cues Needed (Stairs Goal 1, PT)  contact guard assist  -BP     Number of Stairs (Stairs Goal 1, PT)  5 1 handrail   -BP     Time Frame (Stairs Goal 1, PT)  2 days  -BP     Progress/Outcome (Stairs Goal 1, PT)  goal ongoing  -BP     Row Name 05/01/19 0850          Patient Education Goal (PT)    Activity (Patient Education Goal, PT)  LE HEP   -BP     [place]/Cues/Accuracy (Memory Goal 2, PT)  demonstrates adequately;verbalizes understanding  -BP     Time Frame (Patient Education Goal, PT)  2 days  -BP     Progress/Outcome (Patient Education Goal, PT)  goal ongoing  -BP     Row Name 05/01/19 0850          Positioning and Restraints    Pre-Treatment Position  in bed  -BP     Post Treatment Position  chair  -BP     In Chair  notified nsg;reclined;call light within reach;encouraged to call for assist cold applied   -BP     Row Name 05/01/19 0850          Living Environment    Home Accessibility  stairs to enter home  -BP       User Key  (r) = Recorded By, (t) = Taken By, (c) = Cosigned By    Initials Name Provider Type    Zee Pulido, RN Registered Nurse    BP Dennis Meléndez, PT Physical Therapist          PT  Recommendation and Plan  Anticipated Discharge Disposition (PT): home with OP services  Planned Therapy Interventions (PT Eval): bed mobility training, gait training, home exercise program, patient/family education, stair training, strengthening, transfer training  Therapy Frequency (PT Clinical Impression): 2 times/day  Outcome Summary/Treatment Plan (PT)  Anticipated Equipment Needs at Discharge (PT): front wheeled walker  Anticipated Discharge Disposition (PT): home with OP services  Patient/Family Concerns, Anticipated Discharge Disposition (PT): If transport is available.   Plan of Care Reviewed With: patient  Outcome Summary: PT Evaluation Complete: Patient performs supine to sit transfer with supervision, sit to/from stand transfers with CGA and gait x 84 feet with CGA and use of FWW. Patient requires cues for safety with use of AD. Gait distance limited at baseline and currently due to varus deformity and pain in L knee. Patient would benfit from skilled PT services to address deficits in functional mobility and LE strength/ROM. Plan to see patient 2x/day. Recommend outpatient PT at discharge if transport is available.   Outcome Measures     Row Name 05/01/19 0850             How much help from another person do you currently need...    Turning from your back to your side while in flat bed without using bedrails?  3  -BP      Moving from lying on back to sitting on the side of a flat bed without bedrails?  3  -BP      Moving to and from a bed to a chair (including a wheelchair)?  3  -BP      Standing up from a chair using your arms (e.g., wheelchair, bedside chair)?  3  -BP      Climbing 3-5 steps with a railing?  3  -BP      To walk in hospital room?  3  -BP      AM-PAC 6 Clicks Score  18  -BP         Functional Assessment    Outcome Measure Options  AM-PAC 6 Clicks Basic Mobility (PT)  -BP        User Key  (r) = Recorded By, (t) = Taken By, (c) = Cosigned By    Initials Name Provider Type    BP Medhat  Dennis, PT Physical Therapist         Time Calculation:   PT Charges     Row Name 05/01/19 1104             Time Calculation    Start Time  0850  -BP      PT Received On  05/01/19  -BP      PT - Next Appointment  05/01/19  -BP        User Key  (r) = Recorded By, (t) = Taken By, (c) = Cosigned By    Initials Name Provider Type    BP Dennis Meléndez, PT Physical Therapist        Therapy Charges for Today     Code Description Service Date Service Provider Modifiers Qty    [id number] HC PT EVAL LOW COMPLEXITY 3 5/1/2019 Dennis Meléndez, PT GP 1          PT G-Codes  Outcome Measure Options: AM-PAC 6 Clicks Basic Mobility (PT)  AM-PAC 6 Clicks Score: 18      Dennis Meléndez PT  5/1/2019

## 2019-05-01 NOTE — THERAPY DISCHARGE NOTE
SNF - Occupational Therapy Initial Eval/Discharge   Nellie Weinstein     Patient Name: Gasper Greene  : 1970  MRN: [id number]  Today's Date: 2019  Onset of Illness/Injury or Date of Surgery: 19  Date of Referral to OT: 19  Referring Physician: Dr. Curry      Admit Date: 2019       ICD-10-CM ICD-9-CM   1. Status post total right knee replacement Z96.651 V43.65   2. Primary osteoarthritis of right knee M17.11 715.16     Patient Active Problem List   Diagnosis   • Primary osteoarthritis of right knee   • Osteoarthritis of right knee     Past Medical History:   Diagnosis Date   • Arthritis    • Chest pain     states has been told d/t anxiety   • Coronary artery disease 2017    s/p stents    • Frequent urination at night    • Hepatitis C    • History of bleeding ulcers    • History of GI bleed    • History of MI (myocardial infarction) 2017    posterior, Dr Napier follows   • History of palpitations     states has pain w/, cardiologist aware   • History of transfusion    • Hyperlipidemia    • Hypothyroidism    • Ischemic cardiomyopathy    • Left knee DJD    • Neuropathy of both feet    • NSVT (nonsustained ventricular tachycardia) (CMS/HCC)     h/o of after MI    • Osteomyelitis of right foot (CMS/HCC)     h/o   • Right knee DJD     sched TKA   • Sleep apnea     no machine     Past Surgical History:   Procedure Laterality Date   • CARDIAC CATHETERIZATION  2017   • CORONARY ANGIOPLASTY WITH STENT PLACEMENT  2017   • JOINT REPLACEMENT      RTK   • KNEE ACL RECONSTRUCTION Left    • KNEE SURGERY Right     tendon rupture and repair/replace   • METATARSAL OSTEOTOMY Right 2019    2nd   • STOMACH SURGERY      d/t bleeding ulcer   • TOTAL KNEE ARTHROPLASTY Right 2019    Procedure: RIGHT TOTAL KNEE ARTHROPLASTY-Wright, Orth align;  Surgeon: Bob Curry MD;  Location: [facility];  Service: Orthopedics          OT ASSESSMENT FLOWSHEET (last 12 hours)      Occupational  Therapy Evaluation     Row Name 05/01/19 0851                   OT Evaluation Time/Intention    Subjective Information  complains of;pain  -EN        Document Type  evaluation  -EN        Mode of Treatment  occupational therapy  -EN        Patient Effort  good  -EN        Symptoms Noted During/After Treatment  increased pain  -EN           General Information    Patient Profile Reviewed?  yes  -EN        Onset of Illness/Injury or Date of Surgery  05/30/19  -EN        Referring Physician  Dr. Curry  -EN        Patient Observations  alert;cooperative;agree to therapy  -EN        Patient/Family Observations  Patient reclined in bed, agreeable to OT.    -EN        Prior Level of Function  independent:;all household mobility;ADL's;driving  -EN        Equipment Currently Used at Home  cane, straight;commode, bedside  -EN        Pertinent History of Current Functional Problem  Patient s/p R TKA. At baseline gait distance was limited due to pain.  Reported he occaionally uses a straight cane and was independent with ADLs.  Patient with severe left knee varus deformity and reported he plans to have L TKA in approximately 7 weeks.  -EN        Existing Precautions/Restrictions  fall  -EN        Risks Reviewed  patient:;LOB;increased discomfort  -EN        Benefits Reviewed  patient:;improve function;increase independence  -EN        Barriers to Rehab  previous functional deficit  -EN           Relationship/Environment    Name(s) of Who Lives With Patient  Patient lives with aunt  -EN           Resource/Environmental Concerns    Current Living Arrangements  home/apartment/condo  -EN           Home Main Entrance    Number of Stairs, Main Entrance  five  -EN        Stair Railings, Main Entrance  other (see comments) one handrail (mobile home)  -EN           Cognitive Assessment/Intervention- PT/OT    Orientation Status (Cognition)  oriented x 4  -EN        Follows Commands (Cognition)  WNL  -EN        Safety Deficit (Cognitive)   impulsivity  -EN        Personal Safety Interventions  gait belt;nonskid shoes/slippers when out of bed  -EN           Safety Issues, Functional Mobility    Safety Issues Affecting Function (Mobility)  impulsivity;positioning of assistive device  -EN        Comment, Safety Issues/Impairments (Mobility)  cues to manage AD safely.  -EN           Bed Mobility Assessment/Treatment    Bed Mobility Assessment/Treatment  supine-sit  -EN        Supine-Sit [place] (Bed Mobility)  supervision  -EN        Assistive Device (Bed Mobility)  head of bed elevated  -EN           Transfer Assessment/Treatment    Transfer Assessment/Treatment  stand-sit transfer;sit-stand transfer  -EN        Comment (Transfers)  verbal cues for hand placement.  Impulsively stood from EOB barefoot and without device  -EN           Sit-Stand Transfer    Sit-Stand [place]ott (Transfers)  verbal cues;contact guard  -EN        Assistive Device (Sit-Stand Transfers)  walker, front-wheeled  -EN           Stand-Sit Transfer    Stand-Sit [place]ott (Transfers)  contact guard;verbal cues  -EN        Assistive Device (Stand-Sit Transfers)  walker, front-wheeled  -EN           Lower Body Dressing Assessment/Training    Lower Body Dressing Cadott Level  lower body dressing skills;don;pants/bottoms;socks  -EN        Comment (Lower Body Dressing)  patient donned socks independently, donned shorts with CGA  -EN           General ROM    GENERAL ROM COMMENTS  B UE AROM WFL  -EN           MMT (Manual Muscle Testing)    General MMT Comments  B UE strength WFL  -EN           Positioning and Restraints    Pre-Treatment Position  in bed  -EN        Post Treatment Position  chair  -EN        In Chair  reclined;call light within reach;encouraged to call for assist;with PT  -EN           Pain Scale: Numbers Pre/Post-Treatment    Pain Scale: Numbers, Pretreatment  5/10  -EN        Pain Location - Side  Right  -EN        Pain Location - Orientation  incisional   -EN        Pain Location  knee  -EN        Pre/Post Treatment Pain Comment  c/o right hamstring pain  -EN           Wound 04/30/19 1408 Right knee incision    Wound - Properties Group Date first assessed: 04/30/19  - Time first assessed: 1408  -JM Side: Right  - Location: knee  -JM Type: incision  -JM       Plan of Care Review    Plan of Care Reviewed With  patient  -EN           Clinical Impression (OT)    Date of Referral to OT  05/01/19  -EN        Functional Level at Time of Evaluation (OT Eval)  Patient performed supine to sit with superviison and sit to stand tranfers and mobility with CGA/RW.  Patient donned socks independently and pants with CGA.  Patient reported no concerns regarding adls once discharged.  -EN        Criteria for Skilled Therapeutic Interventions Met (OT Eval)  no problems identified which require skilled intervention  -EN        Therapy Frequency (OT Eval)  evaluation only  -EN           Living Environment    Home Accessibility  stairs to enter home  -EN          User Key  (r) = Recorded By, (t) = Taken By, (c) = Cosigned By    Initials Name Effective Dates    Carissa Manjarrez OTR 06/22/16 -     Zee Pulido RN 06/16/16 -           Occupational Therapy Education     Title: PT OT SLP Therapies (In Progress)     Topic: Occupational Therapy (Resolved)     Point: ADL training (Resolved)     Description: Instruct learner(s) on proper safety adaptation and remediation techniques during self care or transfers.   Instruct in proper use of assistive devices.    Learning Progress Summary           Patient Acceptance, E, VU by EN at 5/1/2019  1:32 PM                               User Key     Initials Effective Dates Name Provider Type Discipline    EN 06/22/16 -  Carissa Kimball OTR Occupational Therapist OT                OT Recommendation and Plan  Therapy Frequency (OT Eval): evaluation only  Plan of Care Review  Plan of Care Reviewed With: patient  Plan of Care Reviewed  With: patient  Outcome Summary: OT evaluation completed.  patient performed supine to sit with supervision.  Patient sat at EOB and donned socks indepedently and shorts with CGA.  Patient performed functional mobility with rolling walker and CGA X 84 ft.  Patient reported no concerns with adls for return home.  No skilled OT needs at this time.     Rehab Goal Summary     Row Name 05/01/19 0850             Physical Therapy Goals    Bed Mobility Goal Selection (PT)  bed mobility, PT goal 1  -BP      Transfer Goal Selection (PT)  transfer, PT goal 1  -BP      Gait Training Goal Selection (PT)  gait training, PT goal 1  -BP      Stairs Goal Selection (PT)  stairs, PT goal 1  -BP         Bed Mobility Goal 1 (PT)    Activity/Assistive Device (Bed Mobility Goal 1, PT)  bed mobility activities, all  -BP      Clint Level/Cues Needed (Bed Mobility Goal 1, PT)  conditional independence  -BP      Time Frame (Bed Mobility Goal 1, PT)  2 days  -BP      Progress/Outcomes (Bed Mobility Goal 1, PT)  goal ongoing  -BP         Transfer Goal 1 (PT)    Activity/Assistive Device (Transfer Goal 1, PT)  sit-to-stand/stand-to-sit  -BP      Clint Level/Cues Needed (Transfer Goal 1, PT)  supervision required  -BP      Time Frame (Transfer Goal 1, PT)  2 days  -BP      Progress/Outcome (Transfer Goal 1, PT)  goal ongoing  -BP         Gait Training Goal 1 (PT)    Activity/Assistive Device (Gait Training Goal 1, PT)  gait (walking locomotion);walker, rolling  -BP      Clint Level (Gait Training Goal 1, PT)  supervision required  -BP      Distance (Gait Goal 1, PT)  50  -BP      Time Frame (Gait Training Goal 1, PT)  2 days  -BP      Barriers (Gait Training Goal 1, PT)  L knee OA and varus deformity  -BP      Progress/Outcome (Gait Training Goal 1, PT)  goal ongoing  -BP         Stairs Goal 1 (PT)    Activity/Assistive Device (Stairs Goal 1, PT)  ascending stairs;descending stairs  -BP      Clint Level/Cues Needed  (Stairs Goal 1, PT)  contact guard assist  -BP      Number of Stairs (Stairs Goal 1, PT)  5 1 handrail   -BP      Time Frame (Stairs Goal 1, PT)  2 days  -BP      Progress/Outcome (Stairs Goal 1, PT)  goal ongoing  -BP         Patient Education Goal (PT)    Activity (Patient Education Goal, PT)  LE HEP   -BP      [place]/Cues/Accuracy (Memory Goal 2, PT)  demonstrates adequately;verbalizes understanding  -BP      Time Frame (Patient Education Goal, PT)  2 days  -BP      Progress/Outcome (Patient Education Goal, PT)  goal ongoing  -BP        User Key  (r) = Recorded By, (t) = Taken By, (c) = Cosigned By    Initials Name Provider Type Discipline    BP Dennis Meléndez, PT Physical Therapist PT          Outcome Measures     Row Name 05/01/19 1300 05/01/19 0851 05/01/19 0850       How much help from another person do you currently need...    Turning from your back to your side while in flat bed without using bedrails?  --  --  3  -BP    Moving from lying on back to sitting on the side of a flat bed without bedrails?  --  --  3  -BP    Moving to and from a bed to a chair (including a wheelchair)?  --  --  3  -BP    Standing up from a chair using your arms (e.g., wheelchair, bedside chair)?  --  --  3  -BP    Climbing 3-5 steps with a railing?  --  --  3  -BP    To walk in hospital room?  --  --  3  -BP    AM-[facility] 6 Clicks Score  --  --  18  -BP       How much help from another is currently needed...    Putting on and taking off regular lower body clothing?  --  3  -EN  --    Bathing (including washing, rinsing, and drying)  --  3  -EN  --    Toileting (which includes using toilet bed pan or urinal)  --  3  -EN  --    Putting on and taking off regular upper body clothing  --  4  -EN  --    Taking care of personal grooming (such as brushing teeth)  --  4  -EN  --    Eating meals  --  4  -EN  --    Score  --  21  -EN  --       Functional Assessment    Outcome Measure Options  AM-PAC 6 Clicks Daily Activity (OT)  -EN   --  AM-PAC 6 Clicks Basic Mobility (PT)  -      User Key  (r) = Recorded By, (t) = Taken By, (c) = Cosigned By    Initials Name Provider Type    Carissa Manjarrez OTR Occupational Therapist    BP Dennis Meléndez, PT Physical Therapist          Time Calculation:   Time Calculation- OT     Row Name 05/01/19 1334             Time Calculation- OT    OT Start Time  0850  -EN        User Key  (r) = Recorded By, (t) = Taken By, (c) = Cosigned By    Initials Name Provider Type    Carissa Manjarrez OTR Occupational Therapist        Therapy Suggested Charges     Code   Minutes Charges    None           Therapy Charges for Today     Code Description Service Date Service Provider Modifiers Qty    [id number] HC OT EVAL LOW COMPLEXITY 2 5/1/2019 Carissa Kimball OTR GO 1                    SHANTE Ch  5/1/2019

## 2019-05-01 NOTE — SIGNIFICANT NOTE
05/01/19 1421   Rehab Treatment   Discipline physical therapist   Reason Treatment Not Performed patient/family declined treatment  (Pt refuses PT this afternoon due to significant pain, rates it an 8/10. Pt sitting in recliner with LE's flexed upon entering room. Pt not agreeable to mobility training or ther ex due to pain. Educated patient on positioning and benefits of mobility. Notified RN. Will check back int he morning. )

## 2019-05-01 NOTE — NURSING NOTE
Discharge Planning Assessment   Nellie Weinstein     Patient Name: Gasper Greene  MRN: [id number]  Today's Date: 5/1/2019    Admit Date: 4/30/2019    Discharge Needs Assessment     Row Name 05/01/19 1610       Living Environment    Lives With  other relative(s)    Name(s) of Who Lives With Patient  Patient lives with his Aunt, Kimmy Mercedes    Current Living Arrangements  home/apartment/condo    Primary Care Provided by  self    Provides Primary Care For  no one    Family Caregiver if Needed  other (see comments)    Family Caregiver Names  Kimmy       Resource/Environmental Concerns    Transportation Concerns  car, none       Transition Planning    Patient/Family Anticipates Transition to  home with family    Transportation Anticipated  family or friend will provide       Discharge Needs Assessment    Readmission Within the Last 30 Days  no previous admission in last 30 days    Concerns to be Addressed  discharge planning    Equipment Currently Used at Home  cane, straight    Equipment Needed After Discharge  walker, rolling    Discharge Facility/Level of Care Needs  home with home health        Discharge Plan     Row Name 05/01/19 1610       Plan    Plan  plan home with home health     Patient/Family in Agreement with Plan  yes    Plan Comments  Spoke with patient at bedside, he is sitting up in recliner. Permission to speak with his aunt, Kimmy Mercedes present. Face sheet verified. The patient lives in a mobile home with is aunt who can assist  when he is dc'd home. He is typically independent of ADLs including driving prior to admission. He has recently been using a cane to assist in ambulation. He states he has a bsc at home. He denies use of home 02, cpap/bipap or additional DME. He has not used home health or inpatient rehab services previously.  He uses [facility] [place] and denies issues obtaining medications. He does not have a living will , but has the form to fill out. Encouraged to provide a copy for  the medical record once completed. The patient requests to do outpatient PT at [facility] if possible but stated the doctor said he could do outpatient or home health. Per PT, he will need a rolling walker. Spoke with Aureliano/Jayson- referral for walker given, anticipate dc tomorrow. Spoke with [facility] - first available appointment is Tuesday 5/7 @ 1pm - will verify with MD if that is acceptable.  Spoke with Kezia NEWBERRY  who clarifies with Dr Curry that patient should be seen prior to Tuesday and  prefers HH. Call placed to [facility] - spoke with Yudy. They cover [place] and accept patients insurance, but PT is booked up and they will not be able to see patient until  end of next week. Spoke with Pr[facility] - referral given. They will be able to see patient on Friday for PT. Clinicals faxed to [phone].  Appt@ [facility] cancelled- spoke with Taylor. Patient updated. Will continue to follow.        Destination      No service coordination in this encounter.      Durable Medical Equipment      No service coordination in this encounter.      Dialysis/Infusion      No service coordination in this encounter.      Home Medical Care      No service coordination in this encounter.      Therapy      No service coordination in this encounter.      Community Resources      No service coordination in this encounter.          Demographic Summary     Row Name 05/01/19 9061       General Information    Admission Type  observation    Referral Source  admission list    Reason for Consult  discharge planning    Preferred Language  English     Used During This Interaction  no       Contact Information    Permission Granted to Share Info With          Functional Status    No documentation.       Psychosocial    No documentation.       Abuse/Neglect    No documentation.       Legal    No documentation.       Substance Abuse    No  documentation.       Patient Forms    No documentation.           Simon Carranza RN

## 2019-05-01 NOTE — PLAN OF CARE
Problem: Patient Care Overview  Goal: Plan of Care Review  Outcome: Ongoing (interventions implemented as appropriate)   05/01/19 5198   Coping/Psychosocial   Plan of Care Reviewed With patient   Plan of Care Review   Progress improving   OTHER   Outcome Summary post-op day 1, this afternoon pain control issue, refused therapy and ice ,removed dressing. Dr. Curry notified(medicated with ultram) and scheduled meds.given.     Goal: Individualization and Mutuality  Outcome: Ongoing (interventions implemented as appropriate)    Goal: Discharge Needs Assessment  Outcome: Ongoing (interventions implemented as appropriate)      Problem: Fall Risk (Adult)  Goal: Identify Related Risk Factors and Signs and Symptoms  Outcome: Outcome(s) achieved Date Met: 05/01/19    Goal: Absence of Fall  Outcome: Ongoing (interventions implemented as appropriate)      Problem: Pain, Acute (Adult)  Goal: Identify Related Risk Factors and Signs and Symptoms  Outcome: Outcome(s) achieved Date Met: 05/01/19    Goal: Acceptable Pain Control/Comfort Level  Outcome: Ongoing (interventions implemented as appropriate)      Problem: Knee Arthroplasty (Total, Partial) (Adult)  Goal: Signs and Symptoms of Listed Potential Problems Will be Absent, Minimized or Managed (Knee Arthroplasty)  Outcome: Ongoing (interventions implemented as appropriate)    Goal: Anesthesia/Sedation Recovery  Outcome: Ongoing (interventions implemented as appropriate)      Problem: Impaired Control (Excessive Substance Use) (Adult)  Goal: Participates in Recovery Program (Excessive Substance Use)  Outcome: Ongoing (interventions implemented as appropriate)      Problem: Social/Occupational/Functional Impairment (Excessive Substance Use) (Adult)  Goal: Improved Social/Occupational/Functional Skills (Excessive Substance Use)  Outcome: Ongoing (interventions implemented as appropriate)      Problem: Safety Awareness Impairment (Excessive Substance Use) (Adult)  Goal: Enhanced  Safety Awareness (Excessive Substance Use)  Outcome: Ongoing (interventions implemented as appropriate)      Problem: Physiological Impairment (Excessive Substance Use) (Adult)  Goal: Improved Physiologic Symptoms (Excessive Substance Use)  Outcome: Ongoing (interventions implemented as appropriate)

## 2019-05-02 VITALS
OXYGEN SATURATION: 99 % | WEIGHT: 216.6 LBS | TEMPERATURE: 98.5 F | SYSTOLIC BLOOD PRESSURE: 115 MMHG | RESPIRATION RATE: 18 BRPM | HEIGHT: 70 IN | BODY MASS INDEX: 31.01 KG/M2 | HEART RATE: 73 BPM | DIASTOLIC BLOOD PRESSURE: 70 MMHG

## 2019-05-02 PROBLEM — M17.11 OSTEOARTHRITIS OF RIGHT KNEE: Status: RESOLVED | Noted: 2019-04-30 | Resolved: 2019-05-02

## 2019-05-02 PROBLEM — M17.11 PRIMARY OSTEOARTHRITIS OF RIGHT KNEE: Status: RESOLVED | Noted: 2019-04-04 | Resolved: 2019-05-02

## 2019-05-02 LAB
ABO GROUP BLD: NORMAL
BASOPHILS # BLD AUTO: 0.03 10*3/MM3 (ref 0–0.2)
BASOPHILS NFR BLD AUTO: 0.3 % (ref 0–1.5)
DEPRECATED RDW RBC AUTO: 51.1 FL (ref 37–54)
EOSINOPHIL # BLD AUTO: 0.19 10*3/MM3 (ref 0–0.4)
EOSINOPHIL NFR BLD AUTO: 1.7 % (ref 0.3–6.2)
ERYTHROCYTE [DISTWIDTH] IN BLOOD BY AUTOMATED COUNT: 13.4 % (ref 12.3–15.4)
HCT VFR BLD AUTO: 34.8 % (ref 37.5–51)
HGB BLD-MCNC: 11.2 G/DL (ref 13–17.7)
IMM GRANULOCYTES # BLD AUTO: 0.07 10*3/MM3 (ref 0–0.05)
IMM GRANULOCYTES NFR BLD AUTO: 0.6 % (ref 0–0.5)
LYMPHOCYTES # BLD AUTO: 2.58 10*3/MM3 (ref 0.7–3.1)
LYMPHOCYTES NFR BLD AUTO: 22.9 % (ref 19.6–45.3)
MCH RBC QN AUTO: 33.4 PG (ref 26.6–33)
MCHC RBC AUTO-ENTMCNC: 32.2 G/DL (ref 31.5–35.7)
MCV RBC AUTO: 103.9 FL (ref 79–97)
MONOCYTES # BLD AUTO: 1.21 10*3/MM3 (ref 0.1–0.9)
MONOCYTES NFR BLD AUTO: 10.7 % (ref 5–12)
NEUTROPHILS # BLD AUTO: 7.2 10*3/MM3 (ref 1.7–7)
NEUTROPHILS NFR BLD AUTO: 63.8 % (ref 42.7–76)
NRBC BLD AUTO-RTO: 0 /100 WBC (ref 0–0.2)
PLATELET # BLD AUTO: 172 10*3/MM3 (ref 140–450)
PMV BLD AUTO: 10.3 FL (ref 6–12)
RBC # BLD AUTO: 3.35 10*6/MM3 (ref 4.14–5.8)
RH BLD: NEGATIVE
WBC NRBC COR # BLD: 11.28 10*3/MM3 (ref 3.4–10.8)

## 2019-05-02 PROCEDURE — 85025 COMPLETE CBC W/AUTO DIFF WBC: CPT | Performed by: ORTHOPAEDIC SURGERY

## 2019-05-02 PROCEDURE — 99024 POSTOP FOLLOW-UP VISIT: CPT | Performed by: ORTHOPAEDIC SURGERY

## 2019-05-02 PROCEDURE — G0378 HOSPITAL OBSERVATION PER HR: HCPCS

## 2019-05-02 RX ORDER — ASPIRIN 325 MG
325 TABLET ORAL EVERY 12 HOURS
Qty: 60 TABLET | Refills: 0 | Status: SHIPPED | OUTPATIENT
Start: 2019-05-02 | End: 2019-10-08

## 2019-05-02 RX ORDER — CYCLOBENZAPRINE HCL 10 MG
10 TABLET ORAL 3 TIMES DAILY
Qty: 90 TABLET | Refills: 2 | Status: SHIPPED | OUTPATIENT
Start: 2019-05-02 | End: 2019-08-30 | Stop reason: SDUPTHER

## 2019-05-02 RX ADMIN — CLOPIDOGREL BISULFATE 75 MG: 75 TABLET, FILM COATED ORAL at 08:40

## 2019-05-02 RX ADMIN — RIVAROXABAN 10 MG: 10 TABLET, FILM COATED ORAL at 08:41

## 2019-05-02 RX ADMIN — CYANOCOBALAMIN TAB 1000 MCG 1000 MCG: 1000 TAB at 08:42

## 2019-05-02 RX ADMIN — LEVOTHYROXINE SODIUM 25 MCG: 25 TABLET ORAL at 08:41

## 2019-05-02 RX ADMIN — DULOXETINE HYDROCHLORIDE 60 MG: 60 CAPSULE, DELAYED RELEASE ORAL at 08:40

## 2019-05-02 RX ADMIN — BUPRENORPHINE AND NALOXONE 0.5 FILM: 8; 2 FILM, SOLUBLE BUCCAL; SUBLINGUAL at 08:50

## 2019-05-02 RX ADMIN — ACETAMINOPHEN 1000 MG: 500 TABLET, FILM COATED ORAL at 04:45

## 2019-05-02 RX ADMIN — GABAPENTIN 800 MG: 400 CAPSULE ORAL at 08:40

## 2019-05-02 NOTE — PROGRESS NOTES
Orthopedic Progress Note   Chief Complaint: Status post right total knee    Subjective     Interval History: Patient postop day 2.  Still experiencing pain but he states is controlled with the Suboxone and Tylenol.          Objective     Vital Signs  Temp:  [97.7 °F (36.5 °C)-98.5 °F (36.9 °C)] 98.5 °F (36.9 °C)  Heart Rate:  [69-81] 73  Resp:  [16-18] 18  BP: (100-116)/(61-70) 115/70  Body mass index is 31.53 kg/m².    Intake/Output Summary (Last 24 hours) at 5/2/2019 0734  Last data filed at 5/2/2019 0700  Gross per 24 hour   Intake 1080 ml   Output 1940 ml   Net -860 ml     No intake/output data recorded.       Physical Exam:   General: patient awake, alert and cooperative   Cardiovascular: regular rhythm and rate   Pulm: clear to auscultation bilaterally   Abdomen: Benign.  Soft bowel sounds   Extremities: Dressing change wound benign.  Calf is nontender.  Good distal pulses no motor or sensory deficit   Neurologic: Normal mood and behavior     Results Review:     I reviewed the patient's new clinical results.      WBC No results found for: WBCS   HGB Hemoglobin   Date Value Ref Range Status   05/02/2019 11.2 (L) 13.0 - 17.7 g/dL Final   05/01/2019 11.3 (L) 13.0 - 17.7 g/dL Final      HCT Hematocrit   Date Value Ref Range Status   05/02/2019 34.8 (L) 37.5 - 51.0 % Final   05/01/2019 34.9 (L) 37.5 - 51.0 % Final      Platlets No results found for: LABPLAT     PT/INR:  No results found for: PROTIME/No results found for: INR    Sodium No results found for: NA   Potassium No results found for: K   Chloride No results found for: CL   Bicarbonate No results found for: PLASMABICARB   BUN No results found for: BUN   Creatinine No results found for: CREATININE   Calcium No results found for: CALCIUM   Magnesium  AST  ALT  Bilirubin, Total  AlkPhos  Albumin    Amylase  Lipase    Radiology: No results found for: MG  No components found for: AST.*  No components found for: ALT.*  No components found for: BILIRUBIN,  TOTAL.*    No components found for: ALKPHOS.*  No components found for: ALBUMIN.*      No components found for: AMYLASE.*  No components found for: LIPASE.*            Imaging Results (most recent)     Procedure Component Value Units Date/Time    XR Knee 1 or 2 View Right [[id number]] Collected:  04/30/19 1525     Updated:  04/30/19 1529    Narrative:       XR KNEE 1 OR 2 VW RIGHT-: 4/30/2019 3:05 PM     INDICATION:   Status post total knee replacement..     COMPARISON:   01/17/2019.     FINDINGS:  2 view(s) of the right knee.  Status post total right knee replacement.  Surgical hardware appears to be in satisfactory position. Expected  postoperative changes include soft tissue swelling and subcutaneous  emphysema. Radiopaque drain noted. Both of the views are degraded by  overlying support equipment artifact.. No bone erosion or destruction.   No foreign body.       Impression:       Satisfactory postoperative appearance of the right knee..     This report was finalized on 4/30/2019 3:27 PM by Dr. Holden Ortiz MD.                  lactated ringers 9 mL/hr Last Rate: Stopped (04/30/19 1511)   lactated ringers 100 mL/hr Last Rate: Stopped (04/30/19 1753)   lactated ringers 20 mL/hr Last Rate: Stopped (04/30/19 1753)         Assessment/Plan     Patient Active Problem List   Diagnosis Code   • Primary osteoarthritis of right knee M17.11   • Osteoarthritis of right knee M17.11       Home after morning physical therapy      Bob Curry MD  05/02/19  7:34 AM          Dictated utilizing Dragon dictation

## 2019-05-02 NOTE — NURSING NOTE
Case Management Discharge Note    Final Note: dc home with HH    Destination      No service has been selected for the patient.      Durable Medical Equipment - Selection Complete      Service Provider Request Status Selected Services Address Phone Number Fax Number    [facility] Selected Durable Medical Equipment [address] [place] [address] [phone]      Dialysis/Infusion      No service has been selected for the patient.      Home Medical Care - Selection Complete      Service Provider Request Status Selected Services Address Phone Number Fax Number    [facility] Selected Home Health Services [address] [place] [id number] [phone]      Therapy      No service has been selected for the patient.      Community Resources      No service has been selected for the patient.             Final Discharge Disposition Code: 06 - home with home health care

## 2019-05-02 NOTE — PLAN OF CARE
Problem: Patient Care Overview  Goal: Plan of Care Review  Outcome: Ongoing (interventions implemented as appropriate)    Goal: Individualization and Mutuality  Outcome: Ongoing (interventions implemented as appropriate)      Problem: Pain, Acute (Adult)  Goal: Acceptable Pain Control/Comfort Level  Outcome: Ongoing (interventions implemented as appropriate)

## 2019-05-02 NOTE — THERAPY DISCHARGE NOTE
Acute Care - Physical Therapy Discharge Summary   Nellie Weinstein       Patient Name: Gasper Greene  : 1970  MRN: [id number]    Today's Date: 2019  Onset of Illness/Injury or Date of Surgery: 19    Date of Referral to PT: 19  Referring Physician: Dr. Curry      Admit Date: 2019      PT Recommendation and Plan    Visit Dx:    ICD-10-CM ICD-9-CM   1. Status post total right knee replacement Z96.651 V43.65   2. Primary osteoarthritis of right knee M17.11 715.16       Outcome Measures     Row Name 19 1300 19 0851 19 0850       How much help from another person do you currently need...    Turning from your back to your side while in flat bed without using bedrails?  --  --  3  -BP    Moving from lying on back to sitting on the side of a flat bed without bedrails?  --  --  3  -BP    Moving to and from a bed to a chair (including a wheelchair)?  --  --  3  -BP    Standing up from a chair using your arms (e.g., wheelchair, bedside chair)?  --  --  3  -BP    Climbing 3-5 steps with a railing?  --  --  3  -BP    To walk in hospital room?  --  --  3  -BP    AM-PAC 6 Clicks Score  --  --  18  -BP       How much help from another is currently needed...    Putting on and taking off regular lower body clothing?  --  3  -EN  --    Bathing (including washing, rinsing, and drying)  --  3  -EN  --    Toileting (which includes using toilet bed pan or urinal)  --  3  -EN  --    Putting on and taking off regular upper body clothing  --  4  -EN  --    Taking care of personal grooming (such as brushing teeth)  --  4  -EN  --    Eating meals  --  4  -EN  --    Score  --  21  -EN  --       Functional Assessment    Outcome Measure Options  AM-PAC 6 Clicks Daily Activity (OT)  -EN  --  AM-PAC 6 Clicks Basic Mobility (PT)  -BP      User Key  (r) = Recorded By, (t) = Taken By, (c) = Cosigned By    Initials Name Provider Type    EN Carissa Kmiball, OTR Occupational Therapist    BP Meléndez,  Dennsi, PT Physical Therapist              Rehab Goal Summary     Row Name 05/02/19 0926             Bed Mobility Goal 1 (PT)    Activity/Assistive Device (Bed Mobility Goal 1, PT)  bed mobility activities, all  -BP      [place] Level/Cues Needed (Bed Mobility Goal 1, PT)  conditional independence  -BP      Time Frame (Bed Mobility Goal 1, PT)  2 days  -BP      Progress/Outcomes (Bed Mobility Goal 1, PT)  goal not met;discharged from facility patient seen for initial evaluation only   -BP         Transfer Goal 1 (PT)    Activity/Assistive Device (Transfer Goal 1, PT)  sit-to-stand/stand-to-sit  -BP      [place] Level/Cues Needed (Transfer Goal 1, PT)  supervision required  -BP      Time Frame (Transfer Goal 1, PT)  2 days  -BP      Progress/Outcome (Transfer Goal 1, PT)  goal not met;discharged from facility  -BP         Gait Training Goal 1 (PT)    Activity/Assistive Device (Gait Training Goal 1, PT)  gait (walking locomotion);walker, rolling  -BP      [place] Level (Gait Training Goal 1, PT)  supervision required  -BP      Distance (Gait Goal 1, PT)  50  -BP      Time Frame (Gait Training Goal 1, PT)  2 days  -BP      Barriers (Gait Training Goal 1, PT)  L knee OA and varus deformity  -BP      Progress/Outcome (Gait Training Goal 1, PT)  goal not met;discharged from facility  -BP         Stairs Goal 1 (PT)    Activity/Assistive Device (Stairs Goal 1, PT)  ascending stairs;descending stairs  -BP      [place] Level/Cues Needed (Stairs Goal 1, PT)  contact guard assist  -BP      Number of Stairs (Stairs Goal 1, PT)  5 1 handrail   -BP      Time Frame (Stairs Goal 1, PT)  2 days  -BP      Progress/Outcome (Stairs Goal 1, PT)  goal not met  -BP         Patient Education Goal (PT)    Activity (Patient Education Goal, PT)  LE HEP   -BP      [place]/Cues/Accuracy (Memory Goal 2, PT)  demonstrates adequately;verbalizes understanding  -BP      Time Frame (Patient Education Goal, PT)  2 days  -BP       Progress/Outcome (Patient Education Goal, PT)  goal not met;discharged from facility  -BP        User Key  (r) = Recorded By, (t) = Taken By, (c) = Cosigned By    Initials Name Provider Type Discipline    BP Dennis Meléndez, PT Physical Therapist PT          Therapy Charges for Today     Code Description Service Date Service Provider Modifiers Qty    40384934927 HC PT EVAL LOW COMPLEXITY 3 5/1/2019 Dennis Meléndez, PT GP 1          PT Discharge Summary  Anticipated Discharge Disposition (PT): home with home health  Reason for Discharge: Discharge from facility  Outcomes Achieved: (Patient refused all follow up attempts. Pt did not meet any goals )  Discharge Destination: Home with home health      Dennis Meléndez, PT   5/2/2019

## 2019-05-02 NOTE — PLAN OF CARE
"Problem: Patient Care Overview  Goal: Plan of Care Review   05/02/19 2371   Coping/Psychosocial   Plan of Care Reviewed With patient   OTHER   Outcome Summary PT: Patient refuses PT today secondary to pain. He refuses stair training, gait training and review or performance of LE HEP. He states \"i appreciate your recommendations but I'm fine.\" Educated patient on benefits of stair training as well as risks of non compliance with HEP. Patient receptive. Patient to be discharged home today with home health PT.          "

## 2019-05-02 NOTE — DISCHARGE SUMMARY
Orthopedic Discharge Summary      Patient: Gasper Greene      YOB: 1970    Medical Record Number: [id number]    Attending Physician: Bob Curry MD  Consulting Physician(s):   Date of Admission: 4/30/2019  9:42 AM  Date of Discharge: 05/02/2019      Patient Active Problem List   Diagnosis   (none) - all problems resolved or deleted     Status Post: RIGHT TOTAL KNEE ARTHROPLASTY-[place], Orth align      Allergies   Allergen Reactions   • Nsaids GI Bleeding and Confusion   • Celebrex [Celecoxib] Swelling     Swelling of leg       Current Medications:     Discharge Medications      New Medications      Instructions Start Date   aspirin 325 MG tablet  Commonly known as:  ISIDRO ASPIRIN  Replaces:  aspirin 81 MG EC tablet   325 mg, Oral, Every 12 Hours      cyclobenzaprine 10 MG tablet  Commonly known as:  FLEXERIL   10 mg, Oral, 3 Times Daily         Continue These Medications      Instructions Start Date   albuterol (2.5 MG/3ML) 0.083% nebulizer solution  Commonly known as:  PROVENTIL   Nebulization, Every 6 Hours PRN      buprenorphine-naloxone 8-2 MG per SL tablet  Commonly known as:  SUBOXONE   0.5 tablets, Sublingual, 2 Times Daily, Takes half in morning and half in afternoon      busPIRone 10 MG tablet  Commonly known as:  BUSPAR   10 mg, Oral, 3 Times Daily PRN      clopidogrel 75 MG tablet  Commonly known as:  PLAVIX   75 mg, Oral, Daily      COQ10 PO   1 tablet, Oral, Daily      cyanocobalamin 100 MCG tablet tablet  Commonly known as:  CYANOCOBALAMIN   1,000 mcg, Oral, Daily      DULoxetine 60 MG capsule  Commonly known as:  CYMBALTA   60 mg, Oral, Daily      gabapentin 800 MG tablet  Commonly known as:  NEURONTIN   800 mg, Oral, 3 Times Daily, One tablet twice a day and 2 tablets at bedtime      levothyroxine 25 MCG tablet  Commonly known as:  SYNTHROID, LEVOTHROID   25 mcg, Oral, Daily      lidocaine-prilocaine 2.5-2.5 % cream  Commonly known as:  EMLA   1 application, Topical, 3 Times  Daily PRN      multivitamin with minerals tablet tablet   1 tablet, Oral, Daily      VITAMIN D PO   1 tablet, Oral, Daily         Stop These Medications    aspirin 81 MG EC tablet  Replaced by:  aspirin 325 MG tablet     mupirocin 2 % ointment  Commonly known as:  BACTROBAN                Past Medical History:   Diagnosis Date   • Arthritis    • Chest pain     states has been told d/t anxiety   • Coronary artery disease 09/2017    s/p stents    • Frequent urination at night    • Hepatitis C    • History of bleeding ulcers 2012   • History of GI bleed 2012   • History of MI (myocardial infarction) 09/2017    posterior, Dr Napier follows   • History of palpitations     states has pain w/, cardiologist aware   • History of transfusion    • Hyperlipidemia    • Hypothyroidism    • Ischemic cardiomyopathy    • Left knee DJD    • Neuropathy of both feet    • NSVT (nonsustained ventricular tachycardia) (CMS/HCC)     h/o of after MI 2017   • Osteomyelitis of right foot (CMS/HCC)     h/o   • Right knee DJD     sched TKA   • Sleep apnea     no machine     Past Surgical History:   Procedure Laterality Date   • CARDIAC CATHETERIZATION  09/2017   • CORONARY ANGIOPLASTY WITH STENT PLACEMENT  09/2017   • JOINT REPLACEMENT      RTK   • KNEE ACL RECONSTRUCTION Left 1992   • KNEE SURGERY Right     tendon rupture and repair/replace   • METATARSAL OSTEOTOMY Right 02/2019    2nd   • STOMACH SURGERY      d/t bleeding ulcer   • TOTAL KNEE ARTHROPLASTY Right 4/30/2019    Procedure: RIGHT TOTAL KNEE ARTHROPLASTY-[place], Orth align;  Surgeon: Bob Curry MD;  Location: [facility];  Service: Orthopedics     Social History     Occupational History   • Not on file   Tobacco Use   • Smoking status: Current Every Day Smoker     Packs/day: 1.00     Years: 35.00     Pack years: 35.00     Types: Cigarettes   • Smokeless tobacco: Former User   Substance and Sexual Activity   • Alcohol use: No     Frequency: Never     Comment: h/o stopped in 2000   •  Drug use: Yes     Types: Methamphetamines, Oxycodone     Comment: history of, last 12/ 2017   • Sexual activity: Defer      Social History     Social History Narrative   • Not on file     Family History   Problem Relation Age of Onset   • Lung cancer Mother    • Alcohol abuse Father    • Seizures Father    • Diabetes Maternal Aunt    • Diabetes Maternal Uncle    • Alcohol abuse Paternal Aunt    • Alcohol abuse Paternal Uncle    • Heart disease Maternal Grandmother    • Alcohol abuse Paternal Grandmother    • Cirrhosis Paternal Grandmother    • Hypertension Paternal Grandfather    • Malig Hyperthermia Neg Hx          Physical Exam: 48 y.o. male  General Appearance:    Alert, cooperative, in no acute distress                      Vitals:    05/01/19 1515 05/01/19 1915 05/01/19 2325 05/02/19 0606   BP: 116/69 100/61 104/67 115/70   BP Location: Right arm Right arm Right arm Left arm   Patient Position: Sitting Lying Lying Sitting   Pulse: 69 81 78 73   Resp: 16 16 16 18   Temp: 98.2 °F (36.8 °C) 97.7 °F (36.5 °C) 98.1 °F (36.7 °C) 98.5 °F (36.9 °C)   TempSrc: Oral Oral Oral Oral   SpO2: 96% 99% 93% 99%   Weight:       Height:            Head:    Normocephalic, without obvious abnormality, atraumatic   Eyes:            Lids and lashes normal, conjunctivae and sclerae normal, no   icterus, no pallor, corneas clear, PERRLA   Ears:    Ears appear intact with no abnormalities noted   Throat:   No oral lesions, no thrush, oral mucosa moist   Neck:   No adenopathy, supple, trachea midline, no thyromegaly, no    carotid bruit, no JVD   Back:     No kyphosis present, no scoliosis present, no skin lesions,       erythema or scars, no tenderness to percussion or                   palpation,   range of motion normal   Lungs:     Clear to auscultation,respirations regular, even and                   unlabored    Heart:    Regular rhythm and normal rate, normal S1 and S2, no            murmur, no gallop, no rub, no click   Chest  Wall:    No abnormalities observed   Abdomen:     Normal bowel sounds, no masses, no organomegaly, soft        non-tender, non-distended, no guarding, no rebound                 tenderness   Rectal:     Deferred   Extremities:   Incision intact without signs or symptoms of infection.               Neurovascular status remains intact to operative extremity.      Moves all extremities well, no edema, no cyanosis, no              redness   Pulses:   Pulses palpable and equal bilaterally   Skin:   No bleeding, bruising or rash   Lymph nodes:   No palpable adenopathy   Neurologic:   Cranial nerves 2 - 12 grossly intact, sensation intact, DTR        present and equal bilaterally           Hospital Course:  48 y.o. male admitted to [facility] to services of Bob Curry MD with Primary osteoarthritis of right knee [M17.11]  Osteoarthritis of right knee [M17.11]  Primary osteoarthritis of right knee [M17.11] on 4/30/2019 and underwent RIGHT TOTAL KNEE ARTHROPLASTY-[place] align  Per Bob Curry MD. Antibiotic and VTE prophylaxis were per SCIP protocols. Post-operatively the patient transferred to the post-operative floor where the patient underwent mobilization therapy that included active as well as passive ROM exercises. Opioids were titrated to achieve appropriate pain management to allow for participation in mobilization exercises. Vital signs are now stable. The incision is intact without signs or symptoms of infection. Operative extremity neurovascular status remains intact.   Appropriate education re: incision care, activity levels, medications, and follow up visits was completed and all questions were answered. The patient is now deemed stable for discharge to Home.      DIAGNOSTIC TESTS:     Admission on 04/30/2019   Component Date Value Ref Range Status   • ABO Type 04/30/2019 A   Final   • RH type 04/30/2019 Negative   Final   • Antibody Screen 04/30/2019 Positive   Final   • T&S Expiration Date  04/30/2019 5/3/2019 11:59:59 PM   Final   • Anti-K 04/30/2019 ANTI-K   Final   • PTT 05/01/2019 28.7  24.3 - 38.1 seconds Final   • WBC 05/01/2019 11.42* 3.40 - 10.80 10*3/mm3 Final   • RBC 05/01/2019 3.39* 4.14 - 5.80 10*6/mm3 Final   • Hemoglobin 05/01/2019 11.3* 13.0 - 17.7 g/dL Final   • Hematocrit 05/01/2019 34.9* 37.5 - 51.0 % Final   • MCV 05/01/2019 102.9* 79.0 - 97.0 fL Final   • MCH 05/01/2019 33.3* 26.6 - 33.0 pg Final   • MCHC 05/01/2019 32.4  31.5 - 35.7 g/dL Final   • RDW 05/01/2019 13.2  12.3 - 15.4 % Final   • RDW-SD 05/01/2019 50.3  37.0 - 54.0 fl Final   • MPV 05/01/2019 10.0  6.0 - 12.0 fL Final   • Platelets 05/01/2019 209  140 - 450 10*3/mm3 Final   • Neutrophil % 05/01/2019 85.0* 42.7 - 76.0 % Final   • Lymphocyte % 05/01/2019 9.7* 19.6 - 45.3 % Final   • Monocyte % 05/01/2019 4.7* 5.0 - 12.0 % Final   • Eosinophil % 05/01/2019 0.1* 0.3 - 6.2 % Final   • Basophil % 05/01/2019 0.1  0.0 - 1.5 % Final   • Immature Grans % 05/01/2019 0.4  0.0 - 0.5 % Final   • Neutrophils, Absolute 05/01/2019 9.70* 1.70 - 7.00 10*3/mm3 Final   • Lymphocytes, Absolute 05/01/2019 1.11  0.70 - 3.10 10*3/mm3 Final   • Monocytes, Absolute 05/01/2019 0.54  0.10 - 0.90 10*3/mm3 Final   • Eosinophils, Absolute 05/01/2019 0.01  0.00 - 0.40 10*3/mm3 Final   • Basophils, Absolute 05/01/2019 0.01  0.00 - 0.20 10*3/mm3 Final   • Immature Grans, Absolute 05/01/2019 0.05  0.00 - 0.05 10*3/mm3 Final   • nRBC 05/01/2019 0.0  0.0 - 0.2 /100 WBC Final   • ABO Type 05/01/2019 A   Final   • RH type 05/01/2019 Negative   Final   • WBC 05/02/2019 11.28* 3.40 - 10.80 10*3/mm3 Final   • RBC 05/02/2019 3.35* 4.14 - 5.80 10*6/mm3 Final   • Hemoglobin 05/02/2019 11.2* 13.0 - 17.7 g/dL Final   • Hematocrit 05/02/2019 34.8* 37.5 - 51.0 % Final   • MCV 05/02/2019 103.9* 79.0 - 97.0 fL Final   • MCH 05/02/2019 33.4* 26.6 - 33.0 pg Final   • MCHC 05/02/2019 32.2  31.5 - 35.7 g/dL Final   • RDW 05/02/2019 13.4  12.3 - 15.4 % Final   • RDW-SD  05/02/2019 51.1  37.0 - 54.0 fl Final   • MPV 05/02/2019 10.3  6.0 - 12.0 fL Final   • Platelets 05/02/2019 172  140 - 450 10*3/mm3 Final   • Neutrophil % 05/02/2019 63.8  42.7 - 76.0 % Final   • Lymphocyte % 05/02/2019 22.9  19.6 - 45.3 % Final   • Monocyte % 05/02/2019 10.7  5.0 - 12.0 % Final   • Eosinophil % 05/02/2019 1.7  0.3 - 6.2 % Final   • Basophil % 05/02/2019 0.3  0.0 - 1.5 % Final   • Immature Grans % 05/02/2019 0.6* 0.0 - 0.5 % Final   • Neutrophils, Absolute 05/02/2019 7.20* 1.70 - 7.00 10*3/mm3 Final   • Lymphocytes, Absolute 05/02/2019 2.58  0.70 - 3.10 10*3/mm3 Final   • Monocytes, Absolute 05/02/2019 1.21* 0.10 - 0.90 10*3/mm3 Final   • Eosinophils, Absolute 05/02/2019 0.19  0.00 - 0.40 10*3/mm3 Final   • Basophils, Absolute 05/02/2019 0.03  0.00 - 0.20 10*3/mm3 Final   • Immature Grans, Absolute 05/02/2019 0.07* 0.00 - 0.05 10*3/mm3 Final   • nRBC 05/02/2019 0.0  0.0 - 0.2 /100 WBC Final       No results found.    Discharge and Follow up Instructions:   Please see discharge and follow-up instructions  Weightbearing as tolerated surgical extremity   Ambulatory referral home health PT    mg DVT prophylaxis     Date: 5/2/2019    Bob Curry MD

## 2019-05-02 NOTE — NURSING NOTE
Continued Stay Note  MALU Eason     Patient Name: Gasper Greene  MRN: [id number]  Today's Date: 5/2/2019    Admit Date: 4/30/2019    Discharge Plan     Row Name 05/02/19 1102       Plan    Plan  plan home with home health    Patient/Family in Agreement with Plan  yes    Plan Comments  rec'd call from [facility] who states she is unable to verify patient insurnace. Using SS# no active medicaid comes up in system. LVM for Stacy Cortes/Registration to verify patient insurance/coverage. Spoke with patient at bedside, verified SS# - first  3 of SS# listed on face sheet is 700, patient states it should be 400 - remainder of SS# is correct. LVM [facility] with correct information, request return call if continued issues. Will update registration to correct face sheet. Patient is ready for dc.    Row Name 05/02/19 1045       Plan    Plan  plan home with home health    Plan Comments  Aurel[facility] arrives to deliver DME to patient room. Will continue to follow.        Discharge Codes    No documentation.       Expected Discharge Date and Time     Expected Discharge Date Expected Discharge Time    May 2, 2019             Simon Carranza RN

## 2019-05-03 ENCOUNTER — TELEPHONE (OUTPATIENT)
Dept: ORTHOPEDIC SURGERY | Facility: CLINIC | Age: 49
End: 2019-05-03

## 2019-05-03 ENCOUNTER — READMISSION MANAGEMENT (OUTPATIENT)
Dept: CALL CENTER | Facility: HOSPITAL | Age: 49
End: 2019-05-03

## 2019-05-03 NOTE — OUTREACH NOTE
Prep Survey      Responses   Facility patient discharged from?  [place]   Is LACE score < 7 ?  Yes   Is patient eligible?  Yes   Discharge diagnosis  Right total knee arthroplaasty   Does the patient have one of the following disease processes/diagnoses(primary or secondary)?  Other [Right total knee LACE <7]   Does the patient have Home health ordered?  Yes   What is the Home health agency?   [facility]   Is there a DME ordered?  Yes   What DME was ordered?  [employer] for DME Commode chair and walker   Comments regarding appointments  See AVS   Prep survey completed?  Yes          Abbie Rawls RN

## 2019-05-03 NOTE — TELEPHONE ENCOUNTER
"Tony Krishnan with [facility] calling leaving a voicemail [phone] stating that the upon arriving home the patient's home that it was reported that the patient took \"something\" and a \"couple\" of flexeril's and fell onto his surgical knee. He is post op from a Right total knee on 04.30.2019. Per the therapist the bottom of the incision is open and according to the female at the house the patient has been out of it since arriving home. They were advised to seek urgent treatment at the ED for the open surgical incision as well as the patient's mental status. Tony with [facility] was in agreeance.   "

## 2019-05-04 ENCOUNTER — HOSPITAL ENCOUNTER (EMERGENCY)
Facility: HOSPITAL | Age: 49
Discharge: HOME | End: 2019-05-04
Attending: EMERGENCY MEDICINE | Admitting: EMERGENCY MEDICINE

## 2019-05-04 ENCOUNTER — APPOINTMENT (OUTPATIENT)
Dept: GENERAL RADIOLOGY | Facility: HOSPITAL | Age: 49
End: 2019-05-04

## 2019-05-04 VITALS
TEMPERATURE: 98.4 F | RESPIRATION RATE: 16 BRPM | DIASTOLIC BLOOD PRESSURE: 57 MMHG | SYSTOLIC BLOOD PRESSURE: 105 MMHG | OXYGEN SATURATION: 94 % | WEIGHT: 216 LBS | HEART RATE: 66 BPM | HEIGHT: 69 IN | BODY MASS INDEX: 31.99 KG/M2

## 2019-05-04 DIAGNOSIS — T81.30XA WOUND DEHISCENCE: Primary | ICD-10-CM

## 2019-05-04 PROCEDURE — 99282 EMERGENCY DEPT VISIT SF MDM: CPT | Performed by: EMERGENCY MEDICINE

## 2019-05-04 PROCEDURE — 73560 X-RAY EXAM OF KNEE 1 OR 2: CPT

## 2019-05-04 PROCEDURE — 99283 EMERGENCY DEPT VISIT LOW MDM: CPT

## 2019-05-04 RX ORDER — DOXYCYCLINE 100 MG/1
100 CAPSULE ORAL 2 TIMES DAILY
Qty: 14 CAPSULE | Refills: 0 | Status: SHIPPED | OUTPATIENT
Start: 2019-05-04 | End: 2019-05-11

## 2019-05-04 RX ORDER — ACETAMINOPHEN 500 MG
1000 TABLET ORAL 2 TIMES DAILY
COMMUNITY
End: 2019-10-17 | Stop reason: HOSPADM

## 2019-05-04 NOTE — ED NOTES
Pt sitting up in chair at bedside, small amount of serosang drainage from right knee incision noted.     Graham Smith RN  05/04/19 0376

## 2019-05-04 NOTE — ED PROVIDER NOTES
EMERGENCY DEPARTMENT ENCOUNTER      Room Number: 4/04      HPI:    Chief complaint: Knee injury    Location: Right knee    Quality/Severity: Minor    Timing/Duration: Patient fell 2 days ago    Modifying Factors: None    Associated Symptoms: Bleeding    Narrative: Pt is a 48 y.o. male who presents complaining of right knee pain.  The patient had a total knee replacement on April 30 and fell 2 days later.  Since that time the patient has had oozing of blood from the inferior portion of his incision.      PMD: Nila Anne APRN    REVIEW OF SYSTEMS  Review of Systems   Musculoskeletal: Positive for arthralgias and joint swelling.   Skin: Positive for wound (Surgical and bleeding since fall 2 days ago).   All other systems reviewed and are negative.      PAST MEDICAL HISTORY  Active Ambulatory Problems     Diagnosis Date Noted   • No Active Ambulatory Problems     Resolved Ambulatory Problems     Diagnosis Date Noted   • Primary osteoarthritis of right knee 04/04/2019   • Osteoarthritis of right knee 04/30/2019     Past Medical History:   Diagnosis Date   • Arthritis    • Chest pain    • Coronary artery disease 09/2017   • Frequent urination at night    • Hepatitis C    • History of bleeding ulcers 2012   • History of GI bleed 2012   • History of MI (myocardial infarction) 09/2017   • History of palpitations    • History of transfusion    • Hyperlipidemia    • Hypothyroidism    • Ischemic cardiomyopathy    • Left knee DJD    • Neuropathy of both feet    • NSVT (nonsustained ventricular tachycardia) (CMS/HCC)    • Osteomyelitis of right foot (CMS/HCC)    • Right knee DJD    • Sleep apnea        PAST SURGICAL HISTORY  Past Surgical History:   Procedure Laterality Date   • CARDIAC CATHETERIZATION  09/2017   • CORONARY ANGIOPLASTY WITH STENT PLACEMENT  09/2017   • JOINT REPLACEMENT      RTK   • KNEE ACL RECONSTRUCTION Left 1992   • KNEE SURGERY Right     tendon rupture and repair/replace   • METATARSAL OSTEOTOMY Right  02/2019    2nd   • STOMACH SURGERY      d/t bleeding ulcer   • TOTAL KNEE ARTHROPLASTY Right 4/30/2019    Procedure: RIGHT TOTAL KNEE ARTHROPLASTY-[place], Orth align;  Surgeon: Bob Curry MD;  Location: [facility];  Service: Orthopedics       FAMILY HISTORY  Family History   Problem Relation Age of Onset   • Lung cancer Mother    • Alcohol abuse Father    • Seizures Father    • Diabetes Maternal Aunt    • Diabetes Maternal Uncle    • Alcohol abuse Paternal Aunt    • Alcohol abuse Paternal Uncle    • Heart disease Maternal Grandmother    • Alcohol abuse Paternal Grandmother    • Cirrhosis Paternal Grandmother    • Hypertension Paternal Grandfather    • Malig Hyperthermia Neg Hx        SOCIAL HISTORY  Social History     Socioeconomic History   • Marital status: Single     Spouse name: Not on file   • Number of children: Not on file   • Years of education: Not on file   • Highest education level: Not on file   Tobacco Use   • Smoking status: Current Every Day Smoker     Packs/day: 1.00     Years: 35.00     Pack years: 35.00     Types: Cigarettes   • Smokeless tobacco: Former User   Substance and Sexual Activity   • Alcohol use: No     Frequency: Never     Comment: h/o stopped in 2000   • Drug use: Yes     Types: Methamphetamines, Oxycodone     Comment: history of, last 12/ 2017, and narcotics   • Sexual activity: Defer       ALLERGIES  Nsaids and Celebrex [celecoxib]    PHYSICAL EXAM  ED Triage Vitals [05/04/19 1025]   Temp Heart Rate Resp BP SpO2   98.4 °F (36.9 °C) 66 16 (!) 73/51 94 %      Temp src Heart Rate Source Patient Position BP Location FiO2 (%)   Oral Monitor Lying Right arm --       Physical Exam   Constitutional: He is well-developed, well-nourished, and in no distress.   Musculoskeletal:   Examination of the right knee does show some diffuse erythema with a large effusion.  Heat present.  The inferior 2.5 cms of the suture line does show a superficial dehiscence with a small amount of oozing blood.   Neuromuscular vascular exams intact distally.   Skin: There is pallor (Mild).       LAB RESULTS        I ordered the above labs and reviewed the results    RADIOLOGY  Xr Chest 2 View    Result Date: 4/9/2019  Narrative: CHEST X-RAY, 04/09/2019     HISTORY: 48-year-old male undergoing preoperative testing prior to scheduled knee surgery. 35 year smoking history. Coronary artery disease with stents.  TECHNIQUE: PA and lateral upright chest x-ray.  FINDINGS: Heart size and pulmonary vascularity are normal. The lungs are expanded and clear. No visible pulmonary infiltrate or pleural effusion.      Impression: No active disease.  This report was finalized on 4/9/2019 2:31 PM by Dr. Gabe Mcdaniels MD.      Xr Knee 1 Or 2 View Right    Result Date: 5/4/2019  Narrative: CR Knee 1 or 2 Vws RT INDICATION: Total knee replacement on April 30 and patient fell Pt had knee replacement surgery April 23rd;Pt fell this week on Wed and again on Thurs;Knee pain, swelling and drainage COMPARISON: 4/30/2019, preoperative radiographs 1/17/2019 FINDINGS: 2 view(s) of the right knee. There has been total knee arthroplasty. The hardware is intact and there is no evidence of hardware loosening or hardware failure. No fracture or dislocation is noted. There is a moderate size knee joint is more indistinct on the preoperative radiographs 1/17/2019 though this may relate to surgery. Sequelae of remote Osgood-Schlatter's disease with prominent ossicle seen on the tibial tuberosity at the patellar tendon insertion.     Impression: 1.  Status post total knee arthroplasty. No evidence of hardware complication. 2.  No definite fracture identified. There is a knee joint effusion and there is indistinctness of the distal quadriceps tendon shadow which is new since the preoperative radiographs of 1/17/2019, a finding which is nonspecific, could be related to relatively recent surgery. Signer Name: Des Leach MD  Signed: 5/4/2019 12:40 PM   Workstation Name: [id number]     Xr Knee 1 Or 2 View Right    Result Date: 4/30/2019  Narrative: XR KNEE 1 OR 2 VW RIGHT-: 4/30/2019 3:05 PM  INDICATION: Status post total knee replacement..  COMPARISON: 01/17/2019.  FINDINGS: 2 view(s) of the right knee.  Status post total right knee replacement. Surgical hardware appears to be in satisfactory position. Expected postoperative changes include soft tissue swelling and subcutaneous emphysema. Radiopaque drain noted. Both of the views are degraded by overlying support equipment artifact.. No bone erosion or destruction. No foreign body.      Impression: Satisfactory postoperative appearance of the right knee..  This report was finalized on 4/30/2019 3:27 PM by Dr. Holden Ortiz MD.        I ordered the above radiologic testing and reviewed the results    PROCEDURES  Procedures      PROGRESS AND CONSULTS  ED Course as of May 04 1459   Sat May 04, 2019   1309 Case and findings discussed with the patient's orthopedic surgeon, Dr. Bob Curry, who requested that Steri-Strips placed along with a 7-day Silvadene bandage.  [ML]      ED Course User Index  [ML] Blayne Shaffer MD           MEDICAL DECISION MAKING  Results were reviewed/discussed with the patient and they were also made aware of online access. Pt also made aware that some labs, such as cultures, will not be resulted during ER visit and follow up with PMD is necessary.     MDM  Number of Diagnoses or Management Options     Amount and/or Complexity of Data Reviewed  Tests in the radiology section of CPT®: ordered and reviewed  Discuss the patient with other providers: yes (Dr. Curry)  Independent visualization of images, tracings, or specimens: yes    Risk of Complications, Morbidity, and/or Mortality  Presenting problems: moderate  Diagnostic procedures: moderate  Management options: moderate           DIAGNOSIS  Final diagnoses:   Wound dehiscence       Latest Documented Vital Signs:  As of 2:59 PM  BP-  105/57 HR- 66 Temp- 98.4 °F (36.9 °C) (Oral) O2 sat- 94%    DISPOSITION  Patient discharged in good condition       Medication List      New Prescriptions    doxycycline 100 MG capsule  Commonly known as:  MONODOX  Take 1 capsule by mouth 2 (Two) Times a Day for 7 days.          Follow-up Information     Bob Curry MD.    Specialty:  Orthopedic Surgery  Why:  Orthopedics-next week  Contact information:  [address] [facility] [address] [phone]                      Blayne Shaffer MD  05/04/19 7281

## 2019-05-04 NOTE — ED NOTES
Right knee dressing removed, small amount of serosang drainage from incision noted. Incision well approx yet poserior end of incision where theirs drainage small opening noted, area red and warm to touch.     Graham Smith, RN  05/04/19 3382       Graham Smith, ZHANNA  05/04/19 1191

## 2019-05-06 ENCOUNTER — READMISSION MANAGEMENT (OUTPATIENT)
Dept: CALL CENTER | Facility: HOSPITAL | Age: 49
End: 2019-05-06

## 2019-05-06 LAB
ABO + RH BLD: NORMAL
ABO + RH BLD: NORMAL
BH BB BLOOD EXPIRATION DATE: NORMAL
BH BB BLOOD EXPIRATION DATE: NORMAL
BH BB BLOOD TYPE BARCODE: 600
BH BB BLOOD TYPE BARCODE: 600
BH BB DISPENSE STATUS: NORMAL
BH BB DISPENSE STATUS: NORMAL
BH BB PRODUCT CODE: NORMAL
BH BB PRODUCT CODE: NORMAL
BH BB UNIT NUMBER: NORMAL
BH BB UNIT NUMBER: NORMAL
CROSSMATCH INTERPRETATION: NORMAL
CROSSMATCH INTERPRETATION: NORMAL
UNIT  ABO: NORMAL
UNIT  ABO: NORMAL
UNIT  RH: NORMAL
UNIT  RH: NORMAL

## 2019-05-06 NOTE — OUTREACH NOTE
LAG < 7 Survey      Responses   Facility patient discharged from?  [place]   Does the patient have one of the following disease processes/diagnoses(primary or secondary)?  Other   Is there a successful TCM telephone encounter documented?  No   BHLAG <7 Attempt successful?  Yes   Call start time  1446   Call end time  1505   General alerts for this patient  Patient fell and has had blood oozing from lower part of incision.     Discharge diagnosis  Right total knee arthroplaasty   Medication alerts for this patient  New on antibiotic since fall doxycycline for 7 days twice daily. Also given Flexeril but is not tolerating so has stopped this due to hallucinations      Meds reviewed with patient/caregiver?  Yes   Is the patient having any side effects they believe may be caused by any medication additions or changes?  No   Does the patient have all medications ordered at discharge?  Yes   Is the patient taking all medications as directed (includes completed medication regime)?  Yes   Comments regarding appointments  See AVS   Does the patient have a primary care provider?   Yes   Does the patient have an appointment with their PCP within 7 days of discharge?  Yes   Comments regarding PCP  has follow up but not sure when    Has the patient kept scheduled appointments due by today?  Yes   What is the Home health agency?   [facility]    Home health comments  HH came but patient was having adverse from Flexeril,    What DME was ordered?  [employer] for DME Commode chair and walker   Has all DME been delivered?  Yes   Did the patient receive a copy of their discharge instructions?  Yes   Nursing interventions  Reviewed instructions with patient   What is the patient's perception of their health status since discharge?  Same   Is the patient/caregiver able to teach back signs and symptoms related to disease process for when to call PCP?  Yes   Is the patient/caregiver able to teach back signs and symptoms related to  disease process for when to call 911?  Yes   Is the patient/caregiver able to teach back the hierarchy of who to call/visit for symptoms/problems? PCP, Specialist, Home health nurse, Urgent Care, ED, 911  Yes   Graduated  Yes          Maria Dolores Cobos RN

## 2019-05-08 ENCOUNTER — TELEPHONE (OUTPATIENT)
Dept: SLEEP MEDICINE | Facility: HOSPITAL | Age: 49
End: 2019-05-08

## 2019-05-08 NOTE — TELEPHONE ENCOUNTER
Spoke w/patient about results, patient recovering from surgery and will call back to schedule when he is feeling better-AK

## 2019-05-15 ENCOUNTER — OFFICE VISIT (OUTPATIENT)
Dept: ORTHOPEDIC SURGERY | Facility: CLINIC | Age: 49
End: 2019-05-15

## 2019-05-15 VITALS — WEIGHT: 216 LBS | BODY MASS INDEX: 30.92 KG/M2 | HEIGHT: 70 IN

## 2019-05-15 DIAGNOSIS — Z96.651 STATUS POST TOTAL RIGHT KNEE REPLACEMENT: Primary | ICD-10-CM

## 2019-05-15 PROCEDURE — 99024 POSTOP FOLLOW-UP VISIT: CPT | Performed by: ORTHOPAEDIC SURGERY

## 2019-05-15 NOTE — PROGRESS NOTES
Subjective: Status post right total knee arthroplasty     Patient ID: Gasper Greene is a 48 y.o. male.    Chief Complaint:    History of Present Illness patient is 2 weeks out doing fairly well.  His biggest problem is quad atrophy and weakness that he had preoperatively in that leg.  He has been going to physical therapy and is progressing fairly well.  Ambulating with a walker he does not have enough quad strength and stability to ambulate with a cane       Social History     Occupational History   • Not on file   Tobacco Use   • Smoking status: Current Every Day Smoker     Packs/day: 1.00     Years: 35.00     Pack years: 35.00     Types: Cigarettes   • Smokeless tobacco: Former User   Substance and Sexual Activity   • Alcohol use: No     Frequency: Never     Comment: h/o stopped in 2000   • Drug use: Yes     Types: Methamphetamines, Oxycodone     Comment: history of, last 12/ 2017, and narcotics   • Sexual activity: Defer      Review of Systems   Constitutional: Negative for chills, diaphoresis, fever and unexpected weight change.   HENT: Negative for hearing loss, nosebleeds, sore throat and tinnitus.    Eyes: Negative for pain and visual disturbance.   Respiratory: Negative for cough, shortness of breath and wheezing.    Cardiovascular: Negative for chest pain and palpitations.   Gastrointestinal: Negative for abdominal pain, diarrhea, nausea and vomiting.   Endocrine: Negative for cold intolerance, heat intolerance and polydipsia.   Genitourinary: Negative for difficulty urinating, dysuria and hematuria.   Musculoskeletal: Positive for arthralgias and myalgias. Negative for joint swelling.   Skin: Negative for rash and wound.   Allergic/Immunologic: Negative for environmental allergies.   Neurological: Negative for dizziness, syncope and numbness.   Hematological: Does not bruise/bleed easily.   Psychiatric/Behavioral: Negative for dysphoric mood and sleep disturbance. The patient is not nervous/anxious.           Past Medical History:   Diagnosis Date   • Arthritis    • Chest pain     states has been told d/t anxiety   • Coronary artery disease 09/2017    s/p stents    • Frequent urination at night    • Hepatitis C    • History of bleeding ulcers 2012   • History of GI bleed 2012   • History of MI (myocardial infarction) 09/2017    posterior, Dr Napier follows   • History of palpitations     states has pain w/, cardiologist aware   • History of transfusion    • Hyperlipidemia    • Hypothyroidism    • Ischemic cardiomyopathy    • Left knee DJD    • Neuropathy of both feet    • NSVT (nonsustained ventricular tachycardia) (CMS/HCC)     h/o of after MI 2017   • Osteomyelitis of right foot (CMS/HCC)     h/o   • Right knee DJD     sched TKA   • Sleep apnea     no machine     Past Surgical History:   Procedure Laterality Date   • CARDIAC CATHETERIZATION  09/2017   • CORONARY ANGIOPLASTY WITH STENT PLACEMENT  09/2017   • JOINT REPLACEMENT      RTK   • KNEE ACL RECONSTRUCTION Left 1992   • KNEE SURGERY Right     tendon rupture and repair/replace   • METATARSAL OSTEOTOMY Right 02/2019    2nd   • STOMACH SURGERY      d/t bleeding ulcer   • TOTAL KNEE ARTHROPLASTY Right 4/30/2019    Procedure: RIGHT TOTAL KNEE ARTHROPLASTY-[place], Orth align;  Surgeon: Bob Curry MD;  Location: [facility];  Service: Orthopedics     Family History   Problem Relation Age of Onset   • Lung cancer Mother    • Alcohol abuse Father    • Seizures Father    • Diabetes Maternal Aunt    • Diabetes Maternal Uncle    • Alcohol abuse Paternal Aunt    • Alcohol abuse Paternal Uncle    • Heart disease Maternal Grandmother    • Alcohol abuse Paternal Grandmother    • Cirrhosis Paternal Grandmother    • Hypertension Paternal Grandfather    • Malig Hyperthermia Neg Hx          Objective:  There were no vitals filed for this visit.      05/15/19  1119   Weight: 98 kg (216 lb)     Body mass index is 31.44 kg/m².        Ortho Exam   He is alert and oriented x3.   His wounds completely benign.  Therapy has his passive range of motion is  and active 20 to 100 degrees.  Quad lag secondary to VMO atrophy.  No motor or sensory deficit the calf is nontender.    Assessment:        1. Status post total right knee replacement           Plan: Continue strengthening program return 2 weeks with an x-ray of the knee return            Work Status:    ORTIZ query complete.    Orders:  No orders of the defined types were placed in this encounter.      Medications:  No orders of the defined types were placed in this encounter.      Followup:  Return in about 2 weeks (around 5/29/2019).          Dictated utilizing Dragon dictation

## 2019-05-23 ENCOUNTER — DOCUMENTATION (OUTPATIENT)
Dept: SLEEP MEDICINE | Facility: HOSPITAL | Age: 49
End: 2019-05-23

## 2019-05-23 NOTE — PROGRESS NOTES
Have left several messages for patient to call me to discuss getting his Titration study scheduled. To date patient has not returned any of the messages.

## 2019-05-30 ENCOUNTER — OFFICE VISIT (OUTPATIENT)
Dept: ORTHOPEDIC SURGERY | Facility: CLINIC | Age: 49
End: 2019-05-30

## 2019-05-30 VITALS — BODY MASS INDEX: 30.92 KG/M2 | WEIGHT: 216 LBS | HEIGHT: 70 IN

## 2019-05-30 DIAGNOSIS — Z96.651 STATUS POST TOTAL RIGHT KNEE REPLACEMENT: Primary | ICD-10-CM

## 2019-05-30 PROCEDURE — 99024 POSTOP FOLLOW-UP VISIT: CPT | Performed by: ORTHOPAEDIC SURGERY

## 2019-05-30 PROCEDURE — 73562 X-RAY EXAM OF KNEE 3: CPT | Performed by: ORTHOPAEDIC SURGERY

## 2019-05-30 NOTE — PROGRESS NOTES
Subjective:     Patient ID: Gasper Greene is a 48 y.o. male.    Chief Complaint:    History of Present Illness       Social History     Occupational History   • Not on file   Tobacco Use   • Smoking status: Current Every Day Smoker     Packs/day: 1.00     Years: 35.00     Pack years: 35.00     Types: Cigarettes   • Smokeless tobacco: Former User   Substance and Sexual Activity   • Alcohol use: No     Frequency: Never     Comment: h/o stopped in 2000   • Drug use: Yes     Types: Methamphetamines, Oxycodone     Comment: history of, last 12/ 2017, and narcotics   • Sexual activity: Defer      Review of Systems   Constitutional: Negative for chills, diaphoresis, fever and unexpected weight change.   HENT: Negative for hearing loss, nosebleeds, sore throat and tinnitus.    Eyes: Negative for pain and visual disturbance.   Respiratory: Negative for cough, shortness of breath and wheezing.    Cardiovascular: Negative for chest pain and palpitations.   Gastrointestinal: Negative for abdominal pain, diarrhea, nausea and vomiting.   Endocrine: Negative for cold intolerance, heat intolerance and polydipsia.   Genitourinary: Negative for difficulty urinating, dysuria and hematuria.   Musculoskeletal: Positive for arthralgias, joint swelling and myalgias.   Skin: Negative for rash and wound.   Allergic/Immunologic: Negative for environmental allergies.   Neurological: Negative for dizziness, syncope and numbness.   Hematological: Does not bruise/bleed easily.   Psychiatric/Behavioral: Negative for dysphoric mood and sleep disturbance. The patient is not nervous/anxious.          Past Medical History:   Diagnosis Date   • Arthritis    • Chest pain     states has been told d/t anxiety   • Coronary artery disease 09/2017    s/p stents    • Frequent urination at night    • Hepatitis C    • History of bleeding ulcers 2012   • History of GI bleed 2012   • History of MI (myocardial infarction) 09/2017    posterior, Dr Napier follows    • History of palpitations     states has pain w/, cardiologist aware   • History of transfusion    • Hyperlipidemia    • Hypothyroidism    • Ischemic cardiomyopathy    • Left knee DJD    • Neuropathy of both feet    • NSVT (nonsustained ventricular tachycardia) (CMS/HCC)     h/o of after MI 2017   • Osteomyelitis of right foot (CMS/HCC)     h/o   • Right knee DJD     sched TKA   • Sleep apnea     no machine     Past Surgical History:   Procedure Laterality Date   • CARDIAC CATHETERIZATION  09/2017   • CORONARY ANGIOPLASTY WITH STENT PLACEMENT  09/2017   • JOINT REPLACEMENT      RTK   • KNEE ACL RECONSTRUCTION Left 1992   • KNEE SURGERY Right     tendon rupture and repair/replace   • METATARSAL OSTEOTOMY Right 02/2019 2nd   • STOMACH SURGERY      d/t bleeding ulcer   • TOTAL KNEE ARTHROPLASTY Right 4/30/2019    Procedure: RIGHT TOTAL KNEE ARTHROPLASTY-[place], Orth align;  Surgeon: Bob Curry MD;  Location: [facility];  Service: Orthopedics     Family History   Problem Relation Age of Onset   • Lung cancer Mother    • Alcohol abuse Father    • Seizures Father    • Diabetes Maternal Aunt    • Diabetes Maternal Uncle    • Alcohol abuse Paternal Aunt    • Alcohol abuse Paternal Uncle    • Heart disease Maternal Grandmother    • Alcohol abuse Paternal Grandmother    • Cirrhosis Paternal Grandmother    • Hypertension Paternal Grandfather    • Malig Hyperthermia Neg Hx          Objective:  There were no vitals filed for this visit.      05/30/19  1127   Weight: 98 kg (216 lb)     Body mass index is 31.44 kg/m².        Ortho Exam       Assessment:      No diagnosis found.       Plan:            Work Status:    [employer] query complete.    Orders:  No orders of the defined types were placed in this encounter.      Medications:  No orders of the defined types were placed in this encounter.      Followup:  No Follow-up on file.          Dictated utilizing Dragon dictation

## 2019-06-05 ENCOUNTER — TELEPHONE (OUTPATIENT)
Dept: ORTHOPEDIC SURGERY | Facility: CLINIC | Age: 49
End: 2019-06-05

## 2019-06-05 NOTE — TELEPHONE ENCOUNTER
[facility] faxing for an order for outpatient PT for the above patient. They are requesting outpatient PT orders be placed.    Thanks.

## 2019-06-07 DIAGNOSIS — Z96.659 STATUS POST TOTAL KNEE REPLACEMENT, UNSPECIFIED LATERALITY: Primary | ICD-10-CM

## 2019-07-01 ENCOUNTER — OFFICE VISIT (OUTPATIENT)
Dept: ORTHOPEDIC SURGERY | Facility: CLINIC | Age: 49
End: 2019-07-01

## 2019-07-01 DIAGNOSIS — Z96.659 STATUS POST TOTAL KNEE REPLACEMENT, UNSPECIFIED LATERALITY: Primary | ICD-10-CM

## 2019-07-01 DIAGNOSIS — M24.561 CONTRACTURE OF RIGHT KNEE: ICD-10-CM

## 2019-07-01 PROCEDURE — 99024 POSTOP FOLLOW-UP VISIT: CPT | Performed by: ORTHOPAEDIC SURGERY

## 2019-07-01 NOTE — PROGRESS NOTES
Subjective: Status post right total knee     Patient ID: Gasper Greene is a 48 y.o. male.    Chief Complaint:    History of Present Illness patient is 2 months out is doing fairly well but is developed an extension contracture of about 10 degrees.  A lot of his preoperative pain is resolved but having some soreness from the extension contracture.  Has flexion past 125 degrees.       Social History     Occupational History   • Not on file   Tobacco Use   • Smoking status: Current Every Day Smoker     Packs/day: 1.00     Years: 35.00     Pack years: 35.00     Types: Cigarettes   • Smokeless tobacco: Former User   Substance and Sexual Activity   • Alcohol use: No     Frequency: Never     Comment: h/o stopped in 2000   • Drug use: Yes     Types: Methamphetamines, Oxycodone     Comment: history of, last 12/ 2017, and narcotics   • Sexual activity: Defer      Review of Systems   Constitutional: Negative for chills, diaphoresis, fever and unexpected weight change.   HENT: Negative for hearing loss, nosebleeds, sore throat and tinnitus.    Eyes: Negative for pain and visual disturbance.   Respiratory: Negative for cough, shortness of breath and wheezing.    Cardiovascular: Negative for chest pain and palpitations.   Gastrointestinal: Negative for abdominal pain, diarrhea, nausea and vomiting.   Endocrine: Negative for cold intolerance, heat intolerance and polydipsia.   Genitourinary: Negative for difficulty urinating, dysuria and hematuria.   Musculoskeletal: Positive for arthralgias. Negative for joint swelling and myalgias.   Skin: Negative for rash and wound.   Allergic/Immunologic: Negative for environmental allergies.   Neurological: Negative for dizziness, syncope and numbness.   Hematological: Does not bruise/bleed easily.   Psychiatric/Behavioral: Negative for dysphoric mood and sleep disturbance. The patient is not nervous/anxious.          Past Medical History:   Diagnosis Date   • Arthritis    • Chest pain      states has been told d/t anxiety   • Coronary artery disease 09/2017    s/p stents    • Frequent urination at night    • Hepatitis C    • History of bleeding ulcers 2012   • History of GI bleed 2012   • History of MI (myocardial infarction) 09/2017    posterior, Dr Napier follows   • History of palpitations     states has pain w/, cardiologist aware   • History of transfusion    • Hyperlipidemia    • Hypothyroidism    • Ischemic cardiomyopathy    • Left knee DJD    • Neuropathy of both feet    • NSVT (nonsustained ventricular tachycardia) (CMS/HCC)     h/o of after MI 2017   • Osteomyelitis of right foot (CMS/HCC)     h/o   • Right knee DJD     sched TKA   • Sleep apnea     no machine     Past Surgical History:   Procedure Laterality Date   • CARDIAC CATHETERIZATION  09/2017   • CORONARY ANGIOPLASTY WITH STENT PLACEMENT  09/2017   • JOINT REPLACEMENT      RTK   • KNEE ACL RECONSTRUCTION Left 1992   • KNEE SURGERY Right     tendon rupture and repair/replace   • METATARSAL OSTEOTOMY Right 02/2019 2nd   • STOMACH SURGERY      d/t bleeding ulcer   • TOTAL KNEE ARTHROPLASTY Right 4/30/2019    Procedure: RIGHT TOTAL KNEE ARTHROPLASTY-[place], Orth align;  Surgeon: Bob Curry MD;  Location: [facility];  Service: Orthopedics     Family History   Problem Relation Age of Onset   • Lung cancer Mother    • Alcohol abuse Father    • Seizures Father    • Diabetes Maternal Aunt    • Diabetes Maternal Uncle    • Alcohol abuse Paternal Aunt    • Alcohol abuse Paternal Uncle    • Heart disease Maternal Grandmother    • Alcohol abuse Paternal Grandmother    • Cirrhosis Paternal Grandmother    • Hypertension Paternal Grandfather    • Malig Hyperthermia Neg Hx          Objective:  There were no vitals filed for this visit.  There were no vitals filed for this visit.  There is no height or weight on file to calculate BMI.        Ortho Exam   He is alert and oriented x3.  The knee shows no swelling effusion erythema.  He has motion  from 10 degrees to maybe 120 possibly 125 degrees.  No instability.  Quad function is 4 out of 5.  Calves are nontender.  Good distal pulses no motor or sensory deficit.    Assessment:        1. Status post total knee replacement, unspecified laterality    2. Contracture of right knee           Plan: Patient is developed an extension contracture to get it fitted for an extension Dynasplint.  Return to see me in 4 weeks with a lateral x-ray of the knee.            Work Status:    ORTIZ query complete.    Orders:  No orders of the defined types were placed in this encounter.      Medications:  No orders of the defined types were placed in this encounter.      Followup:  Return in about 4 weeks (around 7/29/2019).          Dictated utilizing Dragon dictation

## 2019-07-18 ENCOUNTER — TELEPHONE (OUTPATIENT)
Dept: ORTHOPEDIC SURGERY | Facility: CLINIC | Age: 49
End: 2019-07-18

## 2019-07-18 NOTE — TELEPHONE ENCOUNTER
Laura with [facility] calling to verify with Dr. Curry that he just needed an extension Dynasplint, Dr. Curry did speak with her and confirmed her his office note the patient indeed just needed and extension Dynasplint.     Thanks.

## 2019-08-01 ENCOUNTER — OFFICE VISIT (OUTPATIENT)
Dept: ORTHOPEDIC SURGERY | Facility: CLINIC | Age: 49
End: 2019-08-01

## 2019-08-01 VITALS — HEIGHT: 70 IN | BODY MASS INDEX: 30.92 KG/M2 | WEIGHT: 216 LBS

## 2019-08-01 DIAGNOSIS — Z96.659 STATUS POST TOTAL KNEE REPLACEMENT, UNSPECIFIED LATERALITY: Primary | ICD-10-CM

## 2019-08-01 DIAGNOSIS — M24.561 CONTRACTURE OF RIGHT KNEE: ICD-10-CM

## 2019-08-01 PROCEDURE — 73560 X-RAY EXAM OF KNEE 1 OR 2: CPT | Performed by: ORTHOPAEDIC SURGERY

## 2019-08-01 PROCEDURE — 99213 OFFICE O/P EST LOW 20 MIN: CPT | Performed by: ORTHOPAEDIC SURGERY

## 2019-08-01 RX ORDER — ESCITALOPRAM OXALATE 10 MG/1
20 TABLET ORAL DAILY
COMMUNITY
End: 2019-10-14 | Stop reason: SDUPTHER

## 2019-08-01 RX ORDER — BUPRENORPHINE AND NALOXONE 8; 2 MG/1; MG/1
1.5 FILM, SOLUBLE BUCCAL; SUBLINGUAL DAILY
COMMUNITY
End: 2019-09-17

## 2019-08-01 NOTE — PROGRESS NOTES
Subjective: Status post right total knee with extension contracture     Patient ID: Gasper Greene is a 48 y.o. male.    Chief Complaint:    History of Present Illness patient little over 3 months out from his surgery is ambulate independently with a cane.  He has had the extension Dynasplint for about 10 days and is using it only 4 hours a day.  Again his preoperative pain has resolved but he is having postoperative discomfort.       Social History     Occupational History   • Not on file   Tobacco Use   • Smoking status: Current Every Day Smoker     Packs/day: 1.00     Years: 35.00     Pack years: 35.00     Types: Cigarettes   • Smokeless tobacco: Former User   Substance and Sexual Activity   • Alcohol use: No     Frequency: Never     Comment: h/o stopped in 2000   • Drug use: Yes     Types: Methamphetamines, Oxycodone     Comment: history of, last 12/ 2017, and narcotics   • Sexual activity: Defer      Review of Systems   Constitutional: Negative for chills, diaphoresis, fever and unexpected weight change.   HENT: Negative for hearing loss, nosebleeds, sore throat and tinnitus.    Eyes: Negative for pain and visual disturbance.   Respiratory: Negative for cough, shortness of breath and wheezing.    Cardiovascular: Negative for chest pain and palpitations.   Gastrointestinal: Negative for abdominal pain, diarrhea, nausea and vomiting.   Endocrine: Negative for cold intolerance, heat intolerance and polydipsia.   Genitourinary: Negative for difficulty urinating, dysuria and hematuria.   Musculoskeletal: Positive for arthralgias and myalgias.   Skin: Negative for rash and wound.   Allergic/Immunologic: Negative for environmental allergies.   Neurological: Negative for dizziness, syncope and numbness.   Hematological: Does not bruise/bleed easily.   Psychiatric/Behavioral: Negative for dysphoric mood and sleep disturbance. The patient is not nervous/anxious.          Past Medical History:   Diagnosis Date   •  Arthritis    • Chest pain     states has been told d/t anxiety   • Coronary artery disease 09/2017    s/p stents    • Frequent urination at night    • Hepatitis C    • History of bleeding ulcers 2012   • History of GI bleed 2012   • History of MI (myocardial infarction) 09/2017    posterior, Dr Napier follows   • History of palpitations     states has pain w/, cardiologist aware   • History of transfusion    • Hyperlipidemia    • Hypothyroidism    • Ischemic cardiomyopathy    • Left knee DJD    • Neuropathy of both feet    • NSVT (nonsustained ventricular tachycardia) (CMS/HCC)     h/o of after MI 2017   • Osteomyelitis of right foot (CMS/HCC)     h/o   • Right knee DJD     sched TKA   • Sleep apnea     no machine     Past Surgical History:   Procedure Laterality Date   • CARDIAC CATHETERIZATION  09/2017   • CORONARY ANGIOPLASTY WITH STENT PLACEMENT  09/2017   • JOINT REPLACEMENT      RTK   • KNEE ACL RECONSTRUCTION Left 1992   • KNEE SURGERY Right     tendon rupture and repair/replace   • METATARSAL OSTEOTOMY Right 02/2019 2nd   • STOMACH SURGERY      d/t bleeding ulcer   • TOTAL KNEE ARTHROPLASTY Right 4/30/2019    Procedure: RIGHT TOTAL KNEE ARTHROPLASTY-[place], Orth align;  Surgeon: Bob Curry MD;  Location: [facility];  Service: Orthopedics     Family History   Problem Relation Age of Onset   • Lung cancer Mother    • Alcohol abuse Father    • Seizures Father    • Diabetes Maternal Aunt    • Diabetes Maternal Uncle    • Alcohol abuse Paternal Aunt    • Alcohol abuse Paternal Uncle    • Heart disease Maternal Grandmother    • Alcohol abuse Paternal Grandmother    • Cirrhosis Paternal Grandmother    • Hypertension Paternal Grandfather    • Malig Hyperthermia Neg Hx          Objective:  There were no vitals filed for this visit.      08/01/19  1311   Weight: 98 kg (216 lb)     Body mass index is 31.44 kg/m².        Ortho Exam     A lateral x-ray done today shows he still has 15 to 20 degrees of extension  contracture.  He is alert and oriented x3.  The knee shows some bogginess tendinitis but no effusion.  Calf is nontender.  Can flex past 110 possibly 115 degrees.  No instability at 0 90 degrees.  Good distal pulses no motor or sensory deficit good capillary refill.  Quad function is 4 out of 5.  Again his preoperative pain has resolved but the soreness now is coming from the extension contracture.  Assessment:        1. Status post total knee replacement, unspecified laterality    2. Contracture of right knee           Plan: Over 10 minutes was spent once again with the patient discussing use of the extension Dynasplint on the importance of using at least 8 hours a day.  Answered all questions return to see me in 4 weeks with a repeat lateral x-ray of the knee            Work Status:    ORTIZ query complete.    Orders:  Orders Placed This Encounter   Procedures   • XR Knee 1 or 2 View Right       Medications:  No orders of the defined types were placed in this encounter.      Followup:  Return in about 4 weeks (around 8/29/2019).          Dictated utilizing Dragon dictation

## 2019-08-22 ENCOUNTER — PREP FOR SURGERY (OUTPATIENT)
Dept: OTHER | Facility: HOSPITAL | Age: 49
End: 2019-08-22

## 2019-08-22 ENCOUNTER — OFFICE VISIT (OUTPATIENT)
Dept: ORTHOPEDIC SURGERY | Facility: CLINIC | Age: 49
End: 2019-08-22

## 2019-08-22 VITALS — BODY MASS INDEX: 30.92 KG/M2 | HEIGHT: 70 IN | WEIGHT: 216 LBS

## 2019-08-22 DIAGNOSIS — R52 PAIN: Primary | ICD-10-CM

## 2019-08-22 DIAGNOSIS — M17.12 PRIMARY OSTEOARTHRITIS OF LEFT KNEE: ICD-10-CM

## 2019-08-22 DIAGNOSIS — Z96.659 STATUS POST TOTAL KNEE REPLACEMENT, UNSPECIFIED LATERALITY: ICD-10-CM

## 2019-08-22 DIAGNOSIS — M17.12 PRIMARY OSTEOARTHRITIS OF LEFT KNEE: Primary | ICD-10-CM

## 2019-08-22 PROCEDURE — 99214 OFFICE O/P EST MOD 30 MIN: CPT | Performed by: ORTHOPAEDIC SURGERY

## 2019-08-22 PROCEDURE — 73560 X-RAY EXAM OF KNEE 1 OR 2: CPT | Performed by: ORTHOPAEDIC SURGERY

## 2019-08-22 RX ORDER — VELPATASVIR AND SOFOSBUVIR 100; 400 MG/1; MG/1
1 TABLET, FILM COATED ORAL DAILY
COMMUNITY
Start: 2019-08-05 | End: 2020-01-06

## 2019-08-22 RX ORDER — HEPATITIS A AND HEPATITIS B (RECOMBINANT) VACCINE 720; 20 [IU]/ML; UG/ML
INJECTION, SUSPENSION INTRAMUSCULAR
Status: ON HOLD | COMMUNITY
Start: 2019-08-06 | End: 2019-10-15

## 2019-08-22 RX ORDER — PREGABALIN 75 MG/1
150 CAPSULE ORAL ONCE
Status: CANCELLED | OUTPATIENT
Start: 2019-09-24

## 2019-08-22 RX ORDER — CEFAZOLIN SODIUM 2 G/50ML
2 SOLUTION INTRAVENOUS ONCE
Status: CANCELLED | OUTPATIENT
Start: 2019-09-24

## 2019-08-22 RX ORDER — ACETAMINOPHEN 500 MG
1000 TABLET ORAL ONCE
Status: CANCELLED | OUTPATIENT
Start: 2019-09-24

## 2019-08-22 NOTE — PROGRESS NOTES
Subjective:   Status post right total knee arthroplasty, osteoarthritis left knee   Patient ID: Gasper Greene is a 48 y.o. male.    Chief Complaint:    History of Present Illness patient is now almost 4 months status post a right total knee and is improved significantly.  He now has full extension with use of the extension Dynasplint again all of his preoperative pain has resolved.  Left knee however remains very symptomatic causing him to walk with antalgic gait       Social History     Occupational History   • Not on file   Tobacco Use   • Smoking status: Current Every Day Smoker     Packs/day: 1.00     Years: 35.00     Pack years: 35.00     Types: Cigarettes   • Smokeless tobacco: Former User   Substance and Sexual Activity   • Alcohol use: No     Frequency: Never     Comment: h/o stopped in 2000   • Drug use: Yes     Types: Methamphetamines, Oxycodone     Comment: history of, last 12/ 2017, and narcotics   • Sexual activity: Defer      Review of Systems   Constitutional: Negative for chills, diaphoresis, fever and unexpected weight change.   HENT: Negative for hearing loss, nosebleeds, sore throat and tinnitus.    Eyes: Negative for pain and visual disturbance.   Respiratory: Negative for cough, shortness of breath and wheezing.    Cardiovascular: Negative for chest pain and palpitations.   Gastrointestinal: Negative for abdominal pain, diarrhea, nausea and vomiting.   Endocrine: Negative for cold intolerance, heat intolerance and polydipsia.   Genitourinary: Negative for difficulty urinating, dysuria and hematuria.   Musculoskeletal: Positive for arthralgias, joint swelling and myalgias.   Skin: Negative for rash and wound.   Allergic/Immunologic: Negative for environmental allergies.   Neurological: Negative for dizziness, syncope and numbness.   Hematological: Does not bruise/bleed easily.   Psychiatric/Behavioral: Negative for dysphoric mood and sleep disturbance. The patient is not nervous/anxious.    All  other systems reviewed and are negative.          Objective:      Ortho Exam   Lateral of the right knee to evaluate his extension contracture shoulder is completely resolved he now has full extension.  Compared to previous x-rays he is a significant improvement.  He is alert and oriented x3.  He has flexion of the right knee is about 125 degrees.  There is no instability at 0 90 degrees and there is no varus or valgus instability 0 30 degrees.  The knee is anatomically aligned.  Quad function is 4-1/2 out of 5.  His calf is nontender.  Good distal pulses no motor or sensory deficit.  With regard to the left knee he has a probably 15 to 20 degrees varus deformity.  He can extend within 5 degrees flexion past 95 degrees.  Mild varus instability at 0 degrees and 90 degrees.  Probably about 5 degrees.  No instability at 0 but does have some increased AP play at 90 degrees.  Quad function is 4 out of 5 the calf is nontender.  Good distal pulses no motor or sensory deficit good capillary refill.  All wounds are benign and the skin is cool to touch.    Assessment:        1. Pain    2. Status post total knee replacement, unspecified laterality    3. Primary osteoarthritis of left knee           Plan: Spent over 15 minutes with the patient reviewing the results of his x-rays and his exam of the right knee.  He is gained full extension is doing well all of his preoperative pain has resolved and is pleased with the results.  He now wants to proceed with surgery on that left knee.  Again he had an ACL reconstruction many years ago is asked end-stage tricompartmental arthritis with a varus deformity.  Discussed the surgery with him on this knee he may need possibly a hinged prosthesis due to the ligament laxity and I discussed this with him which increases the risk for the need for revision at some point in his lifetime but he wants to proceed if the pain is quite severe.  Reviewed one briefly with him once again the risk of  surgery which are the same as they were when he had his previous surgery done on the right knee.  Tenderness set up for September 24.                      Dictated utilizing Dragon dictation

## 2019-08-30 RX ORDER — CYCLOBENZAPRINE HCL 10 MG
TABLET ORAL
Qty: 90 TABLET | Refills: 1 | Status: SHIPPED | OUTPATIENT
Start: 2019-08-30 | End: 2019-09-17

## 2019-09-16 ENCOUNTER — APPOINTMENT (OUTPATIENT)
Dept: PREADMISSION TESTING | Facility: HOSPITAL | Age: 49
End: 2019-09-16

## 2019-09-17 ENCOUNTER — APPOINTMENT (OUTPATIENT)
Dept: PREADMISSION TESTING | Facility: HOSPITAL | Age: 49
End: 2019-09-17

## 2019-09-17 VITALS
HEART RATE: 76 BPM | OXYGEN SATURATION: 98 % | BODY MASS INDEX: 33.96 KG/M2 | SYSTOLIC BLOOD PRESSURE: 110 MMHG | HEIGHT: 69 IN | DIASTOLIC BLOOD PRESSURE: 60 MMHG | RESPIRATION RATE: 16 BRPM | WEIGHT: 229.3 LBS

## 2019-09-17 DIAGNOSIS — M17.12 PRIMARY OSTEOARTHRITIS OF LEFT KNEE: ICD-10-CM

## 2019-09-17 LAB
ABO GROUP BLD: NORMAL
ANION GAP SERPL CALCULATED.3IONS-SCNC: 11.6 MMOL/L (ref 5–15)
ANTI-K: NORMAL
BASOPHILS # BLD AUTO: 0.05 10*3/MM3 (ref 0–0.2)
BASOPHILS NFR BLD AUTO: 0.8 % (ref 0–1.5)
BILIRUB UR QL STRIP: NEGATIVE
BLD GP AB SCN SERPL QL: POSITIVE
BUN BLD-MCNC: 11 MG/DL (ref 6–20)
BUN/CREAT SERPL: 13.3 (ref 7–25)
CALCIUM SPEC-SCNC: 9.3 MG/DL (ref 8.6–10.5)
CHLORIDE SERPL-SCNC: 99 MMOL/L (ref 98–107)
CLARITY UR: CLEAR
CO2 SERPL-SCNC: 25.4 MMOL/L (ref 22–29)
COLOR UR: YELLOW
CREAT BLD-MCNC: 0.83 MG/DL (ref 0.76–1.27)
DEPRECATED RDW RBC AUTO: 49.1 FL (ref 37–54)
EOSINOPHIL # BLD AUTO: 0.31 10*3/MM3 (ref 0–0.4)
EOSINOPHIL NFR BLD AUTO: 4.8 % (ref 0.3–6.2)
ERYTHROCYTE [DISTWIDTH] IN BLOOD BY AUTOMATED COUNT: 13.7 % (ref 12.3–15.4)
GFR SERPL CREATININE-BSD FRML MDRD: 98 ML/MIN/1.73
GLUCOSE BLD-MCNC: 123 MG/DL (ref 65–99)
GLUCOSE UR STRIP-MCNC: NEGATIVE MG/DL
HCT VFR BLD AUTO: 39.6 % (ref 37.5–51)
HGB BLD-MCNC: 13.1 G/DL (ref 13–17.7)
HGB UR QL STRIP.AUTO: NEGATIVE
IMM GRANULOCYTES # BLD AUTO: 0.01 10*3/MM3 (ref 0–0.05)
IMM GRANULOCYTES NFR BLD AUTO: 0.2 % (ref 0–0.5)
KETONES UR QL STRIP: NEGATIVE
LEUKOCYTE ESTERASE UR QL STRIP.AUTO: NEGATIVE
LYMPHOCYTES # BLD AUTO: 2.59 10*3/MM3 (ref 0.7–3.1)
LYMPHOCYTES NFR BLD AUTO: 39.7 % (ref 19.6–45.3)
MCH RBC QN AUTO: 31.8 PG (ref 26.6–33)
MCHC RBC AUTO-ENTMCNC: 33.1 G/DL (ref 31.5–35.7)
MCV RBC AUTO: 96.1 FL (ref 79–97)
MONOCYTES # BLD AUTO: 0.47 10*3/MM3 (ref 0.1–0.9)
MONOCYTES NFR BLD AUTO: 7.2 % (ref 5–12)
NEUTROPHILS # BLD AUTO: 3.09 10*3/MM3 (ref 1.7–7)
NEUTROPHILS NFR BLD AUTO: 47.3 % (ref 42.7–76)
NITRITE UR QL STRIP: NEGATIVE
NRBC BLD AUTO-RTO: 0 /100 WBC (ref 0–0.2)
PH UR STRIP.AUTO: 6 [PH] (ref 4.5–8)
PLATELET # BLD AUTO: 260 10*3/MM3 (ref 140–450)
PMV BLD AUTO: 9.3 FL (ref 6–12)
POTASSIUM BLD-SCNC: 4.1 MMOL/L (ref 3.5–5.2)
PROT UR QL STRIP: NEGATIVE
RBC # BLD AUTO: 4.12 10*6/MM3 (ref 4.14–5.8)
RH BLD: NEGATIVE
SODIUM BLD-SCNC: 136 MMOL/L (ref 136–145)
SP GR UR STRIP: 1.02 (ref 1–1.03)
T&S EXPIRATION DATE: NORMAL
UROBILINOGEN UR QL STRIP: NORMAL
WBC NRBC COR # BLD: 6.52 10*3/MM3 (ref 3.4–10.8)

## 2019-09-17 PROCEDURE — 85025 COMPLETE CBC W/AUTO DIFF WBC: CPT | Performed by: ORTHOPAEDIC SURGERY

## 2019-09-17 PROCEDURE — 86870 RBC ANTIBODY IDENTIFICATION: CPT | Performed by: ORTHOPAEDIC SURGERY

## 2019-09-17 PROCEDURE — 86901 BLOOD TYPING SEROLOGIC RH(D): CPT | Performed by: ORTHOPAEDIC SURGERY

## 2019-09-17 PROCEDURE — 93005 ELECTROCARDIOGRAM TRACING: CPT

## 2019-09-17 PROCEDURE — 86900 BLOOD TYPING SEROLOGIC ABO: CPT | Performed by: ORTHOPAEDIC SURGERY

## 2019-09-17 PROCEDURE — 80048 BASIC METABOLIC PNL TOTAL CA: CPT | Performed by: ORTHOPAEDIC SURGERY

## 2019-09-17 PROCEDURE — 93010 ELECTROCARDIOGRAM REPORT: CPT | Performed by: INTERNAL MEDICINE

## 2019-09-17 PROCEDURE — 81003 URINALYSIS AUTO W/O SCOPE: CPT | Performed by: ORTHOPAEDIC SURGERY

## 2019-09-17 PROCEDURE — 86850 RBC ANTIBODY SCREEN: CPT | Performed by: ORTHOPAEDIC SURGERY

## 2019-09-17 PROCEDURE — 36415 COLL VENOUS BLD VENIPUNCTURE: CPT | Performed by: ORTHOPAEDIC SURGERY

## 2019-09-17 RX ORDER — ALBUTEROL SULFATE 90 UG/1
2 AEROSOL, METERED RESPIRATORY (INHALATION)
COMMUNITY

## 2019-09-17 RX ORDER — CYCLOBENZAPRINE HCL 10 MG
10 TABLET ORAL 3 TIMES DAILY
COMMUNITY
End: 2019-12-02 | Stop reason: SDUPTHER

## 2019-09-17 NOTE — PAT
Pt here for PAT visit.  Pre-op tests completed, chg soap given, and instructions reviewed.  Instructed clears until 2 hrs prior to arrival time, voiced understanding.  Instructed no smoking after midnight night prior, voiced understanding.  LOV Dr Napier 1/24/2019, states f/u 3 months, pt states he hasn't seen since.  Previous medical clearance by ERIKA Lo; new NP in pt's PCP office.  Will have anesthesia review to see if new clearances needed.

## 2019-09-17 NOTE — DISCHARGE INSTRUCTIONS
PRE-ADMISSION TESTING INSTRUCTIONS FOR TOTAL JOINT PATIENTS    Take these medications the morning of surgery with a small sip of water: use nebulizer, levothyroxine, suboxone, lexapro, gabapentin, and Sofosbuvir-Velpatasvir       No aspirin, advil, aleve, ibuprofen, naproxen, diet pills, decongestants, or vitamin/herbal supplements for a week prior to your surgery.    Do not take any insulin or diabetes medications the morning of surgery.      Start your Bactroban ointment on ____Thursday 9/19/19_______.  You will need to apply the ointment with a clean Q-tip 3 times a day in both sides of your nose for 5 days and the morning of surgery.    General Instructions:    • Do not eat solid food after midnight the night before surgery.  No gum, mints, or hard candy after midnight the night before surgery.  • You may drink clear liquids the day of surgery up until 2 hours before your arrival time.  • Clear liquids are liquids you can see through. Nothing RED in color.    Plain water    Sports drinks  Sodas     Gelatin (Jell-O)  Fruit juices without pulp such as white grape juice and apple juice  Popsicles that contain no fruit or yogurt  Tea or coffee (no cream or milk added)    • It is beneficial for you to have a clear drink that contains carbohydrates just before you leave your house and before your fasting time begins.  We suggest a 20 ounce bottle of Gatorade or Powerade for non-diabetic patients or a 20 ounce bottle of G2 or Powerade Zero for diabetic patients.     • Patients who avoid smoking, chewing tobacco and alcohol for 4 weeks prior to surgery have a reduced risk of post-operative complications.  If at all possible, quit smoking as many days before surgery as you can.    • Do not smoke, use chewing tobacco or drink alcohol after midnight the day of surgery.    • Bring your C-PAP/ BI-PAP machine if you use one.  • Wear clean comfortable clothes and socks.  • Do not wear contact lenses, lotion, deodorant, or  make-up.  Bring a case for your glasses if applicable.   • You may brush your teeth the morning of surgery.  • You may wear dentures/partials, do not put adhesive/glue on them.  • Bring crutches or walker if applicable.  • Leave all other jewelry and valuables at home.  • NOTIFY YOUR SURGEON IF YOU BECOME ILL, HAVE A FEVER, PRODUCTIVE COUGH, OR CANNOT BE HERE THE DAY OF SURGERY.      Preventing a Surgical Site Infection:    • Shower the night before and on the morning of surgery using the chlorhexidine soap you were given.  Use a clean washcloth with the soap.  Place clean sheets on your bed after showering the night before surgery. Do not use the CHG soap on your hair, face, or private areas. Wash your body gently for five (5) minutes. Do not scrub your skin.  Dry with a clean towel and dress in clean clothing.    • Do not shave the surgical area for 10 days-2 weeks prior to surgery  because the razor can irritate skin and make it easier to develop an infection.  • Make sure you, your family, and all healthcare providers clean their hands with soap and water or an alcohol based hand  before caring for you or your wound.      Day of surgery:    Your surgeon’s office will advise you of your arrival time for the day of surgery.    Upon arrival, a Pre-op nurse and Anesthesia provider will review your health history, obtain vital signs, and answer questions you may have.  The only belongings needed at this time will be your home medications and if applicable your C-PAP/BI-PAP machine.  If you are staying overnight your family can leave the rest of your belongings in the car and bring them to your room later.  A Pre-op nurse will start an IV and you may receive medication in preparation for surgery, including something to help you relax.  Your family will be able to see you in the Pre-op area.  While you are in surgery your family should notify the waiting room  if they leave the waiting room area  and provide a contact phone number.    If you have any questions, you can call the Pre-Admission Department at [phone] or your surgeon's office.    Please be aware that surgery does come with discomfort.  We want to make every effort to control your discomfort so please discuss any uncontrolled symptoms with your nurse.   Your doctor will most likely have prescribed pain medications.     You may have bruising or discomfort from the tourniquet used in surgery.     Please leave all luggage in the car the morning of surgery.  You will be transported to your hospital room following the recovery period.  Your family can get your luggage at that time.      You may receive a survey regarding the care you received. Your feedback is very important and will be used to collect the necessary data to help us to continue to provide excellent care.

## 2019-09-18 ENCOUNTER — TELEPHONE (OUTPATIENT)
Dept: ORTHOPEDIC SURGERY | Facility: CLINIC | Age: 49
End: 2019-09-18

## 2019-09-18 ENCOUNTER — ANESTHESIA EVENT (OUTPATIENT)
Dept: PERIOP | Facility: HOSPITAL | Age: 49
End: 2019-09-18

## 2019-09-18 PROCEDURE — 86902 BLOOD TYPE ANTIGEN DONOR EA: CPT

## 2019-09-18 NOTE — TELEPHONE ENCOUNTER
Called patient per Dr Curry's request and informed him to stop taking the aspirin 325mg daily and instead take the aspirin 81mg daily until surgery (this was the dosage he was on prior to his first total knee surgery in May). Patient aware.    Also made patient aware that anesthesia was requesting cardiac clearance, and that he may/may not need an appt with them. I had called and spoken with Dr. Napier's MA and was waiting to hear back.

## 2019-09-19 ENCOUNTER — TELEPHONE (OUTPATIENT)
Dept: ORTHOPEDIC SURGERY | Facility: CLINIC | Age: 49
End: 2019-09-19

## 2019-09-19 NOTE — TELEPHONE ENCOUNTER
Mr Jc’s pharmacy sheet recommends only Vanc however you ordered kefzol and vanc. Would you like to keep or cancel the kefzol? (I believe I can do it for you if you let me know)

## 2019-09-20 ENCOUNTER — TELEPHONE (OUTPATIENT)
Dept: ORTHOPEDIC SURGERY | Facility: CLINIC | Age: 49
End: 2019-09-20

## 2019-09-20 NOTE — TELEPHONE ENCOUNTER
Pt states he has a bad cold, he is wheezing really bad and coughing up nasty stuff.  He states he can only see his PCP on Tuesdays and doesn't want to go to ER or Urgent care to sit for 5 hours.  Therefore we will postpone the surgery until he is cold free and not wheezing.  Surgery will be canceled for Tuesday.

## 2019-09-23 ENCOUNTER — OFFICE VISIT (OUTPATIENT)
Dept: ORTHOPEDIC SURGERY | Facility: CLINIC | Age: 49
End: 2019-09-23

## 2019-09-23 DIAGNOSIS — M17.12 PRIMARY OSTEOARTHRITIS OF LEFT KNEE: Primary | ICD-10-CM

## 2019-09-24 ENCOUNTER — ANESTHESIA (OUTPATIENT)
Dept: PERIOP | Facility: HOSPITAL | Age: 49
End: 2019-09-24

## 2019-10-08 RX ORDER — ASPIRIN 81 MG/1
81 TABLET ORAL DAILY
COMMUNITY
End: 2019-10-17 | Stop reason: HOSPADM

## 2019-10-14 ENCOUNTER — OFFICE VISIT (OUTPATIENT)
Dept: ORTHOPEDIC SURGERY | Facility: CLINIC | Age: 49
End: 2019-10-14

## 2019-10-14 ENCOUNTER — LAB (OUTPATIENT)
Dept: LAB | Facility: HOSPITAL | Age: 49
End: 2019-10-14

## 2019-10-14 VITALS — HEIGHT: 69 IN | WEIGHT: 229 LBS | BODY MASS INDEX: 33.92 KG/M2

## 2019-10-14 DIAGNOSIS — M17.12 PRIMARY OSTEOARTHRITIS OF LEFT KNEE: ICD-10-CM

## 2019-10-14 DIAGNOSIS — M17.12 PRIMARY OSTEOARTHRITIS OF LEFT KNEE: Primary | ICD-10-CM

## 2019-10-14 PROCEDURE — 86920 COMPATIBILITY TEST SPIN: CPT

## 2019-10-14 PROCEDURE — 86902 BLOOD TYPE ANTIGEN DONOR EA: CPT

## 2019-10-14 PROCEDURE — 86922 COMPATIBILITY TEST ANTIGLOB: CPT

## 2019-10-14 PROCEDURE — 36415 COLL VENOUS BLD VENIPUNCTURE: CPT

## 2019-10-14 PROCEDURE — 86901 BLOOD TYPING SEROLOGIC RH(D): CPT | Performed by: ORTHOPAEDIC SURGERY

## 2019-10-14 PROCEDURE — S0260 H&P FOR SURGERY: HCPCS | Performed by: ORTHOPAEDIC SURGERY

## 2019-10-14 PROCEDURE — 86900 BLOOD TYPING SEROLOGIC ABO: CPT | Performed by: ORTHOPAEDIC SURGERY

## 2019-10-14 PROCEDURE — 86870 RBC ANTIBODY IDENTIFICATION: CPT | Performed by: ORTHOPAEDIC SURGERY

## 2019-10-14 PROCEDURE — 86850 RBC ANTIBODY SCREEN: CPT | Performed by: ORTHOPAEDIC SURGERY

## 2019-10-14 RX ORDER — ESCITALOPRAM OXALATE 20 MG/1
20 TABLET ORAL DAILY
COMMUNITY
Start: 2019-10-10 | End: 2020-02-19 | Stop reason: ALTCHOICE

## 2019-10-14 ASSESSMENT — KOOS JR
KOOS JR SCORE: 20.941
KOOS JR SCORE: 25

## 2019-10-14 NOTE — PROGRESS NOTES
Subjective: Osteoarthritis left knee     Patient ID: Gasper Greene is a 48 y.o. male.    Chief Complaint:    History of Present Illness patient seen for H&P to undergo left total knee arthroplasty tomorrow       Social History     Occupational History   • Not on file   Tobacco Use   • Smoking status: Current Every Day Smoker     Packs/day: 1.00     Years: 35.00     Pack years: 35.00     Types: Cigarettes   • Smokeless tobacco: Former User   Substance and Sexual Activity   • Alcohol use: No     Frequency: Never     Comment: h/o stopped in 2000   • Drug use: Yes     Types: Methamphetamines, Oxycodone     Comment: history of, last 12/ 2017, and narcotics   • Sexual activity: Defer      Review of Systems   Constitutional: Negative for chills, diaphoresis, fever and unexpected weight change.   HENT: Negative for hearing loss, nosebleeds, sore throat and tinnitus.    Eyes: Negative for pain and visual disturbance.   Respiratory: Negative for cough, shortness of breath and wheezing.    Cardiovascular: Negative for chest pain and palpitations.   Gastrointestinal: Negative for abdominal pain, diarrhea, nausea and vomiting.   Endocrine: Negative for cold intolerance, heat intolerance and polydipsia.   Genitourinary: Negative for difficulty urinating, dysuria and hematuria.   Musculoskeletal: Positive for arthralgias and myalgias. Negative for joint swelling.   Skin: Negative for rash and wound.   Allergic/Immunologic: Negative for environmental allergies.   Neurological: Negative for dizziness, syncope and numbness.   Hematological: Does not bruise/bleed easily.   Psychiatric/Behavioral: Negative for dysphoric mood and sleep disturbance. The patient is not nervous/anxious.          Past Medical History:   Diagnosis Date   • Arthritis    • Chest pain     states has been told d/t anxiety   • Coronary artery disease 09/2017    s/p stents, Dr Napier lov 1/2019   • Frequent urination at night    • Hepatitis C     Dr Melendrez  treating   • History of bleeding ulcers 2012   • History of GI bleed 2012   • History of MI (myocardial infarction) 09/2017    posterior, Dr Napier follows   • History of palpitations     states has pain w/, cardiologist aware   • History of transfusion    • Hyperlipidemia    • Hypothyroidism    • Ischemic cardiomyopathy    • Left knee DJD     sched TKA   • Neuropathy of both feet    • NSVT (nonsustained ventricular tachycardia) (CMS/HCC)     h/o of after MI 2017   • Osteomyelitis of right foot (CMS/HCC)     h/o   • Sleep apnea     no machine     Past Surgical History:   Procedure Laterality Date   • CARDIAC CATHETERIZATION  09/2017   • CORONARY ANGIOPLASTY WITH STENT PLACEMENT  09/2017   • JOINT REPLACEMENT      RTK   • KNEE ACL RECONSTRUCTION Left 1992   • KNEE SURGERY Right     tendon rupture and repair/replace   • METATARSAL OSTEOTOMY Right 02/2019 2nd   • STOMACH SURGERY      d/t bleeding ulcer   • TOTAL KNEE ARTHROPLASTY Right 4/30/2019    Procedure: RIGHT TOTAL KNEE ARTHROPLASTY-[place], Orth align;  Surgeon: Bob Curry MD;  Location: [facility];  Service: Orthopedics     Family History   Problem Relation Age of Onset   • Lung cancer Mother    • Alcohol abuse Father    • Seizures Father    • Diabetes Maternal Aunt    • Diabetes Maternal Uncle    • Alcohol abuse Paternal Aunt    • Alcohol abuse Paternal Uncle    • Heart disease Maternal Grandmother    • Alcohol abuse Paternal Grandmother    • Cirrhosis Paternal Grandmother    • Hypertension Paternal Grandfather    • Malig Hyperthermia Neg Hx          Objective:  There were no vitals filed for this visit.      10/14/19  1128   Weight: 104 kg (229 lb)     Body mass index is 33.82 kg/m².        Ortho Exam   H&P completed    Assessment:        1. Primary osteoarthritis of left knee           Plan: All questions answered            Work Status:    [employer] query complete.    Orders:  Orders Placed This Encounter   Procedures   • Type & Screen        Medications:  No orders of the defined types were placed in this encounter.      Followup:  Return in about 2 weeks (around 10/28/2019).          Dictated utilizing Dragon dictation

## 2019-10-15 ENCOUNTER — HOSPITAL ENCOUNTER (OUTPATIENT)
Facility: HOSPITAL | Age: 49
Discharge: HOME | End: 2019-10-17
Attending: ORTHOPAEDIC SURGERY | Admitting: ORTHOPAEDIC SURGERY

## 2019-10-15 ENCOUNTER — APPOINTMENT (OUTPATIENT)
Dept: GENERAL RADIOLOGY | Facility: HOSPITAL | Age: 49
End: 2019-10-15

## 2019-10-15 DIAGNOSIS — Z96.652 STATUS POST TOTAL LEFT KNEE REPLACEMENT: ICD-10-CM

## 2019-10-15 DIAGNOSIS — M17.12 PRIMARY OSTEOARTHRITIS OF LEFT KNEE: ICD-10-CM

## 2019-10-15 DIAGNOSIS — J44.9 CHRONIC OBSTRUCTIVE PULMONARY DISEASE, UNSPECIFIED COPD TYPE (HCC): Primary | ICD-10-CM

## 2019-10-15 LAB
BASOPHILS # BLD AUTO: 0.04 10*3/MM3 (ref 0–0.2)
BASOPHILS NFR BLD AUTO: 0.3 % (ref 0–1.5)
DEPRECATED RDW RBC AUTO: 50.6 FL (ref 37–54)
EOSINOPHIL # BLD AUTO: 0.03 10*3/MM3 (ref 0–0.4)
EOSINOPHIL NFR BLD AUTO: 0.2 % (ref 0.3–6.2)
ERYTHROCYTE [DISTWIDTH] IN BLOOD BY AUTOMATED COUNT: 13.4 % (ref 12.3–15.4)
HCT VFR BLD AUTO: 39.9 % (ref 37.5–51)
HGB BLD-MCNC: 12.7 G/DL (ref 13–17.7)
IMM GRANULOCYTES # BLD AUTO: 0.06 10*3/MM3 (ref 0–0.05)
IMM GRANULOCYTES NFR BLD AUTO: 0.5 % (ref 0–0.5)
LYMPHOCYTES # BLD AUTO: 1.22 10*3/MM3 (ref 0.7–3.1)
LYMPHOCYTES NFR BLD AUTO: 9.6 % (ref 19.6–45.3)
MCH RBC QN AUTO: 32.3 PG (ref 26.6–33)
MCHC RBC AUTO-ENTMCNC: 31.8 G/DL (ref 31.5–35.7)
MCV RBC AUTO: 101.5 FL (ref 79–97)
MONOCYTES # BLD AUTO: 0.07 10*3/MM3 (ref 0.1–0.9)
MONOCYTES NFR BLD AUTO: 0.5 % (ref 5–12)
NEUTROPHILS # BLD AUTO: 11.31 10*3/MM3 (ref 1.7–7)
NEUTROPHILS NFR BLD AUTO: 88.9 % (ref 42.7–76)
NRBC BLD AUTO-RTO: 0 /100 WBC (ref 0–0.2)
PLATELET # BLD AUTO: 271 10*3/MM3 (ref 140–450)
PMV BLD AUTO: 9.3 FL (ref 6–12)
RBC # BLD AUTO: 3.93 10*6/MM3 (ref 4.14–5.8)
WBC NRBC COR # BLD: 12.73 10*3/MM3 (ref 3.4–10.8)

## 2019-10-15 PROCEDURE — 25010000002 ONDANSETRON PER 1 MG: Performed by: NURSE ANESTHETIST, CERTIFIED REGISTERED

## 2019-10-15 PROCEDURE — C1713 ANCHOR/SCREW BN/BN,TIS/BN: HCPCS | Performed by: ORTHOPAEDIC SURGERY

## 2019-10-15 PROCEDURE — 25010000002 VANCOMYCIN PER 500 MG: Performed by: ORTHOPAEDIC SURGERY

## 2019-10-15 PROCEDURE — 94799 UNLISTED PULMONARY SVC/PX: CPT

## 2019-10-15 PROCEDURE — 85025 COMPLETE CBC W/AUTO DIFF WBC: CPT | Performed by: ORTHOPAEDIC SURGERY

## 2019-10-15 PROCEDURE — G0378 HOSPITAL OBSERVATION PER HR: HCPCS

## 2019-10-15 PROCEDURE — 97165 OT EVAL LOW COMPLEX 30 MIN: CPT

## 2019-10-15 PROCEDURE — 25010000002 ROPIVACAINE PER 1 MG: Performed by: NURSE ANESTHETIST, CERTIFIED REGISTERED

## 2019-10-15 PROCEDURE — 27447 TOTAL KNEE ARTHROPLASTY: CPT | Performed by: ORTHOPAEDIC SURGERY

## 2019-10-15 PROCEDURE — 97161 PT EVAL LOW COMPLEX 20 MIN: CPT

## 2019-10-15 PROCEDURE — C1776 JOINT DEVICE (IMPLANTABLE): HCPCS | Performed by: ORTHOPAEDIC SURGERY

## 2019-10-15 PROCEDURE — 20985 CPTR-ASST DIR MS PX: CPT | Performed by: ORTHOPAEDIC SURGERY

## 2019-10-15 PROCEDURE — 94640 AIRWAY INHALATION TREATMENT: CPT

## 2019-10-15 PROCEDURE — 25010000002 PROPOFOL 10 MG/ML EMULSION: Performed by: NURSE ANESTHETIST, CERTIFIED REGISTERED

## 2019-10-15 PROCEDURE — 25010000002 DEXAMETHASONE PER 1 MG: Performed by: NURSE ANESTHETIST, CERTIFIED REGISTERED

## 2019-10-15 PROCEDURE — 25010000003 BUPIVACAINE LIPOSOME 1.3 % SUSPENSION 20 ML VIAL: Performed by: ORTHOPAEDIC SURGERY

## 2019-10-15 PROCEDURE — 25010000002 MIDAZOLAM PER 1 MG: Performed by: NURSE ANESTHETIST, CERTIFIED REGISTERED

## 2019-10-15 PROCEDURE — 25010000002 VANCOMYCIN 1 G RECONSTITUTED SOLUTION 1 EACH VIAL: Performed by: ORTHOPAEDIC SURGERY

## 2019-10-15 PROCEDURE — 73560 X-RAY EXAM OF KNEE 1 OR 2: CPT

## 2019-10-15 PROCEDURE — 99219 PR INITIAL OBSERVATION CARE/DAY 50 MINUTES: CPT | Performed by: NURSE PRACTITIONER

## 2019-10-15 PROCEDURE — L1830 KO IMMOB CANVAS LONG PRE OTS: HCPCS | Performed by: ORTHOPAEDIC SURGERY

## 2019-10-15 PROCEDURE — C9290 INJ, BUPIVACAINE LIPOSOME: HCPCS | Performed by: ORTHOPAEDIC SURGERY

## 2019-10-15 DEVICE — BASEPLT TIB UNIV CMTLS SZ6: Type: IMPLANTABLE DEVICE | Site: KNEE | Status: FUNCTIONAL

## 2019-10-15 DEVICE — IMPLANTABLE DEVICE: Type: IMPLANTABLE DEVICE | Site: KNEE | Status: FUNCTIONAL

## 2019-10-15 DEVICE — INSRT TIB/KN TRIATHLON PS X3 NMBR6 9MM: Type: IMPLANTABLE DEVICE | Site: KNEE | Status: FUNCTIONAL

## 2019-10-15 DEVICE — TOTL KN HI DEMAND STRYKER: Type: IMPLANTABLE DEVICE | Site: KNEE | Status: FUNCTIONAL

## 2019-10-15 DEVICE — COMP FEM TRIATH PS CMT NO5 LT: Type: IMPLANTABLE DEVICE | Site: KNEE | Status: FUNCTIONAL

## 2019-10-15 DEVICE — STEM FEM TRIATH CMT 12X50MM: Type: IMPLANTABLE DEVICE | Site: KNEE | Status: FUNCTIONAL

## 2019-10-15 DEVICE — SMARTSET GMV HIGH PERFORMANCE GENTAMICIN MEDIUM VISCOSITY BONE CEMENT 40G
Type: IMPLANTABLE DEVICE | Site: KNEE | Status: FUNCTIONAL
Brand: SMARTSET

## 2019-10-15 RX ORDER — ONDANSETRON 2 MG/ML
4 INJECTION INTRAMUSCULAR; INTRAVENOUS ONCE AS NEEDED
Status: DISCONTINUED | OUTPATIENT
Start: 2019-10-15 | End: 2019-10-15 | Stop reason: HOSPADM

## 2019-10-15 RX ORDER — BUPRENORPHINE 2 MG/1
TABLET SUBLINGUAL
Status: DISPENSED
Start: 2019-10-15 | End: 2019-10-16

## 2019-10-15 RX ORDER — SODIUM CHLORIDE 0.9 % (FLUSH) 0.9 %
1-10 SYRINGE (ML) INJECTION AS NEEDED
Status: DISCONTINUED | OUTPATIENT
Start: 2019-10-15 | End: 2019-10-15

## 2019-10-15 RX ORDER — ONDANSETRON 4 MG/1
4 TABLET, FILM COATED ORAL EVERY 6 HOURS PRN
Status: DISCONTINUED | OUTPATIENT
Start: 2019-10-15 | End: 2019-10-17 | Stop reason: HOSPADM

## 2019-10-15 RX ORDER — ROPIVACAINE HYDROCHLORIDE 5 MG/ML
INJECTION, SOLUTION EPIDURAL; INFILTRATION; PERINEURAL AS NEEDED
Status: DISCONTINUED | OUTPATIENT
Start: 2019-10-15 | End: 2019-10-15 | Stop reason: SURG

## 2019-10-15 RX ORDER — NICOTINE 21 MG/24HR
1 PATCH, TRANSDERMAL 24 HOURS TRANSDERMAL
Status: DISCONTINUED | OUTPATIENT
Start: 2019-10-15 | End: 2019-10-17 | Stop reason: HOSPADM

## 2019-10-15 RX ORDER — ALBUTEROL SULFATE 2.5 MG/3ML
2.5 SOLUTION RESPIRATORY (INHALATION) EVERY 6 HOURS PRN
Status: DISCONTINUED | OUTPATIENT
Start: 2019-10-15 | End: 2019-10-17 | Stop reason: HOSPADM

## 2019-10-15 RX ORDER — SODIUM CHLORIDE, SODIUM LACTATE, POTASSIUM CHLORIDE, CALCIUM CHLORIDE 600; 310; 30; 20 MG/100ML; MG/100ML; MG/100ML; MG/100ML
50 INJECTION, SOLUTION INTRAVENOUS CONTINUOUS
Status: DISCONTINUED | OUTPATIENT
Start: 2019-10-15 | End: 2019-10-15

## 2019-10-15 RX ORDER — KETAMINE HYDROCHLORIDE 10 MG/ML
INJECTION INTRAMUSCULAR; INTRAVENOUS AS NEEDED
Status: DISCONTINUED | OUTPATIENT
Start: 2019-10-15 | End: 2019-10-15 | Stop reason: SURG

## 2019-10-15 RX ORDER — ALBUTEROL SULFATE 90 UG/1
2 AEROSOL, METERED RESPIRATORY (INHALATION)
Status: DISCONTINUED | OUTPATIENT
Start: 2019-10-15 | End: 2019-10-15 | Stop reason: CLARIF

## 2019-10-15 RX ORDER — VELPATASVIR AND SOFOSBUVIR 100; 400 MG/1; MG/1
1 TABLET, FILM COATED ORAL EVERY 24 HOURS
Status: DISCONTINUED | OUTPATIENT
Start: 2019-10-15 | End: 2019-10-17 | Stop reason: HOSPADM

## 2019-10-15 RX ORDER — SODIUM CHLORIDE, SODIUM LACTATE, POTASSIUM CHLORIDE, CALCIUM CHLORIDE 600; 310; 30; 20 MG/100ML; MG/100ML; MG/100ML; MG/100ML
100 INJECTION, SOLUTION INTRAVENOUS CONTINUOUS
Status: DISCONTINUED | OUTPATIENT
Start: 2019-10-15 | End: 2019-10-15

## 2019-10-15 RX ORDER — CYCLOBENZAPRINE HCL 10 MG
10 TABLET ORAL 3 TIMES DAILY
Status: DISCONTINUED | OUTPATIENT
Start: 2019-10-15 | End: 2019-10-17 | Stop reason: HOSPADM

## 2019-10-15 RX ORDER — DEXAMETHASONE SODIUM PHOSPHATE 4 MG/ML
8 INJECTION, SOLUTION INTRA-ARTICULAR; INTRALESIONAL; INTRAMUSCULAR; INTRAVENOUS; SOFT TISSUE ONCE
Status: COMPLETED | OUTPATIENT
Start: 2019-10-15 | End: 2019-10-15

## 2019-10-15 RX ORDER — LIDOCAINE HYDROCHLORIDE 10 MG/ML
0.5 INJECTION, SOLUTION EPIDURAL; INFILTRATION; INTRACAUDAL; PERINEURAL ONCE AS NEEDED
Status: COMPLETED | OUTPATIENT
Start: 2019-10-15 | End: 2019-10-15

## 2019-10-15 RX ORDER — ESCITALOPRAM OXALATE 10 MG/1
20 TABLET ORAL DAILY
Status: DISCONTINUED | OUTPATIENT
Start: 2019-10-15 | End: 2019-10-17 | Stop reason: HOSPADM

## 2019-10-15 RX ORDER — SODIUM CHLORIDE 9 MG/ML
INJECTION, SOLUTION INTRAVENOUS AS NEEDED
Status: DISCONTINUED | OUTPATIENT
Start: 2019-10-15 | End: 2019-10-15 | Stop reason: HOSPADM

## 2019-10-15 RX ORDER — ONDANSETRON 2 MG/ML
4 INJECTION INTRAMUSCULAR; INTRAVENOUS EVERY 6 HOURS PRN
Status: DISCONTINUED | OUTPATIENT
Start: 2019-10-15 | End: 2019-10-17 | Stop reason: HOSPADM

## 2019-10-15 RX ORDER — BUSPIRONE HYDROCHLORIDE 5 MG/1
10 TABLET ORAL 3 TIMES DAILY PRN
Status: DISCONTINUED | OUTPATIENT
Start: 2019-10-15 | End: 2019-10-17 | Stop reason: HOSPADM

## 2019-10-15 RX ORDER — SODIUM CHLORIDE, SODIUM LACTATE, POTASSIUM CHLORIDE, CALCIUM CHLORIDE 600; 310; 30; 20 MG/100ML; MG/100ML; MG/100ML; MG/100ML
9 INJECTION, SOLUTION INTRAVENOUS CONTINUOUS
Status: DISCONTINUED | OUTPATIENT
Start: 2019-10-15 | End: 2019-10-17 | Stop reason: HOSPADM

## 2019-10-15 RX ORDER — BUPRENORPHINE 2 MG/1
4 TABLET SUBLINGUAL ONCE
Status: COMPLETED | OUTPATIENT
Start: 2019-10-15 | End: 2019-10-15

## 2019-10-15 RX ORDER — SODIUM CHLORIDE 0.9 % (FLUSH) 0.9 %
3 SYRINGE (ML) INJECTION EVERY 12 HOURS SCHEDULED
Status: DISCONTINUED | OUTPATIENT
Start: 2019-10-15 | End: 2019-10-15

## 2019-10-15 RX ORDER — ACETAMINOPHEN 500 MG
1000 TABLET ORAL ONCE
Status: COMPLETED | OUTPATIENT
Start: 2019-10-15 | End: 2019-10-15

## 2019-10-15 RX ORDER — LEVOTHYROXINE SODIUM 0.03 MG/1
25 TABLET ORAL EVERY MORNING
Status: DISCONTINUED | OUTPATIENT
Start: 2019-10-16 | End: 2019-10-17 | Stop reason: HOSPADM

## 2019-10-15 RX ORDER — PREGABALIN 75 MG/1
150 CAPSULE ORAL ONCE
Status: COMPLETED | OUTPATIENT
Start: 2019-10-15 | End: 2019-10-15

## 2019-10-15 RX ORDER — MAGNESIUM HYDROXIDE 1200 MG/15ML
LIQUID ORAL AS NEEDED
Status: DISCONTINUED | OUTPATIENT
Start: 2019-10-15 | End: 2019-10-15 | Stop reason: HOSPADM

## 2019-10-15 RX ORDER — LANOLIN ALCOHOL/MO/W.PET/CERES
1000 CREAM (GRAM) TOPICAL DAILY
Status: DISCONTINUED | OUTPATIENT
Start: 2019-10-16 | End: 2019-10-17 | Stop reason: HOSPADM

## 2019-10-15 RX ORDER — ROPIVACAINE HYDROCHLORIDE 2 MG/ML
INJECTION, SOLUTION EPIDURAL; INFILTRATION; PERINEURAL
Status: COMPLETED | OUTPATIENT
Start: 2019-10-15 | End: 2019-10-15

## 2019-10-15 RX ORDER — LIDOCAINE HYDROCHLORIDE 20 MG/ML
INJECTION, SOLUTION INFILTRATION; PERINEURAL AS NEEDED
Status: DISCONTINUED | OUTPATIENT
Start: 2019-10-15 | End: 2019-10-15 | Stop reason: SURG

## 2019-10-15 RX ORDER — BACITRACIN ZINC 500 [USP'U]/G
OINTMENT TOPICAL AS NEEDED
Status: DISCONTINUED | OUTPATIENT
Start: 2019-10-15 | End: 2019-10-15 | Stop reason: HOSPADM

## 2019-10-15 RX ORDER — BUPRENORPHINE 2 MG/1
4 TABLET SUBLINGUAL 4 TIMES DAILY
Status: DISCONTINUED | OUTPATIENT
Start: 2019-10-15 | End: 2019-10-17 | Stop reason: HOSPADM

## 2019-10-15 RX ORDER — MULTIPLE VITAMINS W/ MINERALS TAB 9MG-400MCG
1 TAB ORAL DAILY
Status: DISCONTINUED | OUTPATIENT
Start: 2019-10-15 | End: 2019-10-17 | Stop reason: HOSPADM

## 2019-10-15 RX ORDER — SODIUM CHLORIDE 9 MG/ML
40 INJECTION, SOLUTION INTRAVENOUS AS NEEDED
Status: DISCONTINUED | OUTPATIENT
Start: 2019-10-15 | End: 2019-10-15

## 2019-10-15 RX ORDER — ACETAMINOPHEN 500 MG
1000 TABLET ORAL EVERY 6 HOURS
Status: DISCONTINUED | OUTPATIENT
Start: 2019-10-15 | End: 2019-10-17 | Stop reason: HOSPADM

## 2019-10-15 RX ORDER — ALBUTEROL SULFATE 2.5 MG/3ML
2.5 SOLUTION RESPIRATORY (INHALATION)
Status: DISCONTINUED | OUTPATIENT
Start: 2019-10-15 | End: 2019-10-16

## 2019-10-15 RX ORDER — SCOLOPAMINE TRANSDERMAL SYSTEM 1 MG/1
1 PATCH, EXTENDED RELEASE TRANSDERMAL CONTINUOUS
Status: ACTIVE | OUTPATIENT
Start: 2019-10-15 | End: 2019-10-16

## 2019-10-15 RX ORDER — MIDAZOLAM HYDROCHLORIDE 1 MG/ML
1 INJECTION INTRAMUSCULAR; INTRAVENOUS ONCE
Status: COMPLETED | OUTPATIENT
Start: 2019-10-15 | End: 2019-10-15

## 2019-10-15 RX ORDER — GABAPENTIN 400 MG/1
800 CAPSULE ORAL EVERY 8 HOURS SCHEDULED
Status: DISCONTINUED | OUTPATIENT
Start: 2019-10-15 | End: 2019-10-17 | Stop reason: HOSPADM

## 2019-10-15 RX ORDER — PROPOFOL 10 MG/ML
VIAL (ML) INTRAVENOUS CONTINUOUS PRN
Status: DISCONTINUED | OUTPATIENT
Start: 2019-10-15 | End: 2019-10-15 | Stop reason: SURG

## 2019-10-15 RX ORDER — PROPOFOL 10 MG/ML
VIAL (ML) INTRAVENOUS AS NEEDED
Status: DISCONTINUED | OUTPATIENT
Start: 2019-10-15 | End: 2019-10-15 | Stop reason: SURG

## 2019-10-15 RX ORDER — ONDANSETRON 2 MG/ML
4 INJECTION INTRAMUSCULAR; INTRAVENOUS ONCE AS NEEDED
Status: COMPLETED | OUTPATIENT
Start: 2019-10-15 | End: 2019-10-15

## 2019-10-15 RX ORDER — BUPIVACAINE HYDROCHLORIDE 5 MG/ML
INJECTION, SOLUTION EPIDURAL; INTRACAUDAL AS NEEDED
Status: DISCONTINUED | OUTPATIENT
Start: 2019-10-15 | End: 2019-10-15 | Stop reason: SURG

## 2019-10-15 RX ADMIN — ALBUTEROL SULFATE 2.5 MG: 2.5 SOLUTION RESPIRATORY (INHALATION) at 16:47

## 2019-10-15 RX ADMIN — BUPRENORPHINE HCL 4 MG: 2 TABLET SUBLINGUAL at 20:51

## 2019-10-15 RX ADMIN — ROPIVACAINE HYDROCHLORIDE 10 ML: 2 INJECTION, SOLUTION EPIDURAL; INFILTRATION at 12:07

## 2019-10-15 RX ADMIN — SCOPALAMINE 1 PATCH: 1 PATCH, EXTENDED RELEASE TRANSDERMAL at 07:23

## 2019-10-15 RX ADMIN — ACETAMINOPHEN 1000 MG: 500 TABLET, FILM COATED ORAL at 20:35

## 2019-10-15 RX ADMIN — ESCITALOPRAM OXALATE 20 MG: 10 TABLET, FILM COATED ORAL at 14:23

## 2019-10-15 RX ADMIN — ROPIVACAINE HYDROCHLORIDE 10 ML: 2 INJECTION, SOLUTION EPIDURAL; INFILTRATION at 12:35

## 2019-10-15 RX ADMIN — VELPATASVIR AND SOFOSBUVIR 1 TABLET: 100; 400 TABLET, FILM COATED ORAL at 15:13

## 2019-10-15 RX ADMIN — GABAPENTIN 800 MG: 400 CAPSULE ORAL at 22:27

## 2019-10-15 RX ADMIN — VANCOMYCIN HYDROCHLORIDE 1500 MG: 1 INJECTION, POWDER, LYOPHILIZED, FOR SOLUTION INTRAVENOUS at 06:30

## 2019-10-15 RX ADMIN — LIDOCAINE HYDROCHLORIDE 100 MG: 20 INJECTION, SOLUTION INFILTRATION; PERINEURAL at 10:11

## 2019-10-15 RX ADMIN — SODIUM CHLORIDE 1000 MG: 9 INJECTION, SOLUTION INTRAVENOUS at 10:32

## 2019-10-15 RX ADMIN — ONDANSETRON 4 MG: 2 INJECTION, SOLUTION INTRAMUSCULAR; INTRAVENOUS at 07:23

## 2019-10-15 RX ADMIN — PROPOFOL 20 MG: 10 INJECTION, EMULSION INTRAVENOUS at 08:14

## 2019-10-15 RX ADMIN — KETAMINE HYDROCHLORIDE 20 MG: 10 INJECTION INTRAMUSCULAR; INTRAVENOUS at 08:05

## 2019-10-15 RX ADMIN — GABAPENTIN 800 MG: 400 CAPSULE ORAL at 14:23

## 2019-10-15 RX ADMIN — LIDOCAINE HYDROCHLORIDE 100 MG: 20 INJECTION, SOLUTION INFILTRATION; PERINEURAL at 08:00

## 2019-10-15 RX ADMIN — ALBUTEROL SULFATE 2.5 MG: 2.5 SOLUTION RESPIRATORY (INHALATION) at 21:14

## 2019-10-15 RX ADMIN — NICOTINE 1 PATCH: 21 PATCH TRANSDERMAL at 15:13

## 2019-10-15 RX ADMIN — CYCLOBENZAPRINE HYDROCHLORIDE 10 MG: 10 TABLET, FILM COATED ORAL at 15:13

## 2019-10-15 RX ADMIN — PROPOFOL 200 MG: 10 INJECTION, EMULSION INTRAVENOUS at 10:27

## 2019-10-15 RX ADMIN — KETAMINE HYDROCHLORIDE 30 MG: 10 INJECTION INTRAMUSCULAR; INTRAVENOUS at 10:18

## 2019-10-15 RX ADMIN — BUPRENORPHINE HCL 4 MG: 2 TABLET SUBLINGUAL at 18:04

## 2019-10-15 RX ADMIN — FAMOTIDINE 20 MG: 10 INJECTION, SOLUTION INTRAVENOUS at 07:23

## 2019-10-15 RX ADMIN — MIDAZOLAM HYDROCHLORIDE 1 MG: 1 INJECTION, SOLUTION INTRAMUSCULAR; INTRAVENOUS at 12:05

## 2019-10-15 RX ADMIN — DEXAMETHASONE SODIUM PHOSPHATE 8 MG: 4 INJECTION, SOLUTION INTRAMUSCULAR; INTRAVENOUS at 07:23

## 2019-10-15 RX ADMIN — ROPIVACAINE HYDROCHLORIDE 20 ML: 5 INJECTION, SOLUTION EPIDURAL; INFILTRATION; PERINEURAL at 07:46

## 2019-10-15 RX ADMIN — Medication 1 TABLET: at 17:54

## 2019-10-15 RX ADMIN — BUPIVACAINE HYDROCHLORIDE 2 ML: 5 INJECTION, SOLUTION EPIDURAL; INTRACAUDAL; PERINEURAL at 07:53

## 2019-10-15 RX ADMIN — PROPOFOL 25 MCG/KG/MIN: 10 INJECTION, EMULSION INTRAVENOUS at 08:02

## 2019-10-15 RX ADMIN — BUPRENORPHINE HCL 4 MG: 2 TABLET SUBLINGUAL at 14:23

## 2019-10-15 RX ADMIN — SODIUM CHLORIDE, POTASSIUM CHLORIDE, SODIUM LACTATE AND CALCIUM CHLORIDE 9 ML/HR: 600; 310; 30; 20 INJECTION, SOLUTION INTRAVENOUS at 06:25

## 2019-10-15 RX ADMIN — KETAMINE HYDROCHLORIDE 20 MG: 10 INJECTION INTRAMUSCULAR; INTRAVENOUS at 10:11

## 2019-10-15 RX ADMIN — LIDOCAINE HYDROCHLORIDE 0.5 ML: 10 INJECTION, SOLUTION EPIDURAL; INFILTRATION; INTRACAUDAL; PERINEURAL at 06:25

## 2019-10-15 RX ADMIN — PROPOFOL 20 MG: 10 INJECTION, EMULSION INTRAVENOUS at 08:20

## 2019-10-15 RX ADMIN — ACETAMINOPHEN 1000 MG: 500 TABLET, FILM COATED ORAL at 14:23

## 2019-10-15 RX ADMIN — PROPOFOL 10 MG: 10 INJECTION, EMULSION INTRAVENOUS at 10:11

## 2019-10-15 RX ADMIN — PREGABALIN 150 MG: 75 CAPSULE ORAL at 06:31

## 2019-10-15 RX ADMIN — ACETAMINOPHEN 1000 MG: 500 TABLET, FILM COATED ORAL at 06:31

## 2019-10-15 RX ADMIN — KETAMINE HYDROCHLORIDE 10 MG: 10 INJECTION INTRAMUSCULAR; INTRAVENOUS at 12:05

## 2019-10-15 RX ADMIN — CYCLOBENZAPRINE HYDROCHLORIDE 10 MG: 10 TABLET, FILM COATED ORAL at 20:35

## 2019-10-15 RX ADMIN — VANCOMYCIN HYDROCHLORIDE 1500 MG: 500 INJECTION, POWDER, LYOPHILIZED, FOR SOLUTION INTRAVENOUS at 17:53

## 2019-10-15 RX ADMIN — SODIUM CHLORIDE 1000 MG: 9 INJECTION, SOLUTION INTRAVENOUS at 08:06

## 2019-10-15 RX ADMIN — PROPOFOL 50 MG: 10 INJECTION, EMULSION INTRAVENOUS at 08:02

## 2019-10-15 NOTE — ANESTHESIA PROCEDURE NOTES
Peripheral Block      Patient reassessed immediately prior to procedure    Patient location during procedure: post-op  Start time: 10/15/2019 12:04 PM  Stop time: 10/15/2019 12:07 PM  Reason for block: at surgeon's request and post-op pain management  Performed by  CRNA: Joi Fernandez CRNA  Preanesthetic Checklist  Completed: patient identified, site marked, surgical consent, pre-op evaluation, timeout performed, IV checked, risks and benefits discussed and monitors and equipment checked  Prep:  Pt Position: right lateral decubitus  Sterile barriers:cap, gloves, mask and sterile barriers  Prep: ChloraPrep  Patient monitoring: blood pressure monitoring, continuous pulse oximetry and EKG  Procedure  Sedation:yes  Performed under: PNB  Guidance:nerve stimulator  Images:still images not obtained  Loss of twitch: 0.5 mA  Laterality:left  Block Type:sciatic  Injection Technique:single-shot  Needle Type:echogenic  Needle Gauge:21 G  Resistance on Injection: none    Medications Used: ropivacaine (NAROPIN) 0.2% injection, 10 mL  Med admintered at 10/15/2019 12:07 PM      Medications  Preservative Free Saline:5ml    Post Assessment  Injection Assessment: negative aspiration for heme, no paresthesia on injection and incremental injection  Patient Tolerance:comfortable throughout block  Complications:no

## 2019-10-15 NOTE — ANESTHESIA PROCEDURE NOTES
Peripheral Block      Patient reassessed immediately prior to procedure    Patient location during procedure: post-op  Start time: 10/15/2019 12:31 PM  Stop time: 10/15/2019 12:35 PM  Reason for block: at surgeon's request and post-op pain management  Performed by  CRNA: Joi Fernandez CRNA  Preanesthetic Checklist  Completed: patient identified, site marked, surgical consent, pre-op evaluation, timeout performed, IV checked, risks and benefits discussed and monitors and equipment checked  Prep:  Pt Position: supine  Sterile barriers:cap, gloves, mask and sterile barriers  Prep: ChloraPrep  Patient monitoring: blood pressure monitoring, continuous pulse oximetry and EKG  Procedure  Sedation:no  Performed under: PNB  Guidance:ultrasound guided  ULTRASOUND INTERPRETATION.  Using ultrasound guidance a 21 G gauge needle was placed in close proximity to the femoral nerve, at which point, under ultrasound guidance anesthetic was injected in the area of the nerve and spread of the anesthesia was seen on ultrasound in close proximity thereto.  There were no abnormalities seen on ultrasound; a digital image was taken; and the patient tolerated the procedure with no complications. Images:still images obtained, printed/placed on chart    Laterality:left  Block Type:femoral  Injection Technique:single-shot  Needle Type:echogenic  Needle Gauge:21 G  Resistance on Injection: none    Medications Used: ropivacaine (NAROPIN) 0.2% injection, 10 mL  Med admintered at 10/15/2019 12:35 PM      Medications  Preservative Free Saline:5ml    Post Assessment  Injection Assessment: negative aspiration for heme, no paresthesia on injection and incremental injection  Patient Tolerance:comfortable throughout block  Complications:no

## 2019-10-15 NOTE — ANESTHESIA PROCEDURE NOTES
Peripheral Block      Patient reassessed immediately prior to procedure    Patient location during procedure: pre-op  Start time: 10/15/2019 7:35 AM  Stop time: 10/15/2019 7:46 AM  Reason for block: at surgeon's request and post-op pain management  Performed by  CRNA: Viridiana Mullen CRNA  Preanesthetic Checklist  Completed: patient identified, site marked, surgical consent, pre-op evaluation, timeout performed, IV checked, risks and benefits discussed and monitors and equipment checked  Prep:  Pt Position: supine  Sterile barriers:cap and gloves  Prep: ChloraPrep  Patient monitoring: blood pressure monitoring, continuous pulse oximetry and EKG  Procedure  Sedation:no    Guidance:ultrasound guided and landmark technique  Images:still images obtained, printed/placed on chart    Laterality:left  Block Type:adductor canal block  Injection Technique:single-shot  Needle Type:echogenic  Needle Gauge:21 G  Resistance on Injection: none          Post Assessment  Injection Assessment: negative aspiration for heme, no paresthesia on injection and incremental injection  Patient Tolerance:comfortable throughout block  Complications:no  Additional Notes  LIDOCAINE 1% SKIN INFILTRATION 1ML   ROPIVACAINE 0.5% 5ML AT THE

## 2019-10-15 NOTE — ANESTHESIA POSTPROCEDURE EVALUATION
Patient: Gasper Greene    Procedure Summary     Date:  10/15/19 Room / Location:   [facility] OR 3 / [facility]    Anesthesia Start:  0759 Anesthesia Stop:  1143    Procedure:  LEFT  TOTAL KNEE ARTHROPLASTY (Left Knee) Diagnosis:       Primary osteoarthritis of left knee      (Primary osteoarthritis of left knee [M17.12])    Surgeon:  Bob Curry MD Provider:  Viridiana Mullen CRNA    Anesthesia Type:  MAC, spinal, regional ASA Status:  3          Anesthesia Type: MAC, spinal, regional  Last vitals  BP   119/77 (10/15/19 1300)   Temp   98 °F (36.7 °C) (10/15/19 1156)   Pulse   65 (10/15/19 1300)   Resp   12 (10/15/19 1240)     SpO2   99 % (10/15/19 1300)     Post Anesthesia Care and Evaluation    Patient location during evaluation: bedside  Patient participation: complete - patient participated  Level of consciousness: awake  Pain score: 3  Pain management: adequate  Airway patency: patent  Anesthetic complications: No anesthetic complications  PONV Status: none  Cardiovascular status: acceptable  Respiratory status: acceptable  Hydration status: acceptable    Comments: Patient required 2 rescue PNB in post op recovery. Pain was 9-10/10 in recovery prior to blocks. Was 3-5 after blocks. Patient sitting upright, awake, talking, drinking fluids and in no distress. VSS.   Becoming belligerent with staff about needing suboxone NOW.   Stable condition. Will transfer to floor.

## 2019-10-15 NOTE — ANESTHESIA PREPROCEDURE EVALUATION
Anesthesia Evaluation     Patient summary reviewed and Nursing notes reviewed   no history of anesthetic complications:  NPO Solid Status: > 8 hours  NPO Liquid Status: > 2 hours           Airway   Mallampati: II  TM distance: >3 FB  Neck ROM: full  No difficulty expected  Dental      Pulmonary    (+) a smoker Current Smoked day of surgery, recent URI (2 weeks ago) resolved, sleep apnea (pt noncompliant with cpap), decreased breath sounds,   Cardiovascular   Exercise tolerance: good (4-7 METS)    ECG reviewed  PT is on anticoagulation therapy  Rhythm: regular  Rate: normal    (+) past MI  >12 months, CAD, cardiac stents (sept 2017) more than 12 months ago hyperlipidemia,     ROS comment: Narrative     HEART RATE= 69  bpm  RR Interval= 876  ms  AR Interval= 166  ms  P Horizontal Axis=   deg  P Front Axis= 46  deg  QRSD Interval= 103  ms  QT Interval= 423  ms  QRS Axis= 9  deg  T Wave Axis= -52  deg  - ABNORMAL ECG -  Sinus rhythm  Inferior infarct, age indeterminate  NO PRIOR TRACING AVAILABLE FOR COMPARISON  Electronically Signed By: Garrett Gaytan ([facility]) 17-Sep-2019 20:47:15  Date and Time of Study: 2019-09-17 15:26:40        Neuro/Psych  (+) numbness (sai feet), psychiatric history Anxiety,     GI/Hepatic/Renal/Endo    (+) obesity,  GERD poorly controlled, PUD (abdominal repair >8yrs ago), GI bleeding, hepatitis C, liver disease, hypothyroidism,     Musculoskeletal     Abdominal   (+) obese,    Substance History   (+) alcohol use (heavy drinker hx, quit 2000), drug use (quit 1yr ago pt on suboxone )      Comment: suboxone taken today   OB/GYN          Other   (+) arthritis     ROS/Med Hx Other: 0518 finished CL                  Anesthesia Plan    ASA 3     MAC, spinal and regional     intravenous induction   Anesthetic plan, all risks, benefits, and alternatives have been provided, discussed and informed consent has been obtained with: patient.  Use of blood products discussed with patient  Consented to blood  products.

## 2019-10-15 NOTE — ANESTHESIA PROCEDURE NOTES
Spinal Block      Patient reassessed immediately prior to procedure    Patient location during procedure: pre-op  Start Time: 10/15/2019 7:52 AM  Stop Time: 10/15/2019 7:53 AM  Indication:at surgeon's request and procedure for pain  Performed By  CRNA: Viridiana Mullen CRNA  Preanesthetic Checklist  Completed: patient identified, site marked, surgical consent, pre-op evaluation, timeout performed, IV checked, risks and benefits discussed and monitors and equipment checked  Spinal Block Prep:  Patient Position:sitting  Sterile Tech:cap, gloves, mask and sterile barriers  Prep:Betadine  Patient Monitoring:blood pressure monitoring, continuous pulse oximetry and EKG  Spinal Block Procedure  Approach:midline  Guidance:landmark technique and palpation technique  Location:L3-L4  Needle Type:Pencan  Needle Gauge:27 G  Placement of Spinal needle event:cerebrospinal fluid aspirated  Paresthesia: no  Fluid Appearance:clear     Post Assessment  Patient Tolerance:patient tolerated the procedure well with no apparent complications  Complications no  Additional Notes  LIDOCAINE 2% SKIN INFILTRATION 1ML

## 2019-10-15 NOTE — ANESTHESIA POSTPROCEDURE EVALUATION
Patient: Gasper Greene    Procedure Summary     Date:  10/15/19 Room / Location:   [facility] /  [facility]    Anesthesia Start:  0759 Anesthesia Stop:  1143    Procedure:  LEFT  TOTAL KNEE ARTHROPLASTY (Left Knee) Diagnosis:       Primary osteoarthritis of left knee      (Primary osteoarthritis of left knee [M17.12])    Surgeon:  Bob Curry MD Provider:  Viridiana Mullen CRNA    Anesthesia Type:  MAC, spinal, regional ASA Status:  3          Anesthesia Type: MAC, spinal, regional  Last vitals  BP   119/77 (10/15/19 1300)   Temp   98 °F (36.7 °C) (10/15/19 1156)   Pulse   65 (10/15/19 1300)   Resp   12 (10/15/19 1240)     SpO2   99 % (10/15/19 1300)     Post Anesthesia Care and Evaluation    Patient location during evaluation: PACU  Patient participation: complete - patient participated  Level of consciousness: awake  Pain management: adequate  Airway patency: patent  Anesthetic complications: No anesthetic complications  PONV Status: none  Cardiovascular status: acceptable  Respiratory status: acceptable  Hydration status: acceptable

## 2019-10-15 NOTE — ADDENDUM NOTE
Addendum  created 10/15/19 1347 by Joi Fernandez, CRNA    Intraprocedure Flowsheets edited, Intraprocedure Meds edited

## 2019-10-16 ENCOUNTER — TELEPHONE (OUTPATIENT)
Dept: ORTHOPEDIC SURGERY | Facility: CLINIC | Age: 49
End: 2019-10-16

## 2019-10-16 LAB
APTT PPP: 29.3 SECONDS (ref 24.3–38.1)
BASOPHILS # BLD AUTO: 0.02 10*3/MM3 (ref 0–0.2)
BASOPHILS NFR BLD AUTO: 0.1 % (ref 0–1.5)
DEPRECATED RDW RBC AUTO: 50.1 FL (ref 37–54)
EOSINOPHIL # BLD AUTO: 0 10*3/MM3 (ref 0–0.4)
EOSINOPHIL NFR BLD AUTO: 0 % (ref 0.3–6.2)
ERYTHROCYTE [DISTWIDTH] IN BLOOD BY AUTOMATED COUNT: 13.7 % (ref 12.3–15.4)
HBV SURFACE AG SERPL QL IA: NORMAL
HCT VFR BLD AUTO: 34.1 % (ref 37.5–51)
HCV AB SER DONR QL: REACTIVE
HGB BLD-MCNC: 11 G/DL (ref 13–17.7)
HIV1 P24 AG SER QL: NORMAL
HIV1+2 AB SER QL: NEGATIVE
IMM GRANULOCYTES # BLD AUTO: 0.07 10*3/MM3 (ref 0–0.05)
IMM GRANULOCYTES NFR BLD AUTO: 0.5 % (ref 0–0.5)
LYMPHOCYTES # BLD AUTO: 2.42 10*3/MM3 (ref 0.7–3.1)
LYMPHOCYTES NFR BLD AUTO: 15.7 % (ref 19.6–45.3)
MCH RBC QN AUTO: 32.3 PG (ref 26.6–33)
MCHC RBC AUTO-ENTMCNC: 32.3 G/DL (ref 31.5–35.7)
MCV RBC AUTO: 100 FL (ref 79–97)
MONOCYTES # BLD AUTO: 1.55 10*3/MM3 (ref 0.1–0.9)
MONOCYTES NFR BLD AUTO: 10.1 % (ref 5–12)
NEUTROPHILS # BLD AUTO: 11.36 10*3/MM3 (ref 1.7–7)
NEUTROPHILS NFR BLD AUTO: 73.6 % (ref 42.7–76)
NRBC BLD AUTO-RTO: 0 /100 WBC (ref 0–0.2)
PLATELET # BLD AUTO: 252 10*3/MM3 (ref 140–450)
PMV BLD AUTO: 9.6 FL (ref 6–12)
RBC # BLD AUTO: 3.41 10*6/MM3 (ref 4.14–5.8)
WBC NRBC COR # BLD: 15.42 10*3/MM3 (ref 3.4–10.8)

## 2019-10-16 PROCEDURE — 86803 HEPATITIS C AB TEST: CPT | Performed by: ORTHOPAEDIC SURGERY

## 2019-10-16 PROCEDURE — G0432 EIA HIV-1/HIV-2 SCREEN: HCPCS | Performed by: ORTHOPAEDIC SURGERY

## 2019-10-16 PROCEDURE — 63710000001 PREDNISONE PER 1 MG: Performed by: ORTHOPAEDIC SURGERY

## 2019-10-16 PROCEDURE — 85025 COMPLETE CBC W/AUTO DIFF WBC: CPT | Performed by: ORTHOPAEDIC SURGERY

## 2019-10-16 PROCEDURE — 87521 HEPATITIS C PROBE&RVRS TRNSC: CPT | Performed by: ORTHOPAEDIC SURGERY

## 2019-10-16 PROCEDURE — 87340 HEPATITIS B SURFACE AG IA: CPT | Performed by: ORTHOPAEDIC SURGERY

## 2019-10-16 PROCEDURE — 94799 UNLISTED PULMONARY SVC/PX: CPT

## 2019-10-16 PROCEDURE — 99024 POSTOP FOLLOW-UP VISIT: CPT | Performed by: ORTHOPAEDIC SURGERY

## 2019-10-16 PROCEDURE — 97110 THERAPEUTIC EXERCISES: CPT

## 2019-10-16 PROCEDURE — 85730 THROMBOPLASTIN TIME PARTIAL: CPT | Performed by: ORTHOPAEDIC SURGERY

## 2019-10-16 PROCEDURE — G0378 HOSPITAL OBSERVATION PER HR: HCPCS

## 2019-10-16 PROCEDURE — 25010000002 METHYLPREDNISOLONE PER 40 MG

## 2019-10-16 PROCEDURE — 87899 AGENT NOS ASSAY W/OPTIC: CPT | Performed by: ORTHOPAEDIC SURGERY

## 2019-10-16 PROCEDURE — 99225 PR SBSQ OBSERVATION CARE/DAY 25 MINUTES: CPT | Performed by: NURSE PRACTITIONER

## 2019-10-16 RX ORDER — TRAMADOL HYDROCHLORIDE 50 MG/1
50 TABLET ORAL EVERY 4 HOURS PRN
Status: DISCONTINUED | OUTPATIENT
Start: 2019-10-16 | End: 2019-10-17 | Stop reason: HOSPADM

## 2019-10-16 RX ORDER — METHYLPREDNISOLONE SODIUM SUCCINATE 40 MG/ML
40 INJECTION, POWDER, LYOPHILIZED, FOR SOLUTION INTRAMUSCULAR; INTRAVENOUS ONCE
Status: COMPLETED | OUTPATIENT
Start: 2019-10-16 | End: 2019-10-16

## 2019-10-16 RX ORDER — ALBUTEROL SULFATE 2.5 MG/3ML
2.5 SOLUTION RESPIRATORY (INHALATION) EVERY 6 HOURS PRN
Status: DISCONTINUED | OUTPATIENT
Start: 2019-10-16 | End: 2019-10-17 | Stop reason: HOSPADM

## 2019-10-16 RX ORDER — METHYLPREDNISOLONE SODIUM SUCCINATE 40 MG/ML
INJECTION, POWDER, LYOPHILIZED, FOR SOLUTION INTRAMUSCULAR; INTRAVENOUS
Status: COMPLETED
Start: 2019-10-16 | End: 2019-10-16

## 2019-10-16 RX ORDER — PREDNISONE 20 MG/1
20 TABLET ORAL EVERY 8 HOURS SCHEDULED
Status: DISCONTINUED | OUTPATIENT
Start: 2019-10-16 | End: 2019-10-17 | Stop reason: HOSPADM

## 2019-10-16 RX ORDER — TRAMADOL HYDROCHLORIDE 50 MG/1
TABLET ORAL
Status: COMPLETED
Start: 2019-10-16 | End: 2019-10-16

## 2019-10-16 RX ADMIN — BUPRENORPHINE HCL 4 MG: 2 TABLET SUBLINGUAL at 21:51

## 2019-10-16 RX ADMIN — BUSPIRONE HYDROCHLORIDE 10 MG: 5 TABLET ORAL at 00:38

## 2019-10-16 RX ADMIN — CYCLOBENZAPRINE HYDROCHLORIDE 10 MG: 10 TABLET, FILM COATED ORAL at 09:51

## 2019-10-16 RX ADMIN — TRAMADOL HYDROCHLORIDE 50 MG: 50 TABLET, FILM COATED ORAL at 01:50

## 2019-10-16 RX ADMIN — Medication 1 TABLET: at 09:53

## 2019-10-16 RX ADMIN — LEVOTHYROXINE SODIUM 25 MCG: 25 TABLET ORAL at 06:07

## 2019-10-16 RX ADMIN — BUSPIRONE HYDROCHLORIDE 10 MG: 5 TABLET ORAL at 07:10

## 2019-10-16 RX ADMIN — VELPATASVIR AND SOFOSBUVIR 1 TABLET: 100; 400 TABLET, FILM COATED ORAL at 07:11

## 2019-10-16 RX ADMIN — RIVAROXABAN 10 MG: 10 TABLET, FILM COATED ORAL at 09:59

## 2019-10-16 RX ADMIN — ACETAMINOPHEN 1000 MG: 500 TABLET, FILM COATED ORAL at 20:50

## 2019-10-16 RX ADMIN — METHYLPREDNISOLONE SODIUM SUCCINATE 40 MG: 40 INJECTION, POWDER, FOR SOLUTION INTRAMUSCULAR; INTRAVENOUS at 07:15

## 2019-10-16 RX ADMIN — CYANOCOBALAMIN TAB 1000 MCG 1000 MCG: 1000 TAB at 09:59

## 2019-10-16 RX ADMIN — METHYLPREDNISOLONE SODIUM SUCCINATE 40 MG: 40 INJECTION, POWDER, LYOPHILIZED, FOR SOLUTION INTRAMUSCULAR; INTRAVENOUS at 07:15

## 2019-10-16 RX ADMIN — NICOTINE 1 PATCH: 21 PATCH TRANSDERMAL at 09:54

## 2019-10-16 RX ADMIN — GABAPENTIN 800 MG: 400 CAPSULE ORAL at 22:08

## 2019-10-16 RX ADMIN — PREDNISONE 20 MG: 20 TABLET ORAL at 22:09

## 2019-10-16 RX ADMIN — ESCITALOPRAM OXALATE 20 MG: 10 TABLET, FILM COATED ORAL at 07:11

## 2019-10-16 RX ADMIN — CYCLOBENZAPRINE HYDROCHLORIDE 10 MG: 10 TABLET, FILM COATED ORAL at 21:54

## 2019-10-16 RX ADMIN — ALBUTEROL SULFATE 2.5 MG: 2.5 SOLUTION RESPIRATORY (INHALATION) at 07:45

## 2019-10-16 RX ADMIN — ACETAMINOPHEN 1000 MG: 500 TABLET, FILM COATED ORAL at 14:58

## 2019-10-16 RX ADMIN — ACETAMINOPHEN 1000 MG: 500 TABLET, FILM COATED ORAL at 09:52

## 2019-10-16 RX ADMIN — TRAMADOL HYDROCHLORIDE 50 MG: 50 TABLET, FILM COATED ORAL at 09:51

## 2019-10-16 RX ADMIN — BUPRENORPHINE HCL 4 MG: 2 TABLET SUBLINGUAL at 17:18

## 2019-10-16 RX ADMIN — CYCLOBENZAPRINE HYDROCHLORIDE 10 MG: 10 TABLET, FILM COATED ORAL at 17:19

## 2019-10-16 RX ADMIN — ACETAMINOPHEN 1000 MG: 500 TABLET, FILM COATED ORAL at 01:32

## 2019-10-16 RX ADMIN — BUPRENORPHINE HCL 4 MG: 2 TABLET SUBLINGUAL at 12:29

## 2019-10-16 RX ADMIN — PREDNISONE 20 MG: 20 TABLET ORAL at 17:19

## 2019-10-16 RX ADMIN — GABAPENTIN 800 MG: 400 CAPSULE ORAL at 06:07

## 2019-10-16 RX ADMIN — BUPRENORPHINE HCL 4 MG: 2 TABLET SUBLINGUAL at 09:57

## 2019-10-16 RX ADMIN — GABAPENTIN 800 MG: 400 CAPSULE ORAL at 14:58

## 2019-10-16 NOTE — TELEPHONE ENCOUNTER
Xarelto 10mg-denied by [employer].     PA was attempted with Key#[id number] and it was also denied.     Abbie with Case management notified.     Please advise.

## 2019-10-17 ENCOUNTER — TELEPHONE (OUTPATIENT)
Dept: ORTHOPEDIC SURGERY | Facility: CLINIC | Age: 49
End: 2019-10-17

## 2019-10-17 VITALS
HEIGHT: 69 IN | OXYGEN SATURATION: 98 % | WEIGHT: 229.5 LBS | SYSTOLIC BLOOD PRESSURE: 138 MMHG | TEMPERATURE: 98.3 F | RESPIRATION RATE: 20 BRPM | HEART RATE: 94 BPM | DIASTOLIC BLOOD PRESSURE: 84 MMHG | BODY MASS INDEX: 33.99 KG/M2

## 2019-10-17 PROCEDURE — G0378 HOSPITAL OBSERVATION PER HR: HCPCS

## 2019-10-17 PROCEDURE — 63710000001 PREDNISONE PER 1 MG: Performed by: ORTHOPAEDIC SURGERY

## 2019-10-17 PROCEDURE — 97116 GAIT TRAINING THERAPY: CPT

## 2019-10-17 PROCEDURE — 99024 POSTOP FOLLOW-UP VISIT: CPT | Performed by: ORTHOPAEDIC SURGERY

## 2019-10-17 RX ORDER — DOXYCYCLINE HYCLATE 100 MG/1
100 CAPSULE ORAL 2 TIMES DAILY
Qty: 20 CAPSULE | Refills: 1 | Status: SHIPPED | OUTPATIENT
Start: 2019-10-17 | End: 2019-10-30

## 2019-10-17 RX ORDER — TRAMADOL HYDROCHLORIDE 50 MG/1
50 TABLET ORAL EVERY 4 HOURS PRN
Qty: 40 TABLET | Refills: 0 | Status: SHIPPED | OUTPATIENT
Start: 2019-10-17 | End: 2019-10-17

## 2019-10-17 RX ORDER — ASPIRIN 325 MG
325 TABLET ORAL EVERY 12 HOURS
Qty: 60 TABLET | Refills: 1 | Status: SHIPPED | OUTPATIENT
Start: 2019-10-17 | End: 2020-02-19

## 2019-10-17 RX ORDER — PREDNISONE 20 MG/1
20 TABLET ORAL EVERY 8 HOURS SCHEDULED
Qty: 9 TABLET | Refills: 0 | Status: SHIPPED | OUTPATIENT
Start: 2019-10-17 | End: 2019-10-30

## 2019-10-17 RX ADMIN — ACETAMINOPHEN 1000 MG: 500 TABLET, FILM COATED ORAL at 02:51

## 2019-10-17 RX ADMIN — BUPRENORPHINE HCL 4 MG: 2 TABLET SUBLINGUAL at 08:00

## 2019-10-17 RX ADMIN — PREDNISONE 20 MG: 20 TABLET ORAL at 06:59

## 2019-10-17 RX ADMIN — RIVAROXABAN 10 MG: 10 TABLET, FILM COATED ORAL at 07:55

## 2019-10-17 RX ADMIN — LEVOTHYROXINE SODIUM 25 MCG: 25 TABLET ORAL at 06:59

## 2019-10-17 RX ADMIN — GABAPENTIN 800 MG: 400 CAPSULE ORAL at 06:58

## 2019-10-17 RX ADMIN — TRAMADOL HYDROCHLORIDE 50 MG: 50 TABLET, FILM COATED ORAL at 03:01

## 2019-10-17 RX ADMIN — BUPRENORPHINE HCL 4 MG: 2 TABLET SUBLINGUAL at 13:01

## 2019-10-17 RX ADMIN — ACETAMINOPHEN 1000 MG: 500 TABLET, FILM COATED ORAL at 10:03

## 2019-10-17 RX ADMIN — NICOTINE 1 PATCH: 21 PATCH TRANSDERMAL at 08:00

## 2019-10-17 RX ADMIN — Medication 1 TABLET: at 08:00

## 2019-10-17 RX ADMIN — CYANOCOBALAMIN TAB 1000 MCG 1000 MCG: 1000 TAB at 07:54

## 2019-10-17 RX ADMIN — VELPATASVIR AND SOFOSBUVIR 1 TABLET: 100; 400 TABLET, FILM COATED ORAL at 07:00

## 2019-10-17 RX ADMIN — GABAPENTIN 800 MG: 400 CAPSULE ORAL at 13:01

## 2019-10-17 RX ADMIN — PREDNISONE 20 MG: 20 TABLET ORAL at 13:02

## 2019-10-17 RX ADMIN — ESCITALOPRAM OXALATE 20 MG: 10 TABLET, FILM COATED ORAL at 07:52

## 2019-10-17 RX ADMIN — CYCLOBENZAPRINE HYDROCHLORIDE 10 MG: 10 TABLET, FILM COATED ORAL at 07:53

## 2019-10-17 NOTE — TELEPHONE ENCOUNTER
Matt calling stating the patient is currently on Suboxone so he cannot fill the Untram sent in by Dr. Curry. This has been discontinued off his chart.

## 2019-10-18 ENCOUNTER — READMISSION MANAGEMENT (OUTPATIENT)
Dept: CALL CENTER | Facility: HOSPITAL | Age: 49
End: 2019-10-18

## 2019-10-18 LAB — HEPATITIS C RNA-NAA: NEGATIVE

## 2019-10-18 NOTE — OUTREACH NOTE
Prep Survey      Responses   Facility patient discharged from?  [facility]   Is LACE score < 7 ?  Yes   Is patient eligible?  Yes   Discharge diagnosis  Primary osteoarthritis of left knee, s/p left total knee arthroplasty   Does the patient have one of the following disease processes/diagnoses(primary or secondary)?  Total Joint Replacement [LACE <7]   Does the patient have Home health ordered?  Yes   What is the Home health agency?   [facility]   Is there a DME ordered?  Yes   What DME was ordered?  [employer]   Comments regarding appointments  See AVS   Prep survey completed?  Yes          Abbie Rawls RN

## 2019-10-21 ENCOUNTER — READMISSION MANAGEMENT (OUTPATIENT)
Dept: CALL CENTER | Facility: HOSPITAL | Age: 49
End: 2019-10-21

## 2019-10-21 NOTE — OUTREACH NOTE
Total Joint Week 1 Survey      Responses   Facility patient discharged from?  [facility]   Does the patient have one of the following disease processes/diagnoses(primary or secondary)?  Total Joint Replacement   Is there a successful TCM telephone encounter documented?  No   Joint surgery performed?  Knee   Week 1 attempt successful?  Yes   Call start time  1121   Call end time  1134   Has the patient been back in either the hospital or Emergency Department since discharge?  No   Discharge diagnosis  Primary osteoarthritis of left knee, s/p left total knee arthroplasty   Is patient permission given to speak with other caregiver?  No   Does the patient have all medications related to this admission filled (includes all antibiotics, pain medications, etc.)  Yes   Is the patient taking all medications as directed (includes completed medication regime)?  Yes   Is the patient able to teach back alternate methods of pain control?  Ice, Knee-elevation/no pillow under knee   Does the patient have a follow up appointment with their surgeon?  Yes   Has the patient kept scheduled appointments due by today?  N/A   What is the Home health agency?   [facility]   Has home health visited the patient within 72 hours of discharge?  Yes   What DME was ordered?  [employer]   Has all DME been delivered?  Yes   Psychosocial issues?  No   Has the patient began therapy sessions (either in the home or as an out patient)?  Yes   If the patient has started attending therapy, what post op day did they begin to attend (either in home or as an out patient)?    HH PT today, 2X a week.    Does the patient have a wound vac in place?  N/A   Has the patient fallen since discharge?  No   Did the patient receive a copy of their discharge instructions?  Yes   Nursing interventions  Reviewed instructions with patient   What is the patient's perception of their functional status since discharge?  Improving   Is the patient able to teach back signs and  symptoms of infection?  Temp >100.4 for 24h or longer, Incisional drainage, Blisters around incision, Increased swelling or redness around incision (not associated with surgical edema), Severe discomfort or pain, Changes in mobility, Shortness of breath or chest pain   Is the patient able to teach back how to prevent infection?  Check incision daily, Wash hands before and after touching incision, Keep incision covered if drainage, Shower only as directed by surgeon, No tub baths, hot tub or swimming   Is the patient able to teach back signs and symptoms of DVT?  Redness in calf, Area hot to touch, Shortness of breath or chest pain, Swelling in calf, Severe pain in calf   Did the patient implement home safety suggestions from pre-surgery classes if attended?  No [Patient states no class need for this surgery,  had class with with other knee done. ]   Is the patient/caregiver able to teach back the hierarchy of who to call/visit for symptoms/problems? PCP, Specialist, Home health nurse, Urgent Care, ED, 911  Yes   Week 1 call completed?  Yes          Abbie Hopkins RN

## 2019-10-28 RX ORDER — CYCLOBENZAPRINE HCL 10 MG
TABLET ORAL
Qty: 90 TABLET | Refills: 0 | Status: SHIPPED | OUTPATIENT
Start: 2019-10-28 | End: 2020-01-06

## 2019-10-29 NOTE — PAT
Pt here for PAT/joint camp visit.  Pre-op tests completed, chg soap given, and instructions reviewed.  Instructed clears until 2 hrs prior to arrival time, voiced understanding.  Surgeon's office to advise patient on holding Plavix for surgery.  Instructed no smoking after MN night prior to surgery, voiced understanding.  Awaiting EKG/cardiology lov notes for anesthesia review.  
Patient's belongings returned

## 2019-10-30 ENCOUNTER — READMISSION MANAGEMENT (OUTPATIENT)
Dept: CALL CENTER | Facility: HOSPITAL | Age: 49
End: 2019-10-30

## 2019-10-30 ENCOUNTER — OFFICE VISIT (OUTPATIENT)
Dept: ORTHOPEDIC SURGERY | Facility: CLINIC | Age: 49
End: 2019-10-30

## 2019-10-30 DIAGNOSIS — Z96.652 STATUS POST TOTAL LEFT KNEE REPLACEMENT: Primary | ICD-10-CM

## 2019-10-30 DIAGNOSIS — Z96.651 STATUS POST TOTAL RIGHT KNEE REPLACEMENT: ICD-10-CM

## 2019-10-30 PROCEDURE — 99024 POSTOP FOLLOW-UP VISIT: CPT | Performed by: ORTHOPAEDIC SURGERY

## 2019-10-30 NOTE — PROGRESS NOTES
Subjective: Status post left total knee arthroplasty     Patient ID: Gasper Greene is a 49 y.o. male.    Chief Complaint:    History of Present Illness patient is 2 weeks out from his left total knee arthroplasty he is now doing well.  The intense pain he had after surgery has resolved and is now controlled with the medication he does take to the Suboxone.   is ambulate independently with crutches.  States just about all of the preoperative pain has resolved.       Social History     Occupational History   • Not on file   Tobacco Use   • Smoking status: Current Every Day Smoker     Packs/day: 1.00     Years: 35.00     Pack years: 35.00     Types: Cigarettes   • Smokeless tobacco: Former User   Substance and Sexual Activity   • Alcohol use: No     Frequency: Never     Comment: h/o stopped in 2000   • Drug use: Yes     Types: Methamphetamines, Oxycodone     Comment: history of, last 12/ 2017, and narcotics   • Sexual activity: Defer      Review of Systems   Constitutional: Negative for chills, diaphoresis, fever and unexpected weight change.   HENT: Negative for hearing loss, nosebleeds, sore throat and tinnitus.    Eyes: Negative for pain and visual disturbance.   Respiratory: Negative for cough, shortness of breath and wheezing.    Cardiovascular: Negative for chest pain and palpitations.   Gastrointestinal: Negative for abdominal pain, diarrhea, nausea and vomiting.   Endocrine: Negative for cold intolerance, heat intolerance and polydipsia.   Genitourinary: Negative for difficulty urinating, dysuria and hematuria.   Musculoskeletal: Negative for arthralgias, joint swelling and myalgias.   Skin: Negative for rash and wound.   Allergic/Immunologic: Negative for environmental allergies.   Neurological: Negative for dizziness, syncope and numbness.   Hematological: Does not bruise/bleed easily.   Psychiatric/Behavioral: Negative for dysphoric mood and sleep disturbance. The patient is not nervous/anxious.           Past Medical History:   Diagnosis Date   • Arthritis    • Chest pain     states has been told d/t anxiety   • Coronary artery disease 09/2017    s/p stents, Dr Napier lov 1/2019   • Frequent urination at night    • Hepatitis C     Dr Melendrez treating   • History of bleeding ulcers 2012   • History of GI bleed 2012   • History of MI (myocardial infarction) 09/2017    posterior, Dr Napier follows   • History of palpitations     states has pain w/, cardiologist aware   • History of transfusion    • Hyperlipidemia    • Hypothyroidism    • Ischemic cardiomyopathy    • Left knee DJD     sched TKA   • Neuropathy of both feet    • NSVT (nonsustained ventricular tachycardia) (CMS/HCC)     h/o of after MI 2017   • Osteomyelitis of right foot (CMS/HCC)     h/o   • Sleep apnea     no machine     Past Surgical History:   Procedure Laterality Date   • CARDIAC CATHETERIZATION  09/2017   • CORONARY ANGIOPLASTY WITH STENT PLACEMENT  09/2017   • JOINT REPLACEMENT      RTK   • KNEE ACL RECONSTRUCTION Left 1992   • KNEE SURGERY Right     tendon rupture and repair/replace   • METATARSAL OSTEOTOMY Right 02/2019    2nd   • STOMACH SURGERY      d/t bleeding ulcer   • TOTAL KNEE ARTHROPLASTY Right 4/30/2019    Procedure: RIGHT TOTAL KNEE ARTHROPLASTY-[place], Orth align;  Surgeon: Bob Curry MD;  Location: [facility] OR;  Service: Orthopedics   • TOTAL KNEE ARTHROPLASTY Left 10/15/2019    Procedure: LEFT  TOTAL KNEE ARTHROPLASTY, one screw explanted from previous left knee surgery;  Surgeon: Bob Curry MD;  Location: [facility] OR;  Service: Orthopedics     Family History   Problem Relation Age of Onset   • Lung cancer Mother    • Alcohol abuse Father    • Seizures Father    • Diabetes Maternal Aunt    • Diabetes Maternal Uncle    • Alcohol abuse Paternal Aunt    • Alcohol abuse Paternal Uncle    • Heart disease Maternal Grandmother    • Alcohol abuse Paternal Grandmother    • Cirrhosis Paternal Grandmother    • Hypertension Paternal  Grandfather    • Malig Hyperthermia Neg Hx          Objective:  There were no vitals filed for this visit.  There were no vitals filed for this visit.  There is no height or weight on file to calculate BMI.        Ortho Exam   Is alert and oriented x3.  His wound is benign.  States he did have some drainage from the wound but the wound is closed there is minimal erythema mild boggy synovitis but no effusion to the knee.  No motor or sensory deficit calf is nontender good distal pulses.    Assessment:        1. Status post total left knee replacement    2. Status post total right knee replacement           Plan: Begin outpatient physical therapy return in 2 weeks with an x-ray of his knee.            Work Status:    [employer] query complete.    Orders:  No orders of the defined types were placed in this encounter.      Medications:  No orders of the defined types were placed in this encounter.      Followup:  Return in about 3 weeks (around 11/20/2019).          Dictated utilizing Dragon dictation

## 2019-10-30 NOTE — OUTREACH NOTE
Total Joint Week 2 Survey      Responses   Facility patient discharged from?  [facility]   Does the patient have one of the following disease processes/diagnoses(primary or secondary)?  Total Joint Replacement   Joint surgery performed?  Knee   Week 2 attempt successful?  Yes   Call start time  1227   Call end time  1228   Has the patient been back in either the hospital or Emergency Department since discharge?  No   Discharge diagnosis  Primary osteoarthritis of left knee, s/p left total knee arthroplasty   Is patient permission given to speak with other caregiver?  No   Does the patient have all medications related to this admission filled (includes all antibiotics, pain medications, etc.)  Yes   Is the patient taking all medications as directed (includes completed medication regime)?  Yes   Is the patient able to teach back alternate methods of pain control?  Ice, Knee-elevation/no pillow under knee   Does the patient have a follow up appointment with their surgeon?  Yes   Has the patient kept scheduled appointments due by today?  N/A   What is the Home health agency?   [facility]   Has home health visited the patient within 72 hours of discharge?  Yes   What DME was ordered?  [employer]   Has all DME been delivered?  Yes   Has the patient began therapy sessions (either in the home or as an out patient)?  Yes   Does the patient have a wound vac in place?  N/A   Has the patient fallen since discharge?  No   Did the patient receive a copy of their discharge instructions?  Yes   What is the patient's perception of their functional status since discharge?  Improving   Is the patient able to teach back signs and symptoms of infection?  Temp >100.4 for 24h or longer, Incisional drainage, Blisters around incision, Increased swelling or redness around incision (not associated with surgical edema), Severe discomfort or pain, Changes in mobility, Shortness of breath or chest pain   Is the patient able to teach back how  to prevent infection?  Check incision daily, Wash hands before and after touching incision, Keep incision covered if drainage, Shower only as directed by surgeon, No tub baths, hot tub or swimming   Is the patient able to teach back signs and symptoms of DVT?  Redness in calf, Area hot to touch, Shortness of breath or chest pain, Swelling in calf, Severe pain in calf   Did the patient implement home safety suggestions from pre-surgery classes if attended?  No   Is the patient/caregiver able to teach back the hierarchy of who to call/visit for symptoms/problems? PCP, Specialist, Home health nurse, Urgent Care, ED, 911  Yes   Week 2 call completed?  Yes          Jay Mills RN

## 2019-11-20 ENCOUNTER — OFFICE VISIT (OUTPATIENT)
Dept: ORTHOPEDIC SURGERY | Facility: CLINIC | Age: 49
End: 2019-11-20

## 2019-11-20 VITALS — HEIGHT: 69 IN | WEIGHT: 229 LBS | BODY MASS INDEX: 33.92 KG/M2

## 2019-11-20 DIAGNOSIS — Z96.651 STATUS POST TOTAL RIGHT KNEE REPLACEMENT: ICD-10-CM

## 2019-11-20 DIAGNOSIS — Z96.652 STATUS POST TOTAL LEFT KNEE REPLACEMENT: Primary | ICD-10-CM

## 2019-11-20 PROCEDURE — 99024 POSTOP FOLLOW-UP VISIT: CPT | Performed by: ORTHOPAEDIC SURGERY

## 2019-11-20 PROCEDURE — 73562 X-RAY EXAM OF KNEE 3: CPT | Performed by: ORTHOPAEDIC SURGERY

## 2019-11-20 NOTE — PROGRESS NOTES
Subjective: Status post left total knee     Patient ID: Gasper Greene is a 49 y.o. male.    Chief Complaint:    History of Present Illness patient is 4 weeks out is doing well.  Ambulate independently.  Using crutches more because of weakness in the quads of both legs and knee pain itself.  All of his preoperative pain has resolved.  He is taking meloxicam.       Social History     Occupational History   • Not on file   Tobacco Use   • Smoking status: Current Every Day Smoker     Packs/day: 1.00     Years: 35.00     Pack years: 35.00     Types: Cigarettes   • Smokeless tobacco: Former User   Substance and Sexual Activity   • Alcohol use: No     Frequency: Never     Comment: h/o stopped in 2000   • Drug use: Yes     Types: Methamphetamines, Oxycodone     Comment: history of, last 12/ 2017, and narcotics   • Sexual activity: Defer      Review of Systems   Constitutional: Negative for chills, diaphoresis, fever and unexpected weight change.   HENT: Negative for hearing loss, nosebleeds, sore throat and tinnitus.    Eyes: Negative for pain and visual disturbance.   Respiratory: Negative for cough, shortness of breath and wheezing.    Cardiovascular: Negative for chest pain and palpitations.   Gastrointestinal: Negative for abdominal pain, diarrhea, nausea and vomiting.   Endocrine: Negative for cold intolerance, heat intolerance and polydipsia.   Genitourinary: Negative for difficulty urinating, dysuria and hematuria.   Musculoskeletal: Positive for arthralgias and myalgias. Negative for joint swelling.   Skin: Negative for rash and wound.   Allergic/Immunologic: Negative for environmental allergies.   Neurological: Negative for dizziness, syncope and numbness.   Hematological: Does not bruise/bleed easily.   Psychiatric/Behavioral: Negative for dysphoric mood and sleep disturbance. The patient is not nervous/anxious.          Past Medical History:   Diagnosis Date   • Arthritis    • Chest pain     states has been  told d/t anxiety   • Coronary artery disease 09/2017    s/p stents, Dr Napier lov 1/2019   • Frequent urination at night    • Hepatitis C     Dr Melendrez treating   • History of bleeding ulcers 2012   • History of GI bleed 2012   • History of MI (myocardial infarction) 09/2017    posterior, Dr Napier follows   • History of palpitations     states has pain w/, cardiologist aware   • History of transfusion    • Hyperlipidemia    • Hypothyroidism    • Ischemic cardiomyopathy    • Left knee DJD     sched TKA   • Neuropathy of both feet    • NSVT (nonsustained ventricular tachycardia) (CMS/HCC)     h/o of after MI 2017   • Osteomyelitis of right foot (CMS/HCC)     h/o   • Sleep apnea     no machine     Past Surgical History:   Procedure Laterality Date   • CARDIAC CATHETERIZATION  09/2017   • CORONARY ANGIOPLASTY WITH STENT PLACEMENT  09/2017   • JOINT REPLACEMENT      RTK   • KNEE ACL RECONSTRUCTION Left 1992   • KNEE SURGERY Right     tendon rupture and repair/replace   • METATARSAL OSTEOTOMY Right 02/2019 2nd   • STOMACH SURGERY      d/t bleeding ulcer   • TOTAL KNEE ARTHROPLASTY Right 4/30/2019    Procedure: RIGHT TOTAL KNEE ARTHROPLASTY-[place], Orth align;  Surgeon: Bob Curry MD;  Location:  [facility] OR;  Service: Orthopedics   • TOTAL KNEE ARTHROPLASTY Left 10/15/2019    Procedure: LEFT  TOTAL KNEE ARTHROPLASTY, one screw explanted from previous left knee surgery;  Surgeon: Bob Curry MD;  Location:  [facility] OR;  Service: Orthopedics     Family History   Problem Relation Age of Onset   • Lung cancer Mother    • Alcohol abuse Father    • Seizures Father    • Diabetes Maternal Aunt    • Diabetes Maternal Uncle    • Alcohol abuse Paternal Aunt    • Alcohol abuse Paternal Uncle    • Heart disease Maternal Grandmother    • Alcohol abuse Paternal Grandmother    • Cirrhosis Paternal Grandmother    • Hypertension Paternal Grandfather    • Malig Hyperthermia Neg Hx          Objective:  There were no vitals filed for this  visit.      11/20/19  1440   Weight: 104 kg (229 lb)     Body mass index is 33.82 kg/m².        Ortho Exam   AP lateral sunrise view of the left knee shows perfect position of the implant in all 3 views.  His wounds completely benign.  No motor or sensory deficit.  The knee is well aligned.  Quad function again is maybe 3-1/2 to 4 out of 5 calf is nontender good distal pulses normal sensory deficit.    Assessment:        1. Status post total left knee replacement    2. Status post total right knee replacement           Plan: Continue strengthening program to both knees return to see me in a month continue the anti-inflammatory until seen back.  Answered all questions.            Work Status:    [employer] query complete.    Orders:  Orders Placed This Encounter   Procedures   • XR Knee 3 View Left       Medications:  No orders of the defined types were placed in this encounter.      Followup:  Return in about 4 weeks (around 12/18/2019).          Dictated utilizing Dragon dictation

## 2019-12-02 RX ORDER — CYCLOBENZAPRINE HCL 10 MG
10 TABLET ORAL 3 TIMES DAILY
Qty: 90 TABLET | Refills: 0 | Status: SHIPPED | OUTPATIENT
Start: 2019-12-02 | End: 2020-01-06 | Stop reason: SDUPTHER

## 2019-12-02 NOTE — TELEPHONE ENCOUNTER
Patient calling for an RX refill of FLEXERIL-he states he was working with home PT and pulled some muscles in his thigh.    He is status post TKA Left on 10.15.2019.

## 2020-01-06 ENCOUNTER — OFFICE VISIT (OUTPATIENT)
Dept: ORTHOPEDIC SURGERY | Facility: CLINIC | Age: 50
End: 2020-01-06

## 2020-01-06 DIAGNOSIS — Z96.652 STATUS POST TOTAL LEFT KNEE REPLACEMENT: Primary | ICD-10-CM

## 2020-01-06 DIAGNOSIS — Z96.651 STATUS POST TOTAL RIGHT KNEE REPLACEMENT: ICD-10-CM

## 2020-01-06 PROCEDURE — 99024 POSTOP FOLLOW-UP VISIT: CPT | Performed by: ORTHOPAEDIC SURGERY

## 2020-01-06 RX ORDER — CYCLOBENZAPRINE HCL 10 MG
10 TABLET ORAL 3 TIMES DAILY
Qty: 90 TABLET | Refills: 0 | Status: SHIPPED | OUTPATIENT
Start: 2020-01-06 | End: 2020-02-19 | Stop reason: SDUPTHER

## 2020-01-06 NOTE — PROGRESS NOTES
Subjective: Status post bilateral total knee arthroplasty, right knee in April left knee in October     Patient ID: Gasper Greene is a 49 y.o. male.    Chief Complaint:    History of Present Illness again is doing well at this time ambulating independently.  His biggest problem right now really is more muscle weakness of both legs but primarily the right.  Again all the preoperative pain he had in either knee has resolved and is pleased with the results.  He is now in physical therapy working on a strengthening program.       Social History     Occupational History   • Not on file   Tobacco Use   • Smoking status: Current Every Day Smoker     Packs/day: 1.00     Years: 35.00     Pack years: 35.00     Types: Cigarettes   • Smokeless tobacco: Former User   Substance and Sexual Activity   • Alcohol use: No     Frequency: Never     Comment: h/o stopped in 2000   • Drug use: Yes     Types: Methamphetamines, Oxycodone     Comment: history of, last 12/ 2017, and narcotics   • Sexual activity: Defer      Review of Systems   Constitutional: Negative for chills, diaphoresis, fever and unexpected weight change.   HENT: Negative for hearing loss, nosebleeds, sore throat and tinnitus.    Eyes: Negative for pain and visual disturbance.   Respiratory: Negative for cough, shortness of breath and wheezing.    Cardiovascular: Negative for chest pain and palpitations.   Gastrointestinal: Negative for abdominal pain, diarrhea, nausea and vomiting.   Endocrine: Negative for cold intolerance, heat intolerance and polydipsia.   Genitourinary: Negative for difficulty urinating, dysuria and hematuria.   Musculoskeletal: Positive for arthralgias. Negative for joint swelling and myalgias.   Skin: Negative for rash and wound.   Allergic/Immunologic: Negative for environmental allergies.   Neurological: Negative for dizziness, syncope and numbness.   Hematological: Does not bruise/bleed easily.   Psychiatric/Behavioral: Negative for dysphoric  mood and sleep disturbance. The patient is not nervous/anxious.          Past Medical History:   Diagnosis Date   • Arthritis    • Chest pain     states has been told d/t anxiety   • Coronary artery disease 09/2017    s/p stents, Dr Napier lov 1/2019   • Frequent urination at night    • Hepatitis C     Dr Melendrez treating   • History of bleeding ulcers 2012   • History of GI bleed 2012   • History of MI (myocardial infarction) 09/2017    posterior, Dr Napier follows   • History of palpitations     states has pain w/, cardiologist aware   • History of transfusion    • Hyperlipidemia    • Hypothyroidism    • Ischemic cardiomyopathy    • Left knee DJD     sched TKA   • Neuropathy of both feet    • NSVT (nonsustained ventricular tachycardia) (CMS/HCC)     h/o of after MI 2017   • Osteomyelitis of right foot (CMS/HCC)     h/o   • Sleep apnea     no machine     Past Surgical History:   Procedure Laterality Date   • CARDIAC CATHETERIZATION  09/2017   • CORONARY ANGIOPLASTY WITH STENT PLACEMENT  09/2017   • JOINT REPLACEMENT      RTK   • KNEE ACL RECONSTRUCTION Left 1992   • KNEE SURGERY Right     tendon rupture and repair/replace   • METATARSAL OSTEOTOMY Right 02/2019 2nd   • STOMACH SURGERY      d/t bleeding ulcer   • TOTAL KNEE ARTHROPLASTY Right 4/30/2019    Procedure: RIGHT TOTAL KNEE ARTHROPLASTY-[place], Orth align;  Surgeon: Bob Curry MD;  Location:  [facility] OR;  Service: Orthopedics   • TOTAL KNEE ARTHROPLASTY Left 10/15/2019    Procedure: LEFT  TOTAL KNEE ARTHROPLASTY, one screw explanted from previous left knee surgery;  Surgeon: Bob Curry MD;  Location: [facility];  Service: Orthopedics     Family History   Problem Relation Age of Onset   • Lung cancer Mother    • Alcohol abuse Father    • Seizures Father    • Diabetes Maternal Aunt    • Diabetes Maternal Uncle    • Alcohol abuse Paternal Aunt    • Alcohol abuse Paternal Uncle    • Heart disease Maternal Grandmother    • Alcohol abuse Paternal Grandmother     • Cirrhosis Paternal Grandmother    • Hypertension Paternal Grandfather    • Malig Hyperthermia Neg Hx          Objective:  There were no vitals filed for this visit.  There were no vitals filed for this visit.  There is no height or weight on file to calculate BMI.        Ortho Exam   He is alert and oriented x3.  The left knee most recently done has 0 to 125 degrees of motion with no instability throughout the arc of motion.  No motor or sensory deficit good capillary refill.  Quad function is probably 4 out of 5.  His calf nontender good distal pulses good capillary refill.  No instability again throughout the arc of motion.  Right knee shows moderate quad atrophy maybe 3 out of 5 no instability to the knee on exam though good distal pulses good capillary refill.    Assessment:        1. Status post total left knee replacement    2. Status post total right knee replacement           Plan: Continue very vigorous strengthening program return to see me in 6 weeks no x-rays necessary            Work Status:    [facility] query complete.    Orders:  No orders of the defined types were placed in this encounter.      Medications:  New Medications Ordered This Visit   Medications   • cyclobenzaprine (FLEXERIL) 10 MG tablet     Sig: Take 1 tablet by mouth 3 (Three) Times a Day.     Dispense:  90 tablet     Refill:  0       Followup:  Return in about 6 weeks (around 2/17/2020).          Dictated utilizing Dragon dictation

## 2020-02-19 ENCOUNTER — OFFICE VISIT (OUTPATIENT)
Dept: ORTHOPEDIC SURGERY | Facility: CLINIC | Age: 50
End: 2020-02-19

## 2020-02-19 DIAGNOSIS — Z96.652 STATUS POST TOTAL LEFT KNEE REPLACEMENT: Primary | ICD-10-CM

## 2020-02-19 DIAGNOSIS — Z96.651 STATUS POST TOTAL RIGHT KNEE REPLACEMENT: ICD-10-CM

## 2020-02-19 PROCEDURE — 99213 OFFICE O/P EST LOW 20 MIN: CPT | Performed by: ORTHOPAEDIC SURGERY

## 2020-02-19 RX ORDER — CYCLOBENZAPRINE HCL 10 MG
10 TABLET ORAL 3 TIMES DAILY
Qty: 90 TABLET | Refills: 1 | Status: SHIPPED | OUTPATIENT
Start: 2020-02-19 | End: 2020-03-20

## 2020-02-19 RX ORDER — ASPIRIN 81 MG/1
81 TABLET, CHEWABLE ORAL DAILY
COMMUNITY

## 2020-02-19 RX ORDER — CITALOPRAM 20 MG/1
20 TABLET ORAL DAILY
COMMUNITY
Start: 2020-02-07 | End: 2020-05-21 | Stop reason: DRUGHIGH

## 2020-02-19 RX ORDER — METHYLPREDNISOLONE 4 MG/1
TABLET ORAL
Qty: 21 TABLET | Refills: 0 | Status: SHIPPED | OUTPATIENT
Start: 2020-02-19 | End: 2020-03-02 | Stop reason: SDUPTHER

## 2020-02-19 NOTE — PROGRESS NOTES
Subjective: Status post bilateral total knee arthroplasty     Patient ID: Gasper Greene is a 49 y.o. male.    Chief Complaint:    History of Present Illness 49-year-old male is 10 months status post a right total knee done 4 months status post a left total knee.  The right knee continues to give him some discomfort the left knee is giving him minimal pain.  Is been in physical therapy.  His problem remains significant quad atrophy of both legs.       Social History     Occupational History   • Not on file   Tobacco Use   • Smoking status: Current Every Day Smoker     Packs/day: 1.00     Years: 35.00     Pack years: 35.00     Types: Cigarettes   • Smokeless tobacco: Former User   Substance and Sexual Activity   • Alcohol use: No     Frequency: Never     Comment: h/o stopped in 2000   • Drug use: Yes     Types: Methamphetamines, Oxycodone     Comment: history of, last 12/ 2017, and narcotics   • Sexual activity: Defer      Review of Systems   Constitutional: Negative for chills, diaphoresis, fever and unexpected weight change.   HENT: Negative for hearing loss, nosebleeds, sore throat and tinnitus.    Eyes: Negative for pain and visual disturbance.   Respiratory: Negative for cough, shortness of breath and wheezing.    Cardiovascular: Negative for chest pain and palpitations.   Gastrointestinal: Negative for abdominal pain, diarrhea, nausea and vomiting.   Endocrine: Negative for cold intolerance, heat intolerance and polydipsia.   Genitourinary: Negative for difficulty urinating, dysuria and hematuria.   Musculoskeletal: Positive for arthralgias and myalgias. Negative for joint swelling.   Skin: Negative for rash and wound.   Allergic/Immunologic: Negative for environmental allergies.   Neurological: Negative for dizziness, syncope and numbness.   Hematological: Does not bruise/bleed easily.   Psychiatric/Behavioral: Negative for dysphoric mood and sleep disturbance. The patient is not nervous/anxious.           Past Medical History:   Diagnosis Date   • Arthritis    • Chest pain     states has been told d/t anxiety   • Coronary artery disease 09/2017    s/p stents, Dr Napier lov 1/2019   • Frequent urination at night    • Hepatitis C     Dr Melendrez treating   • History of bleeding ulcers 2012   • History of GI bleed 2012   • History of MI (myocardial infarction) 09/2017    posterior, Dr Napier follows   • History of palpitations     states has pain w/, cardiologist aware   • History of transfusion    • Hyperlipidemia    • Hypothyroidism    • Ischemic cardiomyopathy    • Left knee DJD     sched TKA   • Neuropathy of both feet    • NSVT (nonsustained ventricular tachycardia) (CMS/HCC)     h/o of after MI 2017   • Osteomyelitis of right foot (CMS/HCC)     h/o   • Sleep apnea     no machine     Past Surgical History:   Procedure Laterality Date   • CARDIAC CATHETERIZATION  09/2017   • CORONARY ANGIOPLASTY WITH STENT PLACEMENT  09/2017   • JOINT REPLACEMENT      RTK   • KNEE ACL RECONSTRUCTION Left 1992   • KNEE SURGERY Right     tendon rupture and repair/replace   • METATARSAL OSTEOTOMY Right 02/2019    2nd   • STOMACH SURGERY      d/t bleeding ulcer   • TOTAL KNEE ARTHROPLASTY Right 4/30/2019    Procedure: RIGHT TOTAL KNEE ARTHROPLASTY-[place], Orth align;  Surgeon: Bob Curry MD;  Location: [facility] OR;  Service: Orthopedics   • TOTAL KNEE ARTHROPLASTY Left 10/15/2019    Procedure: LEFT  TOTAL KNEE ARTHROPLASTY, one screw explanted from previous left knee surgery;  Surgeon: Bob Curry MD;  Location: [facility] OR;  Service: Orthopedics     Family History   Problem Relation Age of Onset   • Lung cancer Mother    • Alcohol abuse Father    • Seizures Father    • Diabetes Maternal Aunt    • Diabetes Maternal Uncle    • Alcohol abuse Paternal Aunt    • Alcohol abuse Paternal Uncle    • Heart disease Maternal Grandmother    • Alcohol abuse Paternal Grandmother    • Cirrhosis Paternal Grandmother    • Hypertension Paternal  Grandfather    • Malig Hyperthermia Neg Hx          Objective:  There were no vitals filed for this visit.  There were no vitals filed for this visit.  There is no height or weight on file to calculate BMI.        Ortho Exam   Is alert and oriented x3.  Both knees have 0 to 125 degrees of motion with no instability throughout the arc of motion.  Quad function remains maybe 3 out of 5 of both legs.  Calves are nontender.  Good is to pulses no motor or sensory deficit.  The skin is cool to touch.  Hamstring function is 4 out of 5.  Is taken anti-inflammatories without any GI side effect.  He is ambulate independently he does state all the preoperative pain has resolved.    Assessment:        1. Status post total left knee replacement    2. Status post total right knee replacement           Plan: Spent over 10 minutes with the patient reviewing his exam and his history.  I think the pain is experiencing particularly in the right knee is still related to the quad atrophy is a significant atrophy of both legs but more pronounced seems to be on the right leg.  We will prescribe a steroid pack to try to help with some of the soreness also refill the Flexeril as that does help with some of the discomfort.  Content intensive physical therapy strengthening program return to see me in 3 months.  Answered all questions.            Work Status:    ORTIZ query complete.    Orders:  No orders of the defined types were placed in this encounter.      Medications:  New Medications Ordered This Visit   Medications   • cyclobenzaprine (FLEXERIL) 10 MG tablet     Sig: Take 1 tablet by mouth 3 (Three) Times a Day.     Dispense:  90 tablet     Refill:  1   • methylPREDNISolone (MEDROL, VILMA,) 4 MG tablet     Sig: Use as directed by package instructions     Dispense:  21 tablet     Refill:  0       Followup:  Return in about 3 months (around 5/19/2020).          Dictated utilizing Dragon dictation

## 2020-02-27 ENCOUNTER — TELEPHONE (OUTPATIENT)
Dept: ORTHOPEDIC SURGERY | Facility: CLINIC | Age: 50
End: 2020-02-27

## 2020-03-02 DIAGNOSIS — Z96.652 STATUS POST TOTAL LEFT KNEE REPLACEMENT: Primary | ICD-10-CM

## 2020-03-02 DIAGNOSIS — Z96.651 STATUS POST TOTAL RIGHT KNEE REPLACEMENT: ICD-10-CM

## 2020-03-02 RX ORDER — METHYLPREDNISOLONE 4 MG/1
TABLET ORAL
Qty: 21 TABLET | Refills: 0 | Status: SHIPPED | OUTPATIENT
Start: 2020-03-02 | End: 2020-09-09

## 2020-03-06 ENCOUNTER — TELEPHONE (OUTPATIENT)
Dept: ORTHOPEDIC SURGERY | Facility: CLINIC | Age: 50
End: 2020-03-06

## 2020-03-12 RX ORDER — METHYLPREDNISOLONE 4 MG/1
TABLET ORAL
Qty: 21 TABLET | Refills: 0 | OUTPATIENT
Start: 2020-03-12

## 2020-03-20 RX ORDER — CYCLOBENZAPRINE HCL 10 MG
TABLET ORAL
Qty: 90 TABLET | Refills: 1 | Status: SHIPPED | OUTPATIENT
Start: 2020-03-20

## 2020-05-21 ENCOUNTER — OFFICE VISIT (OUTPATIENT)
Dept: ORTHOPEDIC SURGERY | Facility: CLINIC | Age: 50
End: 2020-05-21

## 2020-05-21 VITALS — BODY MASS INDEX: 33.92 KG/M2 | HEIGHT: 69 IN | WEIGHT: 229 LBS

## 2020-05-21 DIAGNOSIS — Z96.651 STATUS POST TOTAL RIGHT KNEE REPLACEMENT: ICD-10-CM

## 2020-05-21 DIAGNOSIS — Z96.652 STATUS POST TOTAL LEFT KNEE REPLACEMENT: Primary | ICD-10-CM

## 2020-05-21 PROCEDURE — 99213 OFFICE O/P EST LOW 20 MIN: CPT | Performed by: ORTHOPAEDIC SURGERY

## 2020-05-21 RX ORDER — TRAZODONE HYDROCHLORIDE 50 MG/1
50 TABLET ORAL NIGHTLY
COMMUNITY

## 2020-05-21 RX ORDER — ATORVASTATIN CALCIUM 10 MG/1
TABLET, FILM COATED ORAL
COMMUNITY
Start: 2020-03-02

## 2020-05-21 RX ORDER — ESCITALOPRAM OXALATE 20 MG/1
20 TABLET ORAL DAILY
COMMUNITY
Start: 2020-04-27

## 2020-05-21 NOTE — PROGRESS NOTES
Subjective: Status post bilateral total knee arthroplasty     Patient ID: Gasper Greene is a 49 y.o. male.    Chief Complaint:    History of Present Illness patient is 10 months status post a right total knee 13-month status post left total knee and doing fairly well although still having some problems with soreness in the legs and weakness.  Is unable to complete physical therapy because of  shutdown due to the coronavirus.       Social History     Occupational History   • Not on file   Tobacco Use   • Smoking status: Current Every Day Smoker     Packs/day: 1.00     Years: 35.00     Pack years: 35.00     Types: Cigarettes   • Smokeless tobacco: Former User   Substance and Sexual Activity   • Alcohol use: No     Frequency: Never     Comment: h/o stopped in 2000   • Drug use: Yes     Types: Methamphetamines, Oxycodone     Comment: history of, last 12/ 2017, and narcotics   • Sexual activity: Defer      Review of Systems   Constitutional: Negative for chills, diaphoresis, fever and unexpected weight change.   HENT: Negative for hearing loss, nosebleeds, sore throat and tinnitus.    Eyes: Negative for pain and visual disturbance.   Respiratory: Negative for cough, shortness of breath and wheezing.    Cardiovascular: Negative for chest pain and palpitations.   Gastrointestinal: Negative for abdominal pain, diarrhea, nausea and vomiting.   Endocrine: Negative for cold intolerance, heat intolerance and polydipsia.   Genitourinary: Negative for difficulty urinating, dysuria and hematuria.   Musculoskeletal: Positive for arthralgias and myalgias. Negative for joint swelling.   Skin: Negative for rash and wound.   Allergic/Immunologic: Negative for environmental allergies.   Neurological: Negative for dizziness, syncope and numbness.   Hematological: Does not bruise/bleed easily.   Psychiatric/Behavioral: Negative for dysphoric mood and sleep disturbance. The patient is not nervous/anxious.          Past Medical History:    Diagnosis Date   • Arthritis    • Chest pain     states has been told d/t anxiety   • Coronary artery disease 09/2017    s/p stents, Dr Napier lov 1/2019   • Frequent urination at night    • Hepatitis C     Dr Melendrez treating   • History of bleeding ulcers 2012   • History of GI bleed 2012   • History of MI (myocardial infarction) 09/2017    posterior, Dr Napier follows   • History of palpitations     states has pain w/, cardiologist aware   • History of transfusion    • Hyperlipidemia    • Hypothyroidism    • Ischemic cardiomyopathy    • Left knee DJD     sched TKA   • Neuropathy of both feet    • NSVT (nonsustained ventricular tachycardia) (CMS/HCC)     h/o of after MI 2017   • Osteomyelitis of right foot (CMS/HCC)     h/o   • Sleep apnea     no machine     Past Surgical History:   Procedure Laterality Date   • CARDIAC CATHETERIZATION  09/2017   • CORONARY ANGIOPLASTY WITH STENT PLACEMENT  09/2017   • JOINT REPLACEMENT      RTK   • KNEE ACL RECONSTRUCTION Left 1992   • KNEE SURGERY Right     tendon rupture and repair/replace   • METATARSAL OSTEOTOMY Right 02/2019    2nd   • STOMACH SURGERY      d/t bleeding ulcer   • TOTAL KNEE ARTHROPLASTY Right 4/30/2019    Procedure: RIGHT TOTAL KNEE ARTHROPLASTY-[place], Orth align;  Surgeon: Bob Curry MD;  Location: [facility] OR;  Service: Orthopedics   • TOTAL KNEE ARTHROPLASTY Left 10/15/2019    Procedure: LEFT  TOTAL KNEE ARTHROPLASTY, one screw explanted from previous left knee surgery;  Surgeon: Bob Curry MD;  Location: [facility] OR;  Service: Orthopedics     Family History   Problem Relation Age of Onset   • Lung cancer Mother    • Alcohol abuse Father    • Seizures Father    • Diabetes Maternal Aunt    • Diabetes Maternal Uncle    • Alcohol abuse Paternal Aunt    • Alcohol abuse Paternal Uncle    • Heart disease Maternal Grandmother    • Alcohol abuse Paternal Grandmother    • Cirrhosis Paternal Grandmother    • Hypertension Paternal Grandfather    • Malig  Hyperthermia Neg Hx          Objective:  There were no vitals filed for this visit.      05/21/20  1301   Weight: 104 kg (229 lb)     Body mass index is 33.82 kg/m².        Ortho Exam   He is alert and oriented x3.  The right knee lacks a few degrees of extension flexion to about 125 degrees left knee is 0 to 125 degrees.  No instability in either knee.  No warmth to touch.  No instability throughout the arc of motion.  Quad function made 3 out of 5 of both legs hamstring function is 4 out of 5.  Both calves are nontender good distal pulses no motor or sensory deficit.  Is ambulating independently without cane or crutch.  Although again he has some pain is significantly better than it was prior to surgery of both knees.  Is able to perform all activities of daily living.    Assessment:        1. Status post total left knee replacement    2. Status post total right knee replacement           Plan: Advised to continue physical therapy states he has 6 more visits scheduled.  Will prescribe Voltaren gel for the soreness to the legs.  Return to see me in 3 months answered all questions.            Work Status:    The App3 query complete.    Orders:  No orders of the defined types were placed in this encounter.      Medications:  New Medications Ordered This Visit   Medications   • diclofenac (VOLTAREN) 1 % gel gel     Sig: Apply 4 g topically to the appropriate area as directed 4 (Four) Times a Day.     Dispense:  100 g     Refill:  5       Followup:  Return in about 3 months (around 8/21/2020).          Dictated utilizing Dragon dictation

## 2020-09-09 ENCOUNTER — OFFICE VISIT (OUTPATIENT)
Dept: ORTHOPEDIC SURGERY | Facility: CLINIC | Age: 50
End: 2020-09-09

## 2020-09-09 DIAGNOSIS — Z96.652 STATUS POST TOTAL LEFT KNEE REPLACEMENT: ICD-10-CM

## 2020-09-09 DIAGNOSIS — Z96.651 STATUS POST TOTAL RIGHT KNEE REPLACEMENT: Primary | ICD-10-CM

## 2020-09-09 PROCEDURE — 99213 OFFICE O/P EST LOW 20 MIN: CPT | Performed by: ORTHOPAEDIC SURGERY

## 2020-09-09 PROCEDURE — 73562 X-RAY EXAM OF KNEE 3: CPT | Performed by: ORTHOPAEDIC SURGERY

## 2020-09-09 PROCEDURE — 20610 DRAIN/INJ JOINT/BURSA W/O US: CPT | Performed by: ORTHOPAEDIC SURGERY

## 2020-09-09 RX ORDER — TRIAMCINOLONE ACETONIDE 40 MG/ML
40 INJECTION, SUSPENSION INTRA-ARTICULAR; INTRAMUSCULAR
Status: COMPLETED | OUTPATIENT
Start: 2020-09-09 | End: 2020-09-09

## 2020-09-09 RX ORDER — LIDOCAINE HYDROCHLORIDE 10 MG/ML
4 INJECTION, SOLUTION EPIDURAL; INFILTRATION; INTRACAUDAL; PERINEURAL
Status: COMPLETED | OUTPATIENT
Start: 2020-09-09 | End: 2020-09-09

## 2020-09-09 RX ORDER — HYDROCHLOROTHIAZIDE 12.5 MG/1
12.5 TABLET ORAL EVERY MORNING
COMMUNITY
Start: 2020-07-21

## 2020-09-09 RX ADMIN — TRIAMCINOLONE ACETONIDE 40 MG: 40 INJECTION, SUSPENSION INTRA-ARTICULAR; INTRAMUSCULAR at 15:43

## 2020-09-09 RX ADMIN — LIDOCAINE HYDROCHLORIDE 4 ML: 10 INJECTION, SOLUTION EPIDURAL; INFILTRATION; INTRACAUDAL; PERINEURAL at 15:43

## 2020-09-09 NOTE — PROGRESS NOTES
Subjective: Status post bilateral total knee arthroplasty with painful right knee     Patient ID: Gasper Greene is a 49 y.o. male.    Chief Complaint:    History of Present Illness patient seen for follow-up to the bilateral total knee arthroplasties.  The left knee remains asymptomatic but the right knee still gives him some discomfort.  Better than it was before surgery but still has some anterior knee pain.       Social History     Occupational History   • Not on file   Tobacco Use   • Smoking status: Current Every Day Smoker     Packs/day: 1.00     Years: 35.00     Pack years: 35.00     Types: Cigarettes   • Smokeless tobacco: Former User   Substance and Sexual Activity   • Alcohol use: No     Frequency: Never     Comment: h/o stopped in 2000   • Drug use: Yes     Types: Methamphetamines, Oxycodone     Comment: history of, last 12/ 2017, and narcotics   • Sexual activity: Defer      Review of Systems   Constitutional: Negative for chills, diaphoresis, fever and unexpected weight change.   HENT: Negative for hearing loss, nosebleeds, sore throat and tinnitus.    Eyes: Negative for pain and visual disturbance.   Respiratory: Negative for cough, shortness of breath and wheezing.    Cardiovascular: Negative for chest pain and palpitations.   Gastrointestinal: Negative for abdominal pain, diarrhea, nausea and vomiting.   Endocrine: Negative for cold intolerance, heat intolerance and polydipsia.   Genitourinary: Negative for difficulty urinating, dysuria and hematuria.   Musculoskeletal: Positive for arthralgias. Negative for joint swelling and myalgias.   Skin: Negative for rash and wound.   Allergic/Immunologic: Negative for environmental allergies.   Neurological: Negative for dizziness, syncope and numbness.   Hematological: Does not bruise/bleed easily.   Psychiatric/Behavioral: Negative for dysphoric mood and sleep disturbance. The patient is not nervous/anxious.          Past Medical History:   Diagnosis Date    • Arthritis    • Chest pain     states has been told d/t anxiety   • Coronary artery disease 09/2017    s/p stents, Dr Napier lov 1/2019   • Frequent urination at night    • Hepatitis C     Dr Melendrez treating   • History of bleeding ulcers 2012   • History of GI bleed 2012   • History of MI (myocardial infarction) 09/2017    posterior, Dr Napier follows   • History of palpitations     states has pain w/, cardiologist aware   • History of transfusion    • Hyperlipidemia    • Hypothyroidism    • Ischemic cardiomyopathy    • Left knee DJD     sched TKA   • Neuropathy of both feet    • NSVT (nonsustained ventricular tachycardia) (CMS/HCC)     h/o of after MI 2017   • Osteomyelitis of right foot (CMS/HCC)     h/o   • Sleep apnea     no machine     Past Surgical History:   Procedure Laterality Date   • CARDIAC CATHETERIZATION  09/2017   • CORONARY ANGIOPLASTY WITH STENT PLACEMENT  09/2017   • JOINT REPLACEMENT      RTK   • KNEE ACL RECONSTRUCTION Left 1992   • KNEE SURGERY Right     tendon rupture and repair/replace   • METATARSAL OSTEOTOMY Right 02/2019    2nd   • STOMACH SURGERY      d/t bleeding ulcer   • TOTAL KNEE ARTHROPLASTY Right 4/30/2019    Procedure: RIGHT TOTAL KNEE ARTHROPLASTY-[place], Orth align;  Surgeon: Bob Curry MD;  Location: [facility] OR;  Service: Orthopedics   • TOTAL KNEE ARTHROPLASTY Left 10/15/2019    Procedure: LEFT  TOTAL KNEE ARTHROPLASTY, one screw explanted from previous left knee surgery;  Surgeon: Bob Curry MD;  Location: [facility] OR;  Service: Orthopedics     Family History   Problem Relation Age of Onset   • Lung cancer Mother    • Alcohol abuse Father    • Seizures Father    • Diabetes Maternal Aunt    • Diabetes Maternal Uncle    • Alcohol abuse Paternal Aunt    • Alcohol abuse Paternal Uncle    • Heart disease Maternal Grandmother    • Alcohol abuse Paternal Grandmother    • Cirrhosis Paternal Grandmother    • Hypertension Paternal Grandfather    • Malig Hyperthermia Neg Hx           Objective:  There were no vitals filed for this visit.  There were no vitals filed for this visit.  There is no height or weight on file to calculate BMI.        Ortho Exam   AP lateral sunrise view of the knee does show the well-positioned and aligned press-fit total knee arthroplasty but the patella is on resurfaced.  On the sunrise views view the patella is in the trochlear groove of the implant.  No changes from prior x-rays.  On exam the knee shows no swelling effusion erythema and is 0 to 125 degrees of motion with no patellofemoral crepitus.  Minimal pain with patellar compression.  Quad and hamstring function are 5/5 the calf is nontender.  Good distal pulses no motor or sensory deficit.    Assessment:        1. Status post total right knee replacement    2. Status post total left knee replacement           Plan: Lengthy discussion with the patient regarding his knee pain.  He states it is much better than it was before surgery but he still has some discomfort although does not cause significant limitations.  I discussed with him the pain may be coming from the un-resurfaced patella.  After reviewing treatment options and received a cortisone injection a lidocaine Kenalog and retroportal 1 given through the superolateral portal tolerated well.  If he gets complete relief of his pain in the returns one option may be patella resurfacing procedure was see how he does.  Return to see me in 6 weeks.  Answered all questions  Large Joint Arthrocentesis: R knee  Date/Time: 9/9/2020 3:43 PM  Consent given by: patient  Site marked: site marked  Timeout: Immediately prior to procedure a time out was called to verify the correct patient, procedure, equipment, support staff and site/side marked as required   Supporting Documentation  Indications: pain   Procedure Details  Location: knee - R knee  Preparation: Patient was prepped and draped in the usual sterile fashion  Needle size: 22 G  Approach: superior  (LATERAL)  Medications administered: 4 mL lidocaine PF 1% 1 %; 40 mg triamcinolone acetonide 40 MG/ML  Patient tolerance: patient tolerated the procedure well with no immediate complications                  Work Status:    ORTIZ query complete.    Orders:  Orders Placed This Encounter   Procedures   • Large Joint Arthrocentesis: R knee   • XR Knee 3 View Right       Medications:  No orders of the defined types were placed in this encounter.      Followup:  Return if symptoms worsen or fail to improve.          Dictated utilizing Dragon dictation

## 2023-02-24 NOTE — TELEPHONE ENCOUNTER
Patient voiding with small amount of urine today around 100 average. PVR scan this evening 500- straight cath patient- got out 520ml  Yellow clear urine. Notified dr Chirag Mackenzie- on call for dr Iris Morgan- obtained order to bladder scan patient every 8h, if PVR above 350 straight cath.    RX Refill request.

## (undated) DEVICE — SPONGE,DRAIN,NONWVN,4"X4",6PLY,STRL,LF: Brand: MEDLINE

## (undated) DEVICE — DECANT BG O JET

## (undated) DEVICE — HOOD, PEEL-AWAY: Brand: FLYTE

## (undated) DEVICE — BNDG ELAS ELITE V/CLOSE 6IN 5YD LF STRL

## (undated) DEVICE — DISPOSABLE TOURNIQUET CUFF SINGLE BLADDER, SINGLE PORT AND LUER LOCK CONNECTOR: Brand: COLOR CUFF

## (undated) DEVICE — Device

## (undated) DEVICE — COAXIAL FEMORAL CANAL TIP

## (undated) DEVICE — DUAL CUT SAGITTAL BLADE

## (undated) DEVICE — TOWEL,OR,DSP,ST,BLUE,STD,4/PK,20PK/CS: Brand: MEDLINE

## (undated) DEVICE — PK KN TOTL 90

## (undated) DEVICE — FRAZIER SUCTION INSTRUMENT 10 FR W/CONTROL VENT & OBTURATOR: Brand: FRAZIER

## (undated) DEVICE — DISPOSABLE CEMENT SCULPS

## (undated) DEVICE — CONTAINER,SPECIMEN,OR STERILE,4OZ: Brand: MEDLINE

## (undated) DEVICE — KNEE HOLDER DISPOSABLE LINER: Brand: ALVARADO®  KNEE SUPPORT

## (undated) DEVICE — DEV NAV KN ALIGN

## (undated) DEVICE — SYS SKIN CLS DERMABOND PRINEO W/22CM MESH TP

## (undated) DEVICE — 450 ML BOTTLE OF 0.05% CHLORHEXIDINE GLUCONATE IN 99.95% STERILE WATER FOR IRRIGATION, USP AND APPLICATOR.: Brand: IRRISEPT ANTIMICROBIAL WOUND LAVAGE

## (undated) DEVICE — KT DRN EVAC WND PVC 7F3/32IN 400CC

## (undated) DEVICE — BNDG ELAS ELITE V/CLOSE 4IN 5YD LF STRL

## (undated) DEVICE — COLD THERAPY BLANKET: Brand: DEROYAL

## (undated) DEVICE — DRAPE,REIN 53X77,STERILE: Brand: MEDLINE

## (undated) DEVICE — SUT VIC TP1 SZ2 27IN J849G

## (undated) DEVICE — DRSNG TELFA PAD NONADH STR 1S 3X8IN

## (undated) DEVICE — GLV SURG EUDERMIC PF LTX 8 STRL

## (undated) DEVICE — GLV SURG NEOLON 2G PF LF 8 STRL

## (undated) DEVICE — APPL CHLORAPREP W/TINT 26ML ORNG

## (undated) DEVICE — IRRIGATOR BULB ASEPTO 60CC STRL

## (undated) DEVICE — PIN HOLD TEMP NOHEAD FLUT 1/8X3.5IN

## (undated) DEVICE — 2108 SERIES SAGITTAL BLADE AGGRESSIVE  (12.5 X 1.19 X 81.5MM)

## (undated) DEVICE — IMMOB KN 3PNL DLX CANVS 22IN BLU

## (undated) DEVICE — BOWL AND CEMENT CARTRIDGE WITH BREAKAWAY FEMORAL NOZZLE: Brand: ACM

## (undated) DEVICE — GLV SURG SENSICARE ORTHO PF LF 8 STRL

## (undated) DEVICE — DRSNG SURESITE WNDW 2.38X2.75

## (undated) DEVICE — DRSNG PAD ABD 8X10IN STRL

## (undated) DEVICE — MAT FLR ABSORBENT LG 4FT 10 2.5FT